# Patient Record
Sex: FEMALE | Race: WHITE | NOT HISPANIC OR LATINO | Employment: OTHER
[De-identification: names, ages, dates, MRNs, and addresses within clinical notes are randomized per-mention and may not be internally consistent; named-entity substitution may affect disease eponyms.]

---

## 2018-06-27 ENCOUNTER — HOSPITAL (OUTPATIENT)
Dept: OTHER | Age: 83
End: 2018-06-27
Attending: INTERNAL MEDICINE

## 2019-04-12 ENCOUNTER — HOSPITAL ENCOUNTER (OUTPATIENT)
Dept: CT IMAGING | Facility: HOSPITAL | Age: 84
Discharge: HOME | End: 2019-04-12
Attending: PHYSICIAN ASSISTANT
Payer: MEDICARE

## 2019-04-12 DIAGNOSIS — E78.5 HYPERLIPIDEMIA, UNSPECIFIED HYPERLIPIDEMIA TYPE: ICD-10-CM

## 2019-04-12 DIAGNOSIS — N18.2 CHRONIC KIDNEY DISEASE, STAGE II (MILD): ICD-10-CM

## 2019-04-12 DIAGNOSIS — R41.3 MEMORY DEFICIT: ICD-10-CM

## 2019-04-12 DIAGNOSIS — R32 URINARY INCONTINENCE: ICD-10-CM

## 2019-04-12 PROCEDURE — 70450 CT HEAD/BRAIN W/O DYE: CPT | Performed by: PHYSICIAN ASSISTANT

## 2019-05-02 ENCOUNTER — OFFICE VISIT (OUTPATIENT)
Dept: NEUROLOGY | Facility: CLINIC | Age: 84
End: 2019-05-02
Payer: MEDICARE

## 2019-05-02 VITALS
HEART RATE: 88 BPM | RESPIRATION RATE: 14 BRPM | BODY MASS INDEX: 32 KG/M2 | SYSTOLIC BLOOD PRESSURE: 116 MMHG | DIASTOLIC BLOOD PRESSURE: 72 MMHG | WEIGHT: 157 LBS

## 2019-05-02 DIAGNOSIS — R45.3 APATHY: ICD-10-CM

## 2019-05-02 DIAGNOSIS — R41.3 POOR SHORT TERM MEMORY: Primary | ICD-10-CM

## 2019-05-02 DIAGNOSIS — F41.9 ANXIETY: ICD-10-CM

## 2019-05-02 DIAGNOSIS — R25.1 TREMOR: ICD-10-CM

## 2019-05-02 DIAGNOSIS — F51.01 PRIMARY INSOMNIA: ICD-10-CM

## 2019-05-02 PROCEDURE — 99205 OFFICE O/P NEW HI 60 MIN: CPT | Performed by: OTHER

## 2019-05-02 RX ORDER — RANITIDINE 150 MG/1
150 TABLET ORAL NIGHTLY
COMMUNITY
Start: 2017-11-14 | End: 2020-01-01

## 2019-05-02 RX ORDER — AMLODIPINE BESYLATE 2.5 MG/1
2.5 TABLET ORAL DAILY
Refills: 1 | Status: ON HOLD | COMMUNITY
Start: 2019-04-05 | End: 2020-01-01

## 2019-05-02 NOTE — PATIENT INSTRUCTIONS
Refill policies:    • Allow 2-3 business days for refills; controlled substances may take longer.   • Contact your pharmacy at least 5 days prior to running out of medication and have them send an electronic request or submit request through the “request re been approved by your insurer. Depending on your insurance carrier, approval may take 3-10 days. It is highly recommended patients contact their insurance carrier directly to determine coverage.   If test is done without insurance authorization, patient ma for the EEG and 2 on your chest for an EKG tracing. Head will be measured using a washable grease pencil. Head will be prepped with a mild abrasive for good conductivity. Electrodes are applied with paste and gauze.   Paste is water soluble so most of it

## 2019-05-02 NOTE — PROGRESS NOTES
[facility]   Neurology; INITIAL CLINIC VISIT  2019, 11:09 AM     Hawa Coombs Patient Status:  No patient class for patient encounter    10/3/1935 MRN [id number]   Location [address] [place] [address] that she does not use drugs.     ALLERGIES:    Pcns [Penicillins]      Runny nose  Sulfa Antibiotics       NAUSEA AND VOMITING  Celecoxib               NAUSEA ONLY    MEDICATIONS:    Current Outpatient Medications:  amLODIPine Besylate 2.5 MG Oral Tab Take palpitations   GI: denies nausea, vomiting, constipation, diarrhea; no rectal bleeding; no heartburn  GENITAL/: no dysuria, urgency or frequency;  no hernias; no nocturia  GYNE/: no dysuria, urgency or frequency; no urinary incontinence  MUSCULOSKELETA Normal hearing       CN IX, XI:  Normal Gag, uvula palate midline       CN XII:  SCM strong, equal shoulder shrug  Motor:  No drift, rapid finger movement symmetric. 5/5 in all limbs with normal tone. No tremor of any kind;   Sensory;   Intact to light touc has rapid declined mental condition and poor concentration, as well as anxiety and hand shakes, I suspected that she has mild AD superimposed with toxic metabolic encephalopathy , tramadol is known to have TME and silent seizure side effect,   Stop tramado

## 2019-05-02 NOTE — PROGRESS NOTES
Family states pt has been independent up until recently. Around January family states pt began to decline cognitively and showing signs of anxiety and disassociation.  Family states pt stopped taking meadications, family member states they have been on top

## 2019-05-08 ENCOUNTER — TELEPHONE (OUTPATIENT)
Dept: NEUROLOGY | Facility: CLINIC | Age: 84
End: 2019-05-08

## 2019-05-08 NOTE — TELEPHONE ENCOUNTER
[facility] calling calling to see if there is a new diagnosis and a summary of the visit

## 2019-05-09 NOTE — TELEPHONE ENCOUNTER
Call transferred from [facility] staff. Xuan Cadena RN case manager from [facility] wanting to discuss summary/plan of care from recent visit. Questions answered.

## 2019-05-20 ENCOUNTER — HOSPITAL ENCOUNTER (OUTPATIENT)
Dept: MRI IMAGING | Facility: HOSPITAL | Age: 84
Discharge: HOME | End: 2019-05-20
Attending: Other
Payer: MEDICARE

## 2019-05-20 ENCOUNTER — PROCEDURE VISIT (OUTPATIENT)
Dept: NEUROLOGY | Facility: CLINIC | Age: 84
End: 2019-05-20

## 2019-05-20 ENCOUNTER — NURSE ONLY (OUTPATIENT)
Dept: ELECTROPHYSIOLOGY | Facility: HOSPITAL | Age: 84
End: 2019-05-20
Attending: Other
Payer: MEDICARE

## 2019-05-20 ENCOUNTER — LAB ENCOUNTER (OUTPATIENT)
Dept: LAB | Age: 84
End: 2019-05-20
Attending: Other
Payer: MEDICARE

## 2019-05-20 DIAGNOSIS — R41.3 POOR SHORT TERM MEMORY: ICD-10-CM

## 2019-05-20 DIAGNOSIS — R41.3 POOR SHORT TERM MEMORY: Primary | ICD-10-CM

## 2019-05-20 PROCEDURE — 84443 ASSAY THYROID STIM HORMONE: CPT

## 2019-05-20 PROCEDURE — 95816 EEG AWAKE AND DROWSY: CPT | Performed by: OTHER

## 2019-05-20 PROCEDURE — 70553 MRI BRAIN STEM W/O & W/DYE: CPT | Performed by: OTHER

## 2019-05-20 PROCEDURE — 86038 ANTINUCLEAR ANTIBODIES: CPT

## 2019-05-20 PROCEDURE — 36415 COLL VENOUS BLD VENIPUNCTURE: CPT

## 2019-05-20 PROCEDURE — 86431 RHEUMATOID FACTOR QUANT: CPT

## 2019-05-20 PROCEDURE — 85025 COMPLETE CBC W/AUTO DIFF WBC: CPT

## 2019-05-20 PROCEDURE — A9575 INJ GADOTERATE MEGLUMI 0.1ML: HCPCS | Performed by: OTHER

## 2019-05-20 PROCEDURE — 86140 C-REACTIVE PROTEIN: CPT

## 2019-05-20 PROCEDURE — 85652 RBC SED RATE AUTOMATED: CPT

## 2019-05-20 PROCEDURE — 82746 ASSAY OF FOLIC ACID SERUM: CPT

## 2019-05-20 PROCEDURE — 80053 COMPREHEN METABOLIC PANEL: CPT

## 2019-05-20 PROCEDURE — 82607 VITAMIN B-12: CPT

## 2019-05-21 NOTE — PROCEDURES
ELECTROENCEPHALOGRAM REPORT      Patient Name: Rodriguez Vance  Chart ID: [id number]  Ordering Physician: Ramila Medrano MD Date of Test: 5/20/2019  Referring Physician:   Patient Diagnosis: Memory loss    HISTORY  PT IS A 84YO FEMALE WHO PRESENTS WITH MA

## 2019-05-23 ENCOUNTER — TELEPHONE (OUTPATIENT)
Dept: NEUROLOGY | Facility: CLINIC | Age: 84
End: 2019-05-23

## 2019-05-23 NOTE — TELEPHONE ENCOUNTER
Spoke with patient's son when calling him for EEG results and he requested MRI results. Routed to covering provider.

## 2019-05-23 NOTE — TELEPHONE ENCOUNTER
MD DOUGLAS Larkin Nurse             Negative EEG but was technically limited. Patient called on approved number and son answered the phone. (ok to speak with son per HIPPA). Son informed of the above information.

## 2019-05-23 NOTE — TELEPHONE ENCOUNTER
MRI brain negative for acute pathology. Shows old chronic strokes. Dr Harinder Valadez can discussed with them on follow up.

## 2019-05-23 NOTE — TELEPHONE ENCOUNTER
Called patient's son and informed him of the following information from Dr. Rodger Purvis;    Germán Turner MD     5/23/19 1:33 PM   Note      MRI brain negative for acute pathology. Shows old chronic strokes.  Dr Dian Quezada can discussed with them on follow

## 2019-05-24 ENCOUNTER — TELEPHONE (OUTPATIENT)
Dept: NEUROLOGY | Facility: CLINIC | Age: 84
End: 2019-05-24

## 2019-05-24 DIAGNOSIS — R41.3 POOR SHORT TERM MEMORY: Primary | ICD-10-CM

## 2019-05-24 NOTE — TELEPHONE ENCOUNTER
Left message for pt to call office for test results and to relay instructions.    ----- Message from Mona Shoemaker MD sent at 5/23/2019  8:25 PM CDT -----  Folic acid is low, take one folic acid 1 mg daily, along with one multiple vitamin

## 2019-05-29 RX ORDER — FOLIC ACID 1 MG/1
1 TABLET ORAL DAILY
Qty: 30 TABLET | Refills: 2 | Status: SHIPPED | OUTPATIENT
Start: 2019-05-29 | End: 2019-06-14

## 2019-05-29 NOTE — TELEPHONE ENCOUNTER
Spoke with pt's son (OK per HIPPA) relayed folic acid results, gave instructions concerning folic acid supplement and daily multivitamin. Understanding was expressed, pt and family member encouraged to call office with any further concerns.  Order for 1mg F

## 2019-06-04 ENCOUNTER — TELEPHONE (OUTPATIENT)
Dept: NEUROLOGY | Facility: CLINIC | Age: 84
End: 2019-06-04

## 2019-06-04 NOTE — TELEPHONE ENCOUNTER
Per Epic review, patient has been informed of the below results.  See TE on 5/23/2019.    ----- Message from Carmita Fowler MD sent at 6/3/2019  2:02 PM CDT -----  MRI showed no acute change, will review film at visit

## 2019-06-10 ENCOUNTER — LAB REQUISITION (OUTPATIENT)
Dept: LAB | Facility: HOSPITAL | Age: 84
End: 2019-06-10
Payer: MEDICARE

## 2019-06-10 DIAGNOSIS — Z87.440 PERSONAL HISTORY OF URINARY INFECTION: ICD-10-CM

## 2019-06-10 DIAGNOSIS — N39.0 URINARY TRACT INFECTION: ICD-10-CM

## 2019-06-10 PROCEDURE — 81001 URINALYSIS AUTO W/SCOPE: CPT | Performed by: FAMILY MEDICINE

## 2019-06-10 PROCEDURE — 87086 URINE CULTURE/COLONY COUNT: CPT | Performed by: FAMILY MEDICINE

## 2019-06-11 ENCOUNTER — TELEPHONE (OUTPATIENT)
Dept: NEUROLOGY | Facility: CLINIC | Age: 84
End: 2019-06-11

## 2019-06-11 NOTE — TELEPHONE ENCOUNTER
Spoke with patient's son Celina Harper (ok per HIPAA consent) who states patient's primary care physician is [facility]. Several attempts made to fax results to [facility] with no confirmation received. Will continue trying.

## 2019-06-14 ENCOUNTER — OFFICE VISIT (OUTPATIENT)
Dept: NEUROLOGY | Facility: CLINIC | Age: 84
End: 2019-06-14
Payer: MEDICARE

## 2019-06-14 VITALS
DIASTOLIC BLOOD PRESSURE: 70 MMHG | WEIGHT: 157 LBS | BODY MASS INDEX: 32 KG/M2 | HEART RATE: 66 BPM | RESPIRATION RATE: 17 BRPM | SYSTOLIC BLOOD PRESSURE: 130 MMHG

## 2019-06-14 DIAGNOSIS — F41.9 ANXIETY: ICD-10-CM

## 2019-06-14 DIAGNOSIS — R25.1 TREMOR: Primary | ICD-10-CM

## 2019-06-14 DIAGNOSIS — G30.1 LATE ONSET ALZHEIMER'S DISEASE WITH BEHAVIORAL DISTURBANCE (HCC): ICD-10-CM

## 2019-06-14 DIAGNOSIS — F51.01 PRIMARY INSOMNIA: ICD-10-CM

## 2019-06-14 DIAGNOSIS — R41.3 POOR SHORT TERM MEMORY: ICD-10-CM

## 2019-06-14 DIAGNOSIS — R45.3 APATHY: ICD-10-CM

## 2019-06-14 DIAGNOSIS — F02.81 LATE ONSET ALZHEIMER'S DISEASE WITH BEHAVIORAL DISTURBANCE (HCC): ICD-10-CM

## 2019-06-14 PROBLEM — I10 BENIGN ESSENTIAL HYPERTENSION: Status: ACTIVE | Noted: 2019-06-14

## 2019-06-14 PROBLEM — M10.9 GOUT: Status: ACTIVE | Noted: 2019-06-14

## 2019-06-14 PROBLEM — N17.9 ACUTE RENAL FAILURE (HCC): Status: ACTIVE | Noted: 2019-06-14

## 2019-06-14 PROBLEM — I12.9 HYPERTENSIVE CHRONIC KIDNEY DISEASE WITH STAGE 1 THROUGH STAGE 4 CHRONIC KIDNEY DISEASE, OR UNSPECIFIED CHRONIC KIDNEY DISEASE: Status: ACTIVE | Noted: 2019-06-14

## 2019-06-14 PROBLEM — H25.13 AGE-RELATED NUCLEAR CATARACT OF BOTH EYES: Status: ACTIVE | Noted: 2017-12-05

## 2019-06-14 PROBLEM — Z96.1 PSEUDOPHAKIA: Status: ACTIVE | Noted: 2017-12-09

## 2019-06-14 PROBLEM — N39.0 URINARY TRACT INFECTION: Status: ACTIVE | Noted: 2019-06-14

## 2019-06-14 PROBLEM — R60.9 EDEMA: Status: ACTIVE | Noted: 2019-06-14

## 2019-06-14 PROBLEM — E55.9 VITAMIN D DEFICIENCY, UNSPECIFIED: Status: ACTIVE | Noted: 2019-06-14

## 2019-06-14 PROCEDURE — 99214 OFFICE O/P EST MOD 30 MIN: CPT | Performed by: OTHER

## 2019-06-14 RX ORDER — OMEGA-3 FATTY ACIDS/FISH OIL 300-1000MG
CAPSULE ORAL
COMMUNITY
End: 2019-08-02

## 2019-06-14 RX ORDER — NYSTATIN 100000 [USP'U]/G
POWDER TOPICAL
Refills: 1 | COMMUNITY
Start: 2019-06-11 | End: 2019-08-02

## 2019-06-14 RX ORDER — MEMANTINE HYDROCHLORIDE 5 MG/1
5 TABLET ORAL 2 TIMES DAILY
Qty: 60 TABLET | Refills: 3 | Status: SHIPPED | OUTPATIENT
Start: 2019-06-14

## 2019-06-14 RX ORDER — FOLIC ACID 1 MG/1
1 TABLET ORAL DAILY
Qty: 30 TABLET | Refills: 11 | Status: SHIPPED | OUTPATIENT
Start: 2019-06-14

## 2019-06-14 NOTE — PROGRESS NOTES
[facility]   Neurology; follow up  CLINIC VISIT  2019    Ismael Rock Patient Status:  No patient class for patient encounter    10/3/1935 MRN [id number]   Location [address] [place] [address], [address] [place] [address], [address] [place] [address] HISTORY:  Past Surgical History:   Procedure Laterality Date   • APPENDECTOMY     • CHOLECYSTECTOMY         FAMILY HISTORY:  family history is not on file. SOCIAL HISTORY:   reports that she has never smoked.  She has never used smokeless tobacco. She re no visual complaints or deficits  HEENT: denies nasal congestion, sinus pain or sore throat; no  hearing loss;  RESPIRATORY: denies shortness of breath, wheezing or cough   CARDIOVASCULAR: denies chest pain, no palpitations   GI: denies nausea, vomiting, c movements normal.  Normal pursuit and saccades.                             No nystagmus, no ptosis       CN V: Masseters strong, Facial sensations normal,        CN  VII:  Symmetric facial movement       CN VIII:  Normal hearing       CN IX, XI:  Normal [place]      Problem List Items Addressed This Visit     Anxiety    Apathy    Late onset Alzheimer's disease with behavioral disturbance    Relevant Medications    Memantine HCl (NAMENDA) 5 MG Oral Tab    Poor short term memory    Relevant Medications

## 2019-06-17 ENCOUNTER — TELEPHONE (OUTPATIENT)
Dept: NEUROLOGY | Facility: CLINIC | Age: 84
End: 2019-06-17

## 2019-06-19 ENCOUNTER — TELEPHONE (OUTPATIENT)
Dept: NEUROLOGY | Facility: CLINIC | Age: 84
End: 2019-06-19

## 2019-06-19 NOTE — TELEPHONE ENCOUNTER
Spoke with Purnima Ham [facility] and confirmed Dx of Alzheimer's and Dx codes at [facility] [place]. Michelle Valdez also said patient's son tried to  her Namenda but pharmacy did not have the Rx. Pharmacy called and has Rx. Pharmacist will notify patient ready for .

## 2019-08-02 ENCOUNTER — APPOINTMENT (OUTPATIENT)
Dept: GENERAL RADIOLOGY | Facility: HOSPITAL | Age: 84
DRG: 071 | End: 2019-08-02
Attending: EMERGENCY MEDICINE
Payer: MEDICARE

## 2019-08-02 ENCOUNTER — HOSPITAL ENCOUNTER (INPATIENT)
Facility: HOSPITAL | Age: 84
LOS: 2 days | Discharge: NURSING, REHAB OR LONG-TERM CARE | DRG: 071 | End: 2019-08-06
Attending: EMERGENCY MEDICINE | Admitting: INTERNAL MEDICINE
Payer: MEDICARE

## 2019-08-02 ENCOUNTER — APPOINTMENT (OUTPATIENT)
Dept: CT IMAGING | Facility: HOSPITAL | Age: 84
DRG: 071 | End: 2019-08-02
Attending: EMERGENCY MEDICINE
Payer: MEDICARE

## 2019-08-02 DIAGNOSIS — E86.0 DEHYDRATION: ICD-10-CM

## 2019-08-02 DIAGNOSIS — R41.82 ALTERED MENTAL STATUS, UNSPECIFIED ALTERED MENTAL STATUS TYPE: Primary | ICD-10-CM

## 2019-08-02 PROBLEM — D64.9 ANEMIA: Status: ACTIVE | Noted: 2019-08-02

## 2019-08-02 PROBLEM — D69.6 THROMBOCYTOPENIA (HCC): Status: ACTIVE | Noted: 2019-08-02

## 2019-08-02 PROBLEM — R79.89 AZOTEMIA: Status: ACTIVE | Noted: 2019-08-02

## 2019-08-02 LAB
ALBUMIN SERPL-MCNC: 3 G/DL (ref 3.4–5)
ALBUMIN/GLOB SERPL: 0.8 {RATIO} (ref 1–2)
ALP LIVER SERPL-CCNC: 104 U/L (ref 55–142)
ALT SERPL-CCNC: 12 U/L (ref 13–56)
ANION GAP SERPL CALC-SCNC: 5 MMOL/L (ref 0–18)
AST SERPL-CCNC: 7 U/L (ref 15–37)
ATRIAL RATE: 57 BPM
BASOPHILS # BLD AUTO: 0.01 X10(3) UL (ref 0–0.2)
BASOPHILS NFR BLD AUTO: 0.2 %
BILIRUB SERPL-MCNC: 0.4 MG/DL (ref 0.1–2)
BILIRUB UR QL STRIP.AUTO: NEGATIVE
BUN BLD-MCNC: 36 MG/DL (ref 7–18)
BUN/CREAT SERPL: 22.8 (ref 10–20)
CALCIUM BLD-MCNC: 9.3 MG/DL (ref 8.5–10.1)
CHLORIDE SERPL-SCNC: 113 MMOL/L (ref 98–112)
CLARITY UR REFRACT.AUTO: CLEAR
CO2 SERPL-SCNC: 25 MMOL/L (ref 21–32)
COLOR UR AUTO: YELLOW
CREAT BLD-MCNC: 1.58 MG/DL (ref 0.55–1.02)
DEPRECATED RDW RBC AUTO: 51.8 FL (ref 35.1–46.3)
EOSINOPHIL # BLD AUTO: 0.14 X10(3) UL (ref 0–0.7)
EOSINOPHIL NFR BLD AUTO: 2.4 %
ERYTHROCYTE [DISTWIDTH] IN BLOOD BY AUTOMATED COUNT: 15.7 % (ref 11–15)
GLOBULIN PLAS-MCNC: 3.7 G/DL (ref 2.8–4.4)
GLUCOSE BLD-MCNC: 94 MG/DL (ref 70–99)
GLUCOSE UR STRIP.AUTO-MCNC: NEGATIVE MG/DL
HCT VFR BLD AUTO: 36.2 % (ref 35–48)
HGB BLD-MCNC: 11.3 G/DL (ref 12–16)
IMM GRANULOCYTES # BLD AUTO: 0.02 X10(3) UL (ref 0–1)
IMM GRANULOCYTES NFR BLD: 0.3 %
KETONES UR STRIP.AUTO-MCNC: NEGATIVE MG/DL
LEUKOCYTE ESTERASE UR QL STRIP.AUTO: NEGATIVE
LYMPHOCYTES # BLD AUTO: 1.69 X10(3) UL (ref 1–4)
LYMPHOCYTES NFR BLD AUTO: 28.9 %
M PROTEIN MFR SERPL ELPH: 6.7 G/DL (ref 6.4–8.2)
MCH RBC QN AUTO: 28 PG (ref 26–34)
MCHC RBC AUTO-ENTMCNC: 31.2 G/DL (ref 31–37)
MCV RBC AUTO: 89.6 FL (ref 80–100)
MONOCYTES # BLD AUTO: 0.49 X10(3) UL (ref 0.1–1)
MONOCYTES NFR BLD AUTO: 8.4 %
NEUTROPHILS # BLD AUTO: 3.5 X10 (3) UL (ref 1.5–7.7)
NEUTROPHILS # BLD AUTO: 3.5 X10(3) UL (ref 1.5–7.7)
NEUTROPHILS NFR BLD AUTO: 59.8 %
NITRITE UR QL STRIP.AUTO: NEGATIVE
OSMOLALITY SERPL CALC.SUM OF ELEC: 304 MOSM/KG (ref 275–295)
P AXIS: 17 DEGREES
P-R INTERVAL: 164 MS
PH UR STRIP.AUTO: 6 [PH] (ref 4.5–8)
PLATELET # BLD AUTO: 137 10(3)UL (ref 150–450)
POTASSIUM SERPL-SCNC: 4.5 MMOL/L (ref 3.5–5.1)
PROT UR STRIP.AUTO-MCNC: NEGATIVE MG/DL
Q-T INTERVAL: 458 MS
QRS DURATION: 148 MS
QTC CALCULATION (BEZET): 445 MS
R AXIS: -50 DEGREES
RBC # BLD AUTO: 4.4 X10(6)UL (ref 3.8–5.3)
RBC UR QL AUTO: NEGATIVE
SODIUM SERPL-SCNC: 143 MMOL/L (ref 136–145)
SP GR UR STRIP.AUTO: 1.01 (ref 1–1.03)
T AXIS: 8 DEGREES
TROPONIN I SERPL-MCNC: <0.045 NG/ML (ref ?–0.04)
UROBILINOGEN UR STRIP.AUTO-MCNC: <2 MG/DL
VENTRICULAR RATE: 57 BPM
WBC # BLD AUTO: 5.9 X10(3) UL (ref 4–11)

## 2019-08-02 PROCEDURE — 72125 CT NECK SPINE W/O DYE: CPT | Performed by: EMERGENCY MEDICINE

## 2019-08-02 PROCEDURE — 70450 CT HEAD/BRAIN W/O DYE: CPT | Performed by: EMERGENCY MEDICINE

## 2019-08-02 PROCEDURE — 71045 X-RAY EXAM CHEST 1 VIEW: CPT | Performed by: EMERGENCY MEDICINE

## 2019-08-02 RX ORDER — ASPIRIN 81 MG/1
81 TABLET, CHEWABLE ORAL DAILY
COMMUNITY
End: 2020-01-01 | Stop reason: CLARIF

## 2019-08-02 RX ORDER — ACETAMINOPHEN 500 MG
500 TABLET ORAL NIGHTLY
COMMUNITY

## 2019-08-02 RX ORDER — SODIUM CHLORIDE 9 MG/ML
125 INJECTION, SOLUTION INTRAVENOUS CONTINUOUS
Status: DISCONTINUED | OUTPATIENT
Start: 2019-08-02 | End: 2019-08-04

## 2019-08-02 RX ORDER — ONDANSETRON 2 MG/ML
4 INJECTION INTRAMUSCULAR; INTRAVENOUS EVERY 4 HOURS PRN
Status: DISCONTINUED | OUTPATIENT
Start: 2019-08-02 | End: 2019-08-06

## 2019-08-02 RX ORDER — SODIUM CHLORIDE 9 MG/ML
INJECTION, SOLUTION INTRAVENOUS CONTINUOUS
Status: DISCONTINUED | OUTPATIENT
Start: 2019-08-02 | End: 2019-08-06

## 2019-08-02 RX ORDER — SODIUM CHLORIDE 9 MG/ML
INJECTION, SOLUTION INTRAVENOUS CONTINUOUS
Status: ACTIVE | OUTPATIENT
Start: 2019-08-02 | End: 2019-08-02

## 2019-08-02 RX ORDER — HEPARIN SODIUM 5000 [USP'U]/ML
5000 INJECTION, SOLUTION INTRAVENOUS; SUBCUTANEOUS EVERY 12 HOURS SCHEDULED
Status: DISCONTINUED | OUTPATIENT
Start: 2019-08-02 | End: 2019-08-06

## 2019-08-02 RX ORDER — FOAM BANDAGE 4" X 4"
BANDAGE TOPICAL
Status: ON HOLD | COMMUNITY
End: 2020-01-01

## 2019-08-02 NOTE — ED NOTES
Spoke with patient's son Zbigniew bauman notify of patient status and plan for admission. Informed of recent dementia diagnosis by Dr. Eduardo Rossi as well as placement in longterm care facility.

## 2019-08-02 NOTE — ED NOTES
Patient ready for admission, observed sleeping intermittently. Responds to her name with light touch, still very drowsy and aox1 when awake. VSS. No distress observed.

## 2019-08-02 NOTE — ED NOTES
Patient becoming more alert, opening her eyes and trying to answer questions. Remains somewhat restless. VS remain stable. Patient has returned from [address] Xray has been at bedside.

## 2019-08-02 NOTE — ED PROVIDER NOTES
Patient Seen in: [facility] Emergency Department    History   Patient presents with:  Altered Mental Status (neurologic)    Stated Complaint: AMS    HPI    This is a 28-year-old female who arrives from the nursing home.   According to the paramedics 57   Resp 08/02/19 0921 14   Temp 08/02/19 0921 98.2 °F (36.8 °C)   Temp src 08/02/19 0921 Temporal   SpO2 08/02/19 0921 99 %   O2 Device 08/02/19 0915 None (Room air)       Current:BP (!) 161/73   Pulse 51   Temp 98.2 °F (36.8 °C) (Temporal)   Resp 14   H HGB 11.3 (*)     .0 (*)     RDW 15.7 (*)     RDW-SD 51.8 (*)     All other components within normal limits   TROPONIN I - Normal   CBC WITH DIFFERENTIAL WITH PLATELET    Narrative:      The following orders were created for panel order CBC WITH DI prominence of the ventricles. There are patchy areas of low attenuation in the periventricular and deep white matter which are nonspecific but most consistent with small vessel ischemic changes.  There is no evidence of hemorrhage, mass, midline shift, or e Johana Richardson the patient had an episode where she was on altered mental status, unresponsive while she was at the dining room table there is no acute pathology at this present time possibly include arrhythmias, syncope, altered mental status.   At this present anibal

## 2019-08-02 NOTE — ED NOTES
Patient continues to become more alert and trying to answer questions. Spoke with Jones DAWKINS at [facility]. Informed that patient is normal aox2-3 with recent diagnosis of dementia, usually walks with walker with assistance.  Typically a quiet person

## 2019-08-02 NOTE — ED INITIAL ASSESSMENT (HPI)
Patient arrives by ems from [facility] following call for \"unconscious person at dining table\". Ems states they found patient lying in bed, unable to get any further report on patient other than a few minutes of being unresponsive this morning.

## 2019-08-02 NOTE — PLAN OF CARE
Patient admitted to [facility]  Admission Navigator completed  Dr Svetlana Hester paged and admission orders obtained  Patient is resting comfortably   Will continue to monitor

## 2019-08-03 LAB
CORTIS SERPL-MCNC: 14.2 UG/DL
T4 FREE SERPL-MCNC: 0.9 NG/DL (ref 0.8–1.7)
TSI SER-ACNC: 1.52 MIU/ML (ref 0.36–3.74)
VIT B12 SERPL-MCNC: 917 PG/ML (ref 193–986)

## 2019-08-03 PROCEDURE — 99223 1ST HOSP IP/OBS HIGH 75: CPT | Performed by: OTHER

## 2019-08-03 RX ORDER — METHENAMINE HIPPURATE 1000 MG/1
1 TABLET ORAL 2 TIMES DAILY
Status: DISCONTINUED | OUTPATIENT
Start: 2019-08-03 | End: 2019-08-06

## 2019-08-03 RX ORDER — ACETAMINOPHEN 500 MG
500 TABLET ORAL EVERY 8 HOURS PRN
Status: DISCONTINUED | OUTPATIENT
Start: 2019-08-03 | End: 2019-08-06

## 2019-08-03 RX ORDER — FOLIC ACID 1 MG/1
1 TABLET ORAL DAILY
Status: DISCONTINUED | OUTPATIENT
Start: 2019-08-03 | End: 2019-08-06

## 2019-08-03 RX ORDER — ATORVASTATIN CALCIUM 10 MG/1
10 TABLET, FILM COATED ORAL NIGHTLY
Status: DISCONTINUED | OUTPATIENT
Start: 2019-08-03 | End: 2019-08-06

## 2019-08-03 RX ORDER — CALCIUM CARBONATE/VITAMIN D3 250-3.125
1 TABLET ORAL DAILY
Status: DISCONTINUED | OUTPATIENT
Start: 2019-08-03 | End: 2019-08-06

## 2019-08-03 RX ORDER — MEMANTINE HYDROCHLORIDE 10 MG/1
5 TABLET ORAL 2 TIMES DAILY
Status: DISCONTINUED | OUTPATIENT
Start: 2019-08-03 | End: 2019-08-06

## 2019-08-03 RX ORDER — ASPIRIN 81 MG/1
81 TABLET, CHEWABLE ORAL DAILY
Status: DISCONTINUED | OUTPATIENT
Start: 2019-08-03 | End: 2019-08-06

## 2019-08-03 RX ORDER — AMLODIPINE BESYLATE 2.5 MG/1
2.5 TABLET ORAL DAILY
Status: DISCONTINUED | OUTPATIENT
Start: 2019-08-03 | End: 2019-08-06

## 2019-08-03 RX ORDER — ACETAMINOPHEN 325 MG/1
650 TABLET ORAL EVERY 6 HOURS PRN
Status: DISCONTINUED | OUTPATIENT
Start: 2019-08-03 | End: 2019-08-06

## 2019-08-03 RX ORDER — FAMOTIDINE 20 MG/1
20 TABLET ORAL DAILY
Status: DISCONTINUED | OUTPATIENT
Start: 2019-08-03 | End: 2019-08-06

## 2019-08-03 RX ORDER — ACETAMINOPHEN 500 MG
500 TABLET ORAL NIGHTLY
Status: DISCONTINUED | OUTPATIENT
Start: 2019-08-03 | End: 2019-08-06

## 2019-08-03 NOTE — PLAN OF CARE
Pt alert to self. Denies pain. VSS. Afebrile. IVF. Mepilex, medihoney to R heel. Bunny boots in place. Neuro consulted. PT/OT eval.  Plan for EEG, MRI, MRA. Will monitor closely.       Problem: NEUROLOGICAL - ADULT  Goal: Achieves stable or improved assistive devices as appropriate  - Consider OT/PT consult to assist with strengthening/mobility  - Encourage toileting schedule  Outcome: Progressing

## 2019-08-03 NOTE — CONSULTS
[facility]   Neurology; INITIAL Consultation VISIT  8/3/2019, 11:01 AM     Rodriguez Vance Patient Status:  Observation    10/3/1935 MRN [id number]   [facility] 3NE-A Attending MD ARACELIS Ardon Runny nose  Sulfa Antibiotics       NAUSEA AND VOMITING  Trimethoprim            UNKNOWN  Celecoxib               NAUSEA ONLY    MEDICATIONS:    No current facility-administered medications on file prior to encounter.    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 Need to rule out acute event. Differential diagnosis would include stroke or seizures. Continue searching for any toxic metabolic factors.   I will go ahead an

## 2019-08-03 NOTE — PLAN OF CARE
Assumed patient care at 1900  Patient A&O to self  No complaints of pain or discomfort  CPOT scale used  VSS  Tele-SR/SB  Patient mainly non-verbal overnight  Heparin Sub Q  No new orders from MD  (L) Heel ulcer dressed. Dressing C/D/I.  Bunny boots in plac integrity remains intact  Description  INTERVENTIONS  - Assess and document risk factors for pressure ulcer development  - Assess and document skin integrity  - Monitor for areas of redness and/or skin breakdown  - Initiate interventions, skin care algorit

## 2019-08-03 NOTE — H&P
[facility]    PATIENT'S NAME: Ines Esquivel   ATTENDING PHYSICIAN: Sara Vasquez M.D.    PATIENT ACCOUNT#:   [de-identified]    LOCATION:  [address] [place] [address]  MEDICAL RECORD #:   [id number]       YOB: 1935  ADMISSION DATE:       08/02/2019 EXAMINATION:    GENERAL:  Fair, awake, alert to name. HEENT:  Head:  Atraumatic. Eyes:  EOMI. NECK:  Supple. No JVD. LUNGS:  Clear to auscultate. No rhonchi, wheezes. HEART:  Regular rate and rhythm. No S3.  ABDOMEN:  Bowel sounds present.   Soft

## 2019-08-04 ENCOUNTER — APPOINTMENT (OUTPATIENT)
Dept: MRI IMAGING | Facility: HOSPITAL | Age: 84
DRG: 071 | End: 2019-08-04
Attending: Other
Payer: MEDICARE

## 2019-08-04 LAB
ALBUMIN SERPL-MCNC: 2.6 G/DL (ref 3.4–5)
ALBUMIN/GLOB SERPL: 0.8 {RATIO} (ref 1–2)
ALP LIVER SERPL-CCNC: 96 U/L (ref 55–142)
ALT SERPL-CCNC: 12 U/L (ref 13–56)
ANION GAP SERPL CALC-SCNC: 4 MMOL/L (ref 0–18)
AST SERPL-CCNC: 10 U/L (ref 15–37)
BASOPHILS # BLD AUTO: 0.01 X10(3) UL (ref 0–0.2)
BASOPHILS NFR BLD AUTO: 0.2 %
BILIRUB SERPL-MCNC: 0.4 MG/DL (ref 0.1–2)
BUN BLD-MCNC: 22 MG/DL (ref 7–18)
BUN/CREAT SERPL: 16.8 (ref 10–20)
CALCIUM BLD-MCNC: 8.4 MG/DL (ref 8.5–10.1)
CHLORIDE SERPL-SCNC: 116 MMOL/L (ref 98–112)
CO2 SERPL-SCNC: 23 MMOL/L (ref 21–32)
CREAT BLD-MCNC: 1.31 MG/DL (ref 0.55–1.02)
DEPRECATED RDW RBC AUTO: 50.4 FL (ref 35.1–46.3)
EOSINOPHIL # BLD AUTO: 0.09 X10(3) UL (ref 0–0.7)
EOSINOPHIL NFR BLD AUTO: 2 %
ERYTHROCYTE [DISTWIDTH] IN BLOOD BY AUTOMATED COUNT: 15.3 % (ref 11–15)
GLOBULIN PLAS-MCNC: 3.3 G/DL (ref 2.8–4.4)
GLUCOSE BLD-MCNC: 83 MG/DL (ref 70–99)
HCT VFR BLD AUTO: 34.1 % (ref 35–48)
HGB BLD-MCNC: 10.5 G/DL (ref 12–16)
IMM GRANULOCYTES # BLD AUTO: 0.01 X10(3) UL (ref 0–1)
IMM GRANULOCYTES NFR BLD: 0.2 %
LYMPHOCYTES # BLD AUTO: 1.25 X10(3) UL (ref 1–4)
LYMPHOCYTES NFR BLD AUTO: 28.2 %
M PROTEIN MFR SERPL ELPH: 5.9 G/DL (ref 6.4–8.2)
MCH RBC QN AUTO: 28 PG (ref 26–34)
MCHC RBC AUTO-ENTMCNC: 30.8 G/DL (ref 31–37)
MCV RBC AUTO: 90.9 FL (ref 80–100)
MONOCYTES # BLD AUTO: 0.47 X10(3) UL (ref 0.1–1)
MONOCYTES NFR BLD AUTO: 10.6 %
NEUTROPHILS # BLD AUTO: 2.6 X10 (3) UL (ref 1.5–7.7)
NEUTROPHILS # BLD AUTO: 2.6 X10(3) UL (ref 1.5–7.7)
NEUTROPHILS NFR BLD AUTO: 58.8 %
OSMOLALITY SERPL CALC.SUM OF ELEC: 298 MOSM/KG (ref 275–295)
PLATELET # BLD AUTO: 103 10(3)UL (ref 150–450)
POTASSIUM SERPL-SCNC: 4.2 MMOL/L (ref 3.5–5.1)
RBC # BLD AUTO: 3.75 X10(6)UL (ref 3.8–5.3)
SODIUM SERPL-SCNC: 143 MMOL/L (ref 136–145)
WBC # BLD AUTO: 4.4 X10(3) UL (ref 4–11)

## 2019-08-04 PROCEDURE — 70551 MRI BRAIN STEM W/O DYE: CPT | Performed by: OTHER

## 2019-08-04 PROCEDURE — 99233 SBSQ HOSP IP/OBS HIGH 50: CPT | Performed by: OTHER

## 2019-08-04 PROCEDURE — 95816 EEG AWAKE AND DROWSY: CPT | Performed by: OTHER

## 2019-08-04 RX ORDER — DEXTROSE, SODIUM CHLORIDE, AND POTASSIUM CHLORIDE 5; .45; .075 G/100ML; G/100ML; G/100ML
INJECTION INTRAVENOUS CONTINUOUS
Status: DISCONTINUED | OUTPATIENT
Start: 2019-08-04 | End: 2019-08-06

## 2019-08-04 RX ORDER — LORAZEPAM 2 MG/ML
1 INJECTION INTRAMUSCULAR ONCE
Status: COMPLETED | OUTPATIENT
Start: 2019-08-05 | End: 2019-08-05

## 2019-08-04 RX ORDER — HYDRALAZINE HYDROCHLORIDE 20 MG/ML
10 INJECTION INTRAMUSCULAR; INTRAVENOUS EVERY 6 HOURS PRN
Status: DISCONTINUED | OUTPATIENT
Start: 2019-08-04 | End: 2019-08-06

## 2019-08-04 NOTE — PHYSICAL THERAPY NOTE
PT eval orders received, chart reviewed. Pt with MRI pending and bedrest order with bathroom privileges only. . Will await MRI and upgrade in activity orders. Will check back as schedule allows. RN in agreement.

## 2019-08-04 NOTE — PROGRESS NOTES
[facility]  Neurology  Notes  Affiliated with [facility]  2019, 11:37 AM     Radha Parrish Patient Status:  Observation    10/3/1935 MRN [id number]   [facility] 3NE-A Attending Mike Sheets MD   H aspirin  81 mg Oral Daily   • Calcium Carbonate-Vitamin D  1 tablet Oral Daily   • Heparin Sodium (Porcine)  5,000 Units Subcutaneous 2 times per day       PRN Meds:  acetaminophen, acetaminophen, ondansetron HCl    Continuous Infusion Meds  • sodium chlor Bilirubin, Total 0.4 0.1 - 2.0 mg/dL    Total Protein 5.9 (L) 6.4 - 8.2 g/dL    Albumin 2.6 (L) 3.4 - 5.0 g/dL    Globulin  3.3 2.8 - 4.4 g/dL    A/G Ratio 0.8 (L) 1.0 - 2.0   CBC W/ DIFFERENTIAL    Collection Time: 08/04/19  6:36 AM   Result Value Ref Ran complex diagnosis    (   ) ICU >35 minutes total time   Available within moments call in hospital  PROCEDURE DONE    (   ) see notes

## 2019-08-04 NOTE — PLAN OF CARE
Assumed care at [facility]. Pt A&O to self. Delayed responses. On RA.   NSR SB on tele. Briefed and incontinent. Denies pain. NS infusing at 83 ml/hr. Pureed diet and aspiration precautions. Pills crushed with apple sauce. Neuro on consult.    B

## 2019-08-04 NOTE — SLP NOTE
ADULT SWALLOWING EVALUATION    ASSESSMENT    ASSESSMENT/OVERALL IMPRESSION:  Bedside swallow evaluation completed d/t pt admitting from nursing home on modified diet of puree solids and thin liquids.  Per chart, pt admitted with AMS, and history is signific Anxiety    • Dementia ([facility] [address]    • Depression    • Esophageal reflux    • Gait abnormality    • GERD    • High blood pressure    • High cholesterol    • Hyperlipidemia    • HYPERTENSION    • Kidney disease, chronic, stage III (GFR 30-59 ml/min) ([facility])    • UT If you have any questions, please contact Darrell Linn

## 2019-08-04 NOTE — PROGRESS NOTES
Patient having difficulty swallowing pureed solids this AM.  Speech consulted. PO medications held for further recommendations.

## 2019-08-04 NOTE — PROGRESS NOTES
[facility]  Progress Note    Dugan Beatriz Patient Status:  Observation    10/3/1935 MRN [id number]   [facility] 3NE-A Attending Alize Kidd MD   Hosp Day # 0 PCP Derrick Ortiz MD         SUBJECTIVE:  Subjective:  Ritchie Henderson is Methenamine Hippurate  1 g Oral BID   • atorvastatin  10 mg Oral Nightly   • amLODIPine Besylate  2.5 mg Oral Daily   • famoTIDine  20 mg Oral Daily   • acetaminophen  500 mg Oral Nightly   • Memantine HCl  5 mg Oral BID   • Sertraline HCl  50 mg Oral Sung ordered,  Available and radiology reviewed  All consultant notes reviewed  Discussed with nursing on floor  dvt prophylaxis reviewed  PT and/or OT  Elicia Weber MD

## 2019-08-04 NOTE — PLAN OF CARE
Patient is Alert to person only (dementia at baseline). Calm, cooperative, follows commands. NSR on telemetry (SB with HR in 50's while asleep), VSS, afebrile. On RA, saturating 100%. Denies pain.    NPO per speech recommendation for difficulty swallow

## 2019-08-04 NOTE — OCCUPATIONAL THERAPY NOTE
Received OT orders for eval and txt. Chart reviewed and therapy spoke with RN. Pt with MRI pending and bedrest order with bathroom privileges only. . Will await MRI and upgrade in activity orders. Will check back as schedule allows. RN in agreement.

## 2019-08-05 ENCOUNTER — APPOINTMENT (OUTPATIENT)
Dept: MRI IMAGING | Facility: HOSPITAL | Age: 84
DRG: 071 | End: 2019-08-05
Attending: Other
Payer: MEDICARE

## 2019-08-05 ENCOUNTER — APPOINTMENT (OUTPATIENT)
Dept: ULTRASOUND IMAGING | Facility: HOSPITAL | Age: 84
DRG: 071 | End: 2019-08-05
Attending: INTERNAL MEDICINE
Payer: MEDICARE

## 2019-08-05 ENCOUNTER — APPOINTMENT (OUTPATIENT)
Dept: MRI IMAGING | Facility: HOSPITAL | Age: 84
DRG: 071 | End: 2019-08-05
Attending: NURSE PRACTITIONER
Payer: MEDICARE

## 2019-08-05 PROBLEM — Z71.89 COUNSELING REGARDING ADVANCE CARE PLANNING AND GOALS OF CARE: Status: ACTIVE | Noted: 2019-08-05

## 2019-08-05 LAB
ALBUMIN SERPL-MCNC: 2.8 G/DL (ref 3.4–5)
ALBUMIN/GLOB SERPL: 0.8 {RATIO} (ref 1–2)
ALP LIVER SERPL-CCNC: 107 U/L (ref 55–142)
ALT SERPL-CCNC: 11 U/L (ref 13–56)
ANION GAP SERPL CALC-SCNC: 7 MMOL/L (ref 0–18)
AST SERPL-CCNC: 11 U/L (ref 15–37)
BASOPHILS # BLD AUTO: 0.01 X10(3) UL (ref 0–0.2)
BASOPHILS NFR BLD AUTO: 0.2 %
BILIRUB SERPL-MCNC: 0.5 MG/DL (ref 0.1–2)
BUN BLD-MCNC: 15 MG/DL (ref 7–18)
BUN/CREAT SERPL: 11.9 (ref 10–20)
CALCIUM BLD-MCNC: 8.7 MG/DL (ref 8.5–10.1)
CHLORIDE SERPL-SCNC: 113 MMOL/L (ref 98–112)
CO2 SERPL-SCNC: 21 MMOL/L (ref 21–32)
CREAT BLD-MCNC: 1.26 MG/DL (ref 0.55–1.02)
DEPRECATED RDW RBC AUTO: 48.6 FL (ref 35.1–46.3)
EOSINOPHIL # BLD AUTO: 0.1 X10(3) UL (ref 0–0.7)
EOSINOPHIL NFR BLD AUTO: 2.1 %
ERYTHROCYTE [DISTWIDTH] IN BLOOD BY AUTOMATED COUNT: 15.1 % (ref 11–15)
GLOBULIN PLAS-MCNC: 3.6 G/DL (ref 2.8–4.4)
GLUCOSE BLD-MCNC: 98 MG/DL (ref 70–99)
HCT VFR BLD AUTO: 36.4 % (ref 35–48)
HGB BLD-MCNC: 11.4 G/DL (ref 12–16)
IMM GRANULOCYTES # BLD AUTO: 0.02 X10(3) UL (ref 0–1)
IMM GRANULOCYTES NFR BLD: 0.4 %
LYMPHOCYTES # BLD AUTO: 1.21 X10(3) UL (ref 1–4)
LYMPHOCYTES NFR BLD AUTO: 25.5 %
M PROTEIN MFR SERPL ELPH: 6.4 G/DL (ref 6.4–8.2)
MCH RBC QN AUTO: 27.7 PG (ref 26–34)
MCHC RBC AUTO-ENTMCNC: 31.3 G/DL (ref 31–37)
MCV RBC AUTO: 88.6 FL (ref 80–100)
MONOCYTES # BLD AUTO: 0.41 X10(3) UL (ref 0.1–1)
MONOCYTES NFR BLD AUTO: 8.6 %
NEUTROPHILS # BLD AUTO: 3 X10 (3) UL (ref 1.5–7.7)
NEUTROPHILS # BLD AUTO: 3 X10(3) UL (ref 1.5–7.7)
NEUTROPHILS NFR BLD AUTO: 63.2 %
OSMOLALITY SERPL CALC.SUM OF ELEC: 293 MOSM/KG (ref 275–295)
PLATELET # BLD AUTO: 104 10(3)UL (ref 150–450)
POTASSIUM SERPL-SCNC: 4.2 MMOL/L (ref 3.5–5.1)
RBC # BLD AUTO: 4.11 X10(6)UL (ref 3.8–5.3)
SODIUM SERPL-SCNC: 141 MMOL/L (ref 136–145)
WBC # BLD AUTO: 4.8 X10(3) UL (ref 4–11)

## 2019-08-05 PROCEDURE — 70549 MR ANGIOGRAPH NECK W/O&W/DYE: CPT | Performed by: OTHER

## 2019-08-05 PROCEDURE — 70551 MRI BRAIN STEM W/O DYE: CPT | Performed by: NURSE PRACTITIONER

## 2019-08-05 PROCEDURE — 99222 1ST HOSP IP/OBS MODERATE 55: CPT | Performed by: INTERNAL MEDICINE

## 2019-08-05 PROCEDURE — 93970 EXTREMITY STUDY: CPT | Performed by: INTERNAL MEDICINE

## 2019-08-05 NOTE — PROGRESS NOTES
[address] [facility] Neurology Progress Note    Dudley Castilloluis Patient Status:  Observation    10/3/1935 MRN [id number]   [facility] 3NE-A Attending Courtney Hidalgo MD   Hosp Day # 0 PCP Elicia Weber MD     CC:  JULIANNA    Subjective:  Donna Dumont Diagnostics:  8/5/2019 MRA Carotids- pending    8/4/2019 MRI Brain  Patient was unable to tolerate the exam except for the DW sequence; which was unremarkable. 8/4/2019 EEG  Normal awake and drowsy EEG.       Assessment/Plan:  · Encephalopathy- que mentation, please page neurology. Will sign off, please call with any questions.        Tamanna Moses, DO  Neuromuscular and General Neurology  [facility] [employer]  pager [phone]

## 2019-08-05 NOTE — OCCUPATIONAL THERAPY NOTE
Received order for OT evaluation. MRA Carotid testing pending. Bathroom privilege only until the test is complete. Will wait for activity level upgrade. Will continue to follow. Spoke with RN.

## 2019-08-05 NOTE — PHYSICAL THERAPY NOTE
Received order for PT evaluation. MRA Carotid testing pending. Bathroom privilege only until the test is complete. Will wait for activity level upgrade. Will continue to follow. Spoke with RN.

## 2019-08-05 NOTE — SLP NOTE
SPEECH DAILY NOTE - INPATIENT    ASSESSMENT & PLAN   ASSESSMENT  Pt seen this AM for reassessment of swallow at bedside. Pt's son present during evaluation- RN approved session. Pt cooperative, pleasant, and alert.  Pt demonstrated improved alertness per RN

## 2019-08-05 NOTE — PROCEDURES
[facility] [place]  in affiliation with [facility]  [address]  [place], [address] [place] [address]  [phone]  Fax [phone]      Name: Marilyn Mcghee  10/3/1935  Date of Study used for a routine EEG that was performed in the awake and drowsy state without sedation. Digital recording was utilized    Background Activity and Sleep:   The background activity demonstrated frequency of 9-10hz well organized small to medium voltage al

## 2019-08-05 NOTE — PLAN OF CARE
Assumed care at [address]. Pt is A&O x to self. Hx of dementia. Delayed responses. On RA.   NSR on tele. Briefed and incontinent. Denies pain. D5 45 10 meq kCL infusing at 75 ml/hr. NPO due to speech eval tomorrow.    Palliative care consulted an

## 2019-08-05 NOTE — PLAN OF CARE
Problem: Impaired Swallowing  Goal: Minimize aspiration risk  Description  Interventions:  Pureed and thin liquids   Outcome: Progressing

## 2019-08-05 NOTE — PROGRESS NOTES
[facility]  Progress Note    Rodriguez Vance Patient Status:  Observation    10/3/1935 MRN [id number]   [facility] 3NE-A Attending Negro Sims MD   Hosp Day # 0 PCP Blanche Marques MD         SUBJECTIVE:  Subjective:  Rodriguez Vance is * 116* 113*   CO2 25.0 23.0 21.0   BUN 36* 22* 15   CREATSERUM 1.58* 1.31* 1.26*   CA 9.3 8.4* 8.7   GLU 94 83 98       Recent Labs   Lab 08/02/19  0914 08/04/19  0636 08/05/19  0648   ALT 12* 12* 11*   AST 7* 10* 11*   ALB 3.0* 2.6* 2.8*       No heparin. 8.       Gastrointestinal prophylaxis. On famotidine. 9.       Dyslipidemia. On statin. 10.     Weakness. PT/OT evaluate and treat. 11.     Osteoarthritis. On Tylenol. 12.     Disposition:  Per .     13.     Acute ki

## 2019-08-05 NOTE — PLAN OF CARE
Pt alert to self. Denies pain. VSS. Afebrile. RA.  IVF. SLP eval - pureed consistency, thin liquids. Meds crushed in applesauce. Palliative consult for goals of care. DNR. Bunny boots in place. Aspiration precautions.   PT/OT eval.  Plan for MRI, M Encourage toileting schedule  Outcome: Progressing     Problem: Impaired Swallowing  Goal: Minimize aspiration risk  Description  Interventions:  NPO. SLP to re-assess.    Outcome: Progressing

## 2019-08-05 NOTE — CONSULTS
[address]  [id number]  Hospital Day #0  Date of Consult: 08/05/19  Patient seen at: [facility] Room 3606    Reason for Consultation:      Consult requested by Dr Bassem Olmstead for evaluation of p several months ago, which makes it difficult for Hung Coley and his wife to see her decline    Substance History     Smoking status never  History of Substance abuse never    Allergies:   Pcns [Penicillins]      Runny nose  Sulfa Antibiotics       NAUSEA AND (H) 08/05/2019    BUN 15 08/05/2019     08/05/2019    K 4.2 08/05/2019     (H) 08/05/2019    CO2 21.0 08/05/2019    GLU 98 08/05/2019    CA 8.7 08/05/2019    ALB 2.8 (L) 08/05/2019    ALKPHO 107 08/05/2019    BILT 0.5 08/05/2019    TP 6.4 08/05 Full   60 Reduced Significant disease  Can’t perform hobby Occasional  Assist Normal or   Reduced Full or confused   50 Mainly sit/lie Extensive Disease  Can’t do any work   Partial Assist Normal or Reduced Full or confused   40 Mainly in bed Extensive Dis making preferences: tess Avila    POLST/CODE STATUS: We discussed the risks vs benefits of life sustaining treatments in the setting of advanced age and co-morbidities.    POLST reflects Wanda Jackson wishes for   DNR CODE STATUS  No intubation  No mechanical face to face contact, history taking, physical examination and >50% was spent counseling and coordinating care. We will continue to follow. Meche Hendricks MD   8/5/2019  11:12 AM

## 2019-08-06 VITALS
WEIGHT: 153.19 LBS | RESPIRATION RATE: 20 BRPM | BODY MASS INDEX: 28.19 KG/M2 | HEART RATE: 74 BPM | HEIGHT: 62 IN | DIASTOLIC BLOOD PRESSURE: 61 MMHG | TEMPERATURE: 98 F | OXYGEN SATURATION: 99 % | SYSTOLIC BLOOD PRESSURE: 143 MMHG

## 2019-08-06 LAB
BASOPHILS # BLD AUTO: 0.01 X10(3) UL (ref 0–0.2)
BASOPHILS NFR BLD AUTO: 0.2 %
DEPRECATED RDW RBC AUTO: 50.8 FL (ref 35.1–46.3)
EOSINOPHIL # BLD AUTO: 0.14 X10(3) UL (ref 0–0.7)
EOSINOPHIL NFR BLD AUTO: 3.1 %
ERYTHROCYTE [DISTWIDTH] IN BLOOD BY AUTOMATED COUNT: 15.4 % (ref 11–15)
HCT VFR BLD AUTO: 37.5 % (ref 35–48)
HGB BLD-MCNC: 11.7 G/DL (ref 12–16)
IMM GRANULOCYTES # BLD AUTO: 0.03 X10(3) UL (ref 0–1)
IMM GRANULOCYTES NFR BLD: 0.7 %
LYMPHOCYTES # BLD AUTO: 1.29 X10(3) UL (ref 1–4)
LYMPHOCYTES NFR BLD AUTO: 28.2 %
MCH RBC QN AUTO: 28.4 PG (ref 26–34)
MCHC RBC AUTO-ENTMCNC: 31.2 G/DL (ref 31–37)
MCV RBC AUTO: 91 FL (ref 80–100)
MONOCYTES # BLD AUTO: 0.46 X10(3) UL (ref 0.1–1)
MONOCYTES NFR BLD AUTO: 10.1 %
NEUTROPHILS # BLD AUTO: 2.64 X10 (3) UL (ref 1.5–7.7)
NEUTROPHILS # BLD AUTO: 2.64 X10(3) UL (ref 1.5–7.7)
NEUTROPHILS NFR BLD AUTO: 57.7 %
PLATELET # BLD AUTO: 106 10(3)UL (ref 150–450)
RBC # BLD AUTO: 4.12 X10(6)UL (ref 3.8–5.3)
WBC # BLD AUTO: 4.6 X10(3) UL (ref 4–11)

## 2019-08-06 PROCEDURE — 99232 SBSQ HOSP IP/OBS MODERATE 35: CPT | Performed by: OTHER

## 2019-08-06 NOTE — PLAN OF CARE
Pt alert and oriented. Denies pain. VSS. Afebrile. IVF. Pureed diet, thin liquids - tolerating well. Appetite improving. Meds crushed in applesauce. Bunny boots in place. Mepilex to R heel. TAWANDA hose. PT/OT to see. Plan for dc to [facility].   Will mon

## 2019-08-06 NOTE — PLAN OF CARE
Problem: Impaired Swallowing  Goal: Minimize aspiration risk  Description  Interventions:  Pureed and thin liquids   Outcome: Adequate for Discharge

## 2019-08-06 NOTE — PROGRESS NOTES
Medical chart reviewed  Visited [facility]  Awake, pleasantly chronically confused  Son not in room however RN told me that he is planning to bring Bar Raines to SNF via car. [place] POLST has been scanned in to EMR and is available for review.     Will sign off    Thank

## 2019-08-06 NOTE — PLAN OF CARE
Received patient awake in bed. Alert and oriented x3, appropriate. On room air. Clear lungs. SR on tele. Incontinent, sacrum is clear. IVfluids. Pt went down to MRI with Ativan IV x 1. Denies any pain. Has foam boots to bilateral foot.      Problem: Patient redness and/or skin breakdown  - Initiate interventions, skin care algorithm/standards of care as needed  Outcome: Progressing  Goal: Incision(s), wounds(s) or drain site(s) healing without S/S of infection  Description  INTERVENTIONS:  - Assess and docume

## 2019-08-06 NOTE — PROGRESS NOTES
[facility]  Progress Note    Marcelo Garcia Patient Status:  Observation    10/3/1935 MRN [id number]   [facility] 3NE-A Attending Porfirio Prado MD   Hosp Day # 0 PCP Raymond Moise MD         SUBJECTIVE:  Subjective:  Marcelo Garcia is Output —   Net 1230 ml       Exam          Neck:   Supple, symmetrical, trachea midline,        thyroid:      no JVD   Lungs:     Clear to auscultation bilaterally, respirations unlabored       Heart:    Regular rate and rhythm, S1 and S2 normal,     Abd Anemia    Thrombocytopenia (HCC)    Azotemia    Altered mental status    Dehydration    Counseling regarding advance care planning and goals of care          Plan:  Continue present management,  1.        Altered mental status, possible metabolic encephalop

## 2019-08-06 NOTE — DISCHARGE SUMMARY
[facility]  Discharge Summary    Kendal Hernandez Patient Status:  Observation    10/3/1935 MRN [id number]   [facility] 3NE-A Attending Doron Peter MD   Hosp Day # 0 PCP Hannah Lomeli MD     Date of Admission: 2019    Date of Dis significant stenosis or dissection.     RIGHT  INTERNAL CAROTID:    No hemodynamically significant stenosis or dissection. EXTERNAL CAROTID:    No hemodynamically significant stenosis or dissection.   COMMON CAROTID:    No hemodynamically significant steno 08/05/19  0648    143 141   K 4.5 4.2 4.2   * 116* 113*   CO2 25.0 23.0 21.0   BUN 36* 22* 15   CREATSERUM 1.58* 1.31* 1.26*   CA 9.3 8.4* 8.7   GLU 94 83 98               Recent Labs   Lab 08/02/19  0914 08/04/19  0636 08/05/19  0648   ALT 12*       Dementia.  Continue Namenda.     7.       Deep venous thrombosis prophylaxis.  Subcutaneous heparin.     8.       Gastrointestinal prophylaxis.  On famotidine.     9.       Dyslipidemia.  On statin.     10.     Weakness.  PT/OT evaluate and treat.    Refills: 11  Associated Diagnoses:Poor short term memory    amLODIPine Besylate 2.5 MG Oral Tab  Take 2.5 mg by mouth daily. Refills: 1    raNITIdine HCl 150 MG Oral Tab  Take 150 mg by mouth daily.     Methenamine Hippurate 1 G Oral Tab  Take 1 g by mouth

## 2019-08-06 NOTE — SLP NOTE
SPEECH DAILY NOTE - INPATIENT    ASSESSMENT & PLAN   ASSESSMENT  Pt seen at bedside this AM for speech therapy services. Pt cooperative, pleasant, and alert. Per RN, pt tolerating diet well with no concerns of aspiration.  Trial thin liquids via cup with st

## 2019-08-06 NOTE — CM/SW NOTE
Discharge Plan Status:    Projected Destination: Subacute Rehab  Current Barriers to d/c: Medical Clearance.   Pt/Family aware of plan: MSW called \"stated\" HCPOA-no from in EMR ([address] [place] [address] [phone]) and left VM.  1:05pm MSW called Bill-son back- he want

## 2019-08-06 NOTE — PLAN OF CARE
NURSING DISCHARGE NOTE    Discharged to [facility] via wheelchair. Accompanied by support staff. Belongings sent with pt. Discharge instructions, f/u appointment discussed with pt's son.  Paperwork for [facility] placed in envelope, provided to pt's son,

## 2020-01-01 ENCOUNTER — ANESTHESIA (OUTPATIENT)
Dept: SURGERY | Facility: HOSPITAL | Age: 85
DRG: 853 | End: 2020-01-01
Payer: MEDICARE

## 2020-01-01 ENCOUNTER — APPOINTMENT (OUTPATIENT)
Dept: CV DIAGNOSTICS | Facility: HOSPITAL | Age: 85
DRG: 853 | End: 2020-01-01
Attending: INTERNAL MEDICINE
Payer: MEDICARE

## 2020-01-01 ENCOUNTER — HOSPITAL ENCOUNTER (EMERGENCY)
Facility: HOSPITAL | Age: 85
Discharge: STILL ADMITTED OR ADMINISTRATIVE | End: 2020-01-01
Payer: MEDICARE

## 2020-01-01 ENCOUNTER — APPOINTMENT (OUTPATIENT)
Dept: ULTRASOUND IMAGING | Facility: HOSPITAL | Age: 85
DRG: 871 | End: 2020-01-01
Attending: HOSPITALIST
Payer: MEDICARE

## 2020-01-01 ENCOUNTER — APPOINTMENT (OUTPATIENT)
Dept: CT IMAGING | Facility: HOSPITAL | Age: 85
DRG: 871 | End: 2020-01-01
Attending: INTERNAL MEDICINE
Payer: MEDICARE

## 2020-01-01 ENCOUNTER — APPOINTMENT (OUTPATIENT)
Dept: GENERAL RADIOLOGY | Facility: HOSPITAL | Age: 85
DRG: 853 | End: 2020-01-01
Attending: NURSE PRACTITIONER
Payer: MEDICARE

## 2020-01-01 ENCOUNTER — APPOINTMENT (OUTPATIENT)
Dept: GENERAL RADIOLOGY | Facility: HOSPITAL | Age: 85
DRG: 871 | End: 2020-01-01
Attending: NURSE PRACTITIONER
Payer: MEDICARE

## 2020-01-01 ENCOUNTER — APPOINTMENT (OUTPATIENT)
Dept: ULTRASOUND IMAGING | Facility: HOSPITAL | Age: 85
DRG: 853 | End: 2020-01-01
Attending: INTERNAL MEDICINE
Payer: MEDICARE

## 2020-01-01 ENCOUNTER — APPOINTMENT (OUTPATIENT)
Dept: CT IMAGING | Facility: HOSPITAL | Age: 85
DRG: 853 | End: 2020-01-01
Attending: EMERGENCY MEDICINE
Payer: MEDICARE

## 2020-01-01 ENCOUNTER — APPOINTMENT (OUTPATIENT)
Dept: GENERAL RADIOLOGY | Facility: HOSPITAL | Age: 85
DRG: 871 | End: 2020-01-01
Attending: HOSPITALIST
Payer: MEDICARE

## 2020-01-01 ENCOUNTER — APPOINTMENT (OUTPATIENT)
Dept: GENERAL RADIOLOGY | Facility: HOSPITAL | Age: 85
DRG: 853 | End: 2020-01-01
Attending: EMERGENCY MEDICINE
Payer: MEDICARE

## 2020-01-01 ENCOUNTER — APPOINTMENT (OUTPATIENT)
Dept: CT IMAGING | Facility: HOSPITAL | Age: 85
DRG: 853 | End: 2020-01-01
Attending: PHYSICIAN ASSISTANT
Payer: MEDICARE

## 2020-01-01 ENCOUNTER — ANESTHESIA EVENT (OUTPATIENT)
Dept: SURGERY | Facility: HOSPITAL | Age: 85
DRG: 853 | End: 2020-01-01
Payer: MEDICARE

## 2020-01-01 ENCOUNTER — MED REC SCAN ONLY (OUTPATIENT)
Dept: SURGERY | Facility: CLINIC | Age: 85
End: 2020-01-01

## 2020-01-01 ENCOUNTER — APPOINTMENT (OUTPATIENT)
Dept: GENERAL RADIOLOGY | Facility: HOSPITAL | Age: 85
End: 2020-01-01
Attending: EMERGENCY MEDICINE
Payer: MEDICARE

## 2020-01-01 ENCOUNTER — HOSPITAL ENCOUNTER (EMERGENCY)
Facility: HOSPITAL | Age: 85
Discharge: HOME | End: 2020-01-01
Attending: EMERGENCY MEDICINE
Payer: MEDICARE

## 2020-01-01 ENCOUNTER — APPOINTMENT (OUTPATIENT)
Dept: CT IMAGING | Facility: HOSPITAL | Age: 85
End: 2020-01-01
Attending: EMERGENCY MEDICINE
Payer: MEDICARE

## 2020-01-01 ENCOUNTER — HOSPITAL ENCOUNTER (INPATIENT)
Facility: HOSPITAL | Age: 85
LOS: 24 days | Discharge: NURSING, REHAB OR LONG-TERM CARE | DRG: 853 | End: 2020-01-01
Attending: EMERGENCY MEDICINE | Admitting: INTERNAL MEDICINE
Payer: MEDICARE

## 2020-01-01 ENCOUNTER — APPOINTMENT (OUTPATIENT)
Dept: CT IMAGING | Facility: HOSPITAL | Age: 85
DRG: 853 | End: 2020-01-01
Attending: INTERNAL MEDICINE
Payer: MEDICARE

## 2020-01-01 ENCOUNTER — HOSPITAL ENCOUNTER (INPATIENT)
Facility: HOSPITAL | Age: 85
LOS: 11 days | Discharge: NURSING, REHAB OR LONG-TERM CARE | DRG: 871 | End: 2020-01-01
Attending: EMERGENCY MEDICINE | Admitting: HOSPITALIST
Payer: MEDICARE

## 2020-01-01 ENCOUNTER — APPOINTMENT (OUTPATIENT)
Dept: CV DIAGNOSTICS | Facility: HOSPITAL | Age: 85
DRG: 871 | End: 2020-01-01
Attending: HOSPITALIST
Payer: MEDICARE

## 2020-01-01 ENCOUNTER — HOSPITAL ENCOUNTER (INPATIENT)
Facility: HOSPITAL | Age: 85
LOS: 2 days | DRG: 871 | End: 2020-01-01
Attending: EMERGENCY MEDICINE | Admitting: INTERNAL MEDICINE
Payer: MEDICARE

## 2020-01-01 ENCOUNTER — APPOINTMENT (OUTPATIENT)
Dept: GENERAL RADIOLOGY | Facility: HOSPITAL | Age: 85
DRG: 871 | End: 2020-01-01
Attending: EMERGENCY MEDICINE
Payer: MEDICARE

## 2020-01-01 ENCOUNTER — APPOINTMENT (OUTPATIENT)
Dept: INTERVENTIONAL RADIOLOGY/VASCULAR | Facility: HOSPITAL | Age: 85
DRG: 853 | End: 2020-01-01
Attending: INTERNAL MEDICINE
Payer: MEDICARE

## 2020-01-01 ENCOUNTER — APPOINTMENT (OUTPATIENT)
Dept: GENERAL RADIOLOGY | Facility: HOSPITAL | Age: 85
DRG: 871 | End: 2020-01-01
Attending: INTERNAL MEDICINE
Payer: MEDICARE

## 2020-01-01 VITALS
BODY MASS INDEX: 29.72 KG/M2 | DIASTOLIC BLOOD PRESSURE: 18 MMHG | TEMPERATURE: 86 F | RESPIRATION RATE: 7 BRPM | DIASTOLIC BLOOD PRESSURE: 65 MMHG | TEMPERATURE: 98 F | SYSTOLIC BLOOD PRESSURE: 35 MMHG | WEIGHT: 188.69 LBS | HEIGHT: 61 IN | WEIGHT: 157.44 LBS | BODY MASS INDEX: 33 KG/M2 | SYSTOLIC BLOOD PRESSURE: 122 MMHG | HEART RATE: 57 BPM | OXYGEN SATURATION: 65 % | RESPIRATION RATE: 14 BRPM | OXYGEN SATURATION: 100 % | HEART RATE: 30 BPM

## 2020-01-01 VITALS
OXYGEN SATURATION: 100 % | TEMPERATURE: 98 F | BODY MASS INDEX: 26.88 KG/M2 | DIASTOLIC BLOOD PRESSURE: 66 MMHG | SYSTOLIC BLOOD PRESSURE: 154 MMHG | WEIGHT: 157.44 LBS | RESPIRATION RATE: 16 BRPM | HEIGHT: 64 IN | HEART RATE: 59 BPM

## 2020-01-01 VITALS
SYSTOLIC BLOOD PRESSURE: 156 MMHG | BODY MASS INDEX: 33.43 KG/M2 | WEIGHT: 188.69 LBS | TEMPERATURE: 96 F | HEART RATE: 77 BPM | DIASTOLIC BLOOD PRESSURE: 83 MMHG | HEIGHT: 63 IN | OXYGEN SATURATION: 100 % | RESPIRATION RATE: 18 BRPM

## 2020-01-01 VITALS
TEMPERATURE: 98 F | HEART RATE: 68 BPM | DIASTOLIC BLOOD PRESSURE: 77 MMHG | RESPIRATION RATE: 19 BRPM | OXYGEN SATURATION: 97 % | SYSTOLIC BLOOD PRESSURE: 119 MMHG

## 2020-01-01 DIAGNOSIS — N19 RENAL FAILURE, UNSPECIFIED CHRONICITY: ICD-10-CM
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
## 2020-01-01 DIAGNOSIS — D69.6 THROMBOCYTOPENIA (HCC): ICD-10-CM

## 2020-01-01 DIAGNOSIS — S00.81XA ABRASION OF FACE, INITIAL ENCOUNTER: ICD-10-CM

## 2020-01-01 DIAGNOSIS — A41.9 SEPSIS DUE TO UNDETERMINED ORGANISM (HCC): ICD-10-CM

## 2020-01-01 DIAGNOSIS — D64.9 ANEMIA, UNSPECIFIED TYPE: ICD-10-CM

## 2020-01-01 DIAGNOSIS — J18.9 NOSOCOMIAL PNEUMONIA: Primary | ICD-10-CM

## 2020-01-01 DIAGNOSIS — R77.8 ELEVATED TROPONIN: ICD-10-CM

## 2020-01-01 DIAGNOSIS — R77.8 TROPONIN LEVEL ELEVATED: ICD-10-CM

## 2020-01-01 DIAGNOSIS — R40.0 SOMNOLENCE: Primary | ICD-10-CM

## 2020-01-01 DIAGNOSIS — N30.01 ACUTE CYSTITIS WITH HEMATURIA: ICD-10-CM

## 2020-01-01 DIAGNOSIS — B99.9 INFECTION: ICD-10-CM

## 2020-01-01 DIAGNOSIS — N17.9 AKI (ACUTE KIDNEY INJURY) (HCC): ICD-10-CM

## 2020-01-01 DIAGNOSIS — N39.0 URINARY TRACT INFECTION WITHOUT HEMATURIA, SITE UNSPECIFIED: ICD-10-CM

## 2020-01-01 DIAGNOSIS — S00.83XA CONTUSION OF FOREHEAD, INITIAL ENCOUNTER: Primary | ICD-10-CM

## 2020-01-01 DIAGNOSIS — R41.82 ALTERED MENTAL STATUS, UNSPECIFIED ALTERED MENTAL STATUS TYPE: Primary | ICD-10-CM

## 2020-01-01 DIAGNOSIS — R53.1 WEAKNESS: ICD-10-CM

## 2020-01-01 DIAGNOSIS — E86.0 DEHYDRATION: Primary | ICD-10-CM

## 2020-01-01 LAB
ALBUMIN SERPL-MCNC: 1.1 G/DL (ref 3.4–5)
ALBUMIN SERPL-MCNC: 1.2 G/DL (ref 3.4–5)
ALBUMIN SERPL-MCNC: 1.3 G/DL (ref 3.4–5)
ALBUMIN SERPL-MCNC: 1.4 G/DL (ref 3.4–5)
ALBUMIN SERPL-MCNC: 1.5 G/DL (ref 3.4–5)
ALBUMIN SERPL-MCNC: 1.5 G/DL (ref 3.4–5)
ALBUMIN SERPL-MCNC: 1.6 G/DL (ref 3.4–5)
ALBUMIN SERPL-MCNC: 1.6 G/DL (ref 3.4–5)
ALBUMIN SERPL-MCNC: 2.8 G/DL (ref 3.4–5)
ALBUMIN/GLOB SERPL: 0.3 {RATIO} (ref 1–2)
ALBUMIN/GLOB SERPL: 0.4 {RATIO} (ref 1–2)
ALBUMIN/GLOB SERPL: 0.7 {RATIO} (ref 1–2)
ALP LIVER SERPL-CCNC: 100 U/L (ref 55–142)
ALP LIVER SERPL-CCNC: 103 U/L (ref 55–142)
ALP LIVER SERPL-CCNC: 103 U/L (ref 55–142)
ALP LIVER SERPL-CCNC: 106 U/L (ref 55–142)
ALP LIVER SERPL-CCNC: 137 U/L (ref 55–142)
ALP LIVER SERPL-CCNC: 146 U/L (ref 55–142)
ALP LIVER SERPL-CCNC: 156 U/L (ref 55–142)
ALP LIVER SERPL-CCNC: 175 U/L (ref 55–142)
ALP LIVER SERPL-CCNC: 72 U/L (ref 55–142)
ALP LIVER SERPL-CCNC: 76 U/L (ref 55–142)
ALP LIVER SERPL-CCNC: 77 U/L (ref 55–142)
ALP LIVER SERPL-CCNC: 79 U/L (ref 55–142)
ALP LIVER SERPL-CCNC: 83 U/L (ref 55–142)
ALP LIVER SERPL-CCNC: 87 U/L (ref 55–142)
ALP LIVER SERPL-CCNC: 89 U/L (ref 55–142)
ALP LIVER SERPL-CCNC: 92 U/L (ref 55–142)
ALP LIVER SERPL-CCNC: 95 U/L (ref 55–142)
ALP LIVER SERPL-CCNC: 96 U/L (ref 55–142)
ALP LIVER SERPL-CCNC: 97 U/L (ref 55–142)
ALT SERPL-CCNC: 10 U/L (ref 13–56)
ALT SERPL-CCNC: 10 U/L (ref 13–56)
ALT SERPL-CCNC: 11 U/L (ref 13–56)
ALT SERPL-CCNC: 11 U/L (ref 13–56)
ALT SERPL-CCNC: 13 U/L (ref 13–56)
ALT SERPL-CCNC: 15 U/L (ref 13–56)
ALT SERPL-CCNC: 16 U/L (ref 13–56)
ALT SERPL-CCNC: 17 U/L (ref 13–56)
ALT SERPL-CCNC: 17 U/L (ref 13–56)
ALT SERPL-CCNC: 18 U/L (ref 13–56)
ALT SERPL-CCNC: 23 U/L (ref 13–56)
ALT SERPL-CCNC: 26 U/L (ref 13–56)
ALT SERPL-CCNC: 35 U/L (ref 13–56)
ALT SERPL-CCNC: 54 U/L (ref 13–56)
ALT SERPL-CCNC: 9 U/L (ref 13–56)
ANION GAP SERPL CALC-SCNC: 0 MMOL/L (ref 0–18)
ANION GAP SERPL CALC-SCNC: 2 MMOL/L (ref 0–18)
ANION GAP SERPL CALC-SCNC: 2 MMOL/L (ref 0–18)
ANION GAP SERPL CALC-SCNC: 3 MMOL/L (ref 0–18)
ANION GAP SERPL CALC-SCNC: 4 MMOL/L (ref 0–18)
ANION GAP SERPL CALC-SCNC: 5 MMOL/L (ref 0–18)
ANION GAP SERPL CALC-SCNC: 6 MMOL/L (ref 0–18)
ANION GAP SERPL CALC-SCNC: 7 MMOL/L (ref 0–18)
ANION GAP SERPL CALC-SCNC: 8 MMOL/L (ref 0–18)
ANION GAP SERPL CALC-SCNC: 9 MMOL/L (ref 0–18)
ANTIBODY SCREEN: NEGATIVE
ANTIBODY SCREEN: NEGATIVE
APTT PPP: 38 SECONDS (ref 25.4–36.1)
AST SERPL-CCNC: 11 U/L (ref 15–37)
AST SERPL-CCNC: 12 U/L (ref 15–37)
AST SERPL-CCNC: 13 U/L (ref 15–37)
AST SERPL-CCNC: 13 U/L (ref 15–37)
AST SERPL-CCNC: 15 U/L (ref 15–37)
AST SERPL-CCNC: 18 U/L (ref 15–37)
AST SERPL-CCNC: 19 U/L (ref 15–37)
AST SERPL-CCNC: 19 U/L (ref 15–37)
AST SERPL-CCNC: 20 U/L (ref 15–37)
AST SERPL-CCNC: 20 U/L (ref 15–37)
AST SERPL-CCNC: 22 U/L (ref 15–37)
AST SERPL-CCNC: 23 U/L (ref 15–37)
AST SERPL-CCNC: 23 U/L (ref 15–37)
AST SERPL-CCNC: 24 U/L (ref 15–37)
AST SERPL-CCNC: 26 U/L (ref 15–37)
AST SERPL-CCNC: 40 U/L (ref 15–37)
AST SERPL-CCNC: 8 U/L (ref 15–37)
ATRIAL RATE: 141 BPM
ATRIAL RATE: 69 BPM
BASOPHILS # BLD AUTO: 0 X10(3) UL (ref 0–0.2)
BASOPHILS # BLD AUTO: 0.01 X10(3) UL (ref 0–0.2)
BASOPHILS # BLD AUTO: 0.02 X10(3) UL (ref 0–0.2)
BASOPHILS # BLD AUTO: 0.03 X10(3) UL (ref 0–0.2)
BASOPHILS # BLD: 0 X10(3) UL (ref 0–0.2)
BASOPHILS # BLD: 0 X10(3) UL (ref 0–0.2)
BASOPHILS NFR BLD AUTO: 0 %
BASOPHILS NFR BLD AUTO: 0.1 %
BASOPHILS NFR BLD AUTO: 0.2 %
BASOPHILS NFR BLD AUTO: 0.3 %
BASOPHILS NFR BLD AUTO: 0.4 %
BASOPHILS NFR BLD AUTO: 0.4 %
BASOPHILS NFR BLD: 0 %
BASOPHILS NFR BLD: 0 %
BILIRUB SERPL-MCNC: 0.2 MG/DL (ref 0.1–2)
BILIRUB SERPL-MCNC: 0.2 MG/DL (ref 0.1–2)
BILIRUB SERPL-MCNC: 0.3 MG/DL (ref 0.1–2)
BILIRUB SERPL-MCNC: 0.4 MG/DL (ref 0.1–2)
BILIRUB SERPL-MCNC: 0.5 MG/DL (ref 0.1–2)
BILIRUB SERPL-MCNC: 0.7 MG/DL (ref 0.1–2)
BILIRUB SERPL-MCNC: 0.8 MG/DL (ref 0.1–2)
BILIRUB SERPL-MCNC: 0.9 MG/DL (ref 0.1–2)
BILIRUB SERPL-MCNC: 0.9 MG/DL (ref 0.1–2)
BILIRUB UR QL STRIP.AUTO: NEGATIVE
BILIRUB UR QL STRIP.AUTO: NEGATIVE
BLOOD TYPE BARCODE: 6200
BLOOD TYPE BARCODE: 6200
BUN BLD-MCNC: 128 MG/DL (ref 7–18)
BUN BLD-MCNC: 144 MG/DL (ref 7–18)
BUN BLD-MCNC: 18 MG/DL (ref 7–18)
BUN BLD-MCNC: 20 MG/DL (ref 7–18)
BUN BLD-MCNC: 22 MG/DL (ref 7–18)
BUN BLD-MCNC: 23 MG/DL (ref 7–18)
BUN BLD-MCNC: 23 MG/DL (ref 7–18)
BUN BLD-MCNC: 26 MG/DL (ref 7–18)
BUN BLD-MCNC: 27 MG/DL (ref 7–18)
BUN BLD-MCNC: 28 MG/DL (ref 7–18)
BUN BLD-MCNC: 30 MG/DL (ref 7–18)
BUN BLD-MCNC: 34 MG/DL (ref 7–18)
BUN BLD-MCNC: 43 MG/DL (ref 7–18)
BUN BLD-MCNC: 51 MG/DL (ref 7–18)
BUN BLD-MCNC: 65 MG/DL (ref 7–18)
BUN BLD-MCNC: 89 MG/DL (ref 7–18)
BUN BLD-MCNC: 98 MG/DL (ref 7–18)
BUN/CREAT SERPL: 20 (ref 10–20)
BUN/CREAT SERPL: 21.3 (ref 10–20)
BUN/CREAT SERPL: 23 (ref 10–20)
BUN/CREAT SERPL: 23.2 (ref 10–20)
BUN/CREAT SERPL: 23.7 (ref 10–20)
BUN/CREAT SERPL: 24.1 (ref 10–20)
BUN/CREAT SERPL: 24.5 (ref 10–20)
BUN/CREAT SERPL: 24.8 (ref 10–20)
BUN/CREAT SERPL: 25.7 (ref 10–20)
BUN/CREAT SERPL: 26 (ref 10–20)
BUN/CREAT SERPL: 27.1 (ref 10–20)
BUN/CREAT SERPL: 28.1 (ref 10–20)
BUN/CREAT SERPL: 28.4 (ref 10–20)
BUN/CREAT SERPL: 29.3 (ref 10–20)
BUN/CREAT SERPL: 30.7 (ref 10–20)
BUN/CREAT SERPL: 38.3 (ref 10–20)
BUN/CREAT SERPL: 40.4 (ref 10–20)
BUN/CREAT SERPL: 51 (ref 10–20)
BUN/CREAT SERPL: 51.1 (ref 10–20)
BUN/CREAT SERPL: 51.7 (ref 10–20)
BUN/CREAT SERPL: 53.1 (ref 10–20)
CALCIUM BLD-MCNC: 7.6 MG/DL (ref 8.5–10.1)
CALCIUM BLD-MCNC: 7.7 MG/DL (ref 8.5–10.1)
CALCIUM BLD-MCNC: 7.8 MG/DL (ref 8.5–10.1)
CALCIUM BLD-MCNC: 7.9 MG/DL (ref 8.5–10.1)
CALCIUM BLD-MCNC: 8 MG/DL (ref 8.5–10.1)
CALCIUM BLD-MCNC: 8 MG/DL (ref 8.5–10.1)
CALCIUM BLD-MCNC: 8.1 MG/DL (ref 8.5–10.1)
CALCIUM BLD-MCNC: 8.2 MG/DL (ref 8.5–10.1)
CALCIUM BLD-MCNC: 8.2 MG/DL (ref 8.5–10.1)
CALCIUM BLD-MCNC: 8.3 MG/DL (ref 8.5–10.1)
CALCIUM BLD-MCNC: 8.4 MG/DL (ref 8.5–10.1)
CALCIUM BLD-MCNC: 8.5 MG/DL (ref 8.5–10.1)
CALCIUM BLD-MCNC: 8.7 MG/DL (ref 8.5–10.1)
CALCIUM BLD-MCNC: 8.8 MG/DL (ref 8.5–10.1)
CALCIUM BLD-MCNC: 8.9 MG/DL (ref 8.5–10.1)
CALCIUM BLD-MCNC: 8.9 MG/DL (ref 8.5–10.1)
CHLORIDE SERPL-SCNC: 105 MMOL/L (ref 98–112)
CHLORIDE SERPL-SCNC: 107 MMOL/L (ref 98–112)
CHLORIDE SERPL-SCNC: 107 MMOL/L (ref 98–112)
CHLORIDE SERPL-SCNC: 108 MMOL/L (ref 98–112)
CHLORIDE SERPL-SCNC: 110 MMOL/L (ref 98–112)
CHLORIDE SERPL-SCNC: 112 MMOL/L (ref 98–112)
CHLORIDE SERPL-SCNC: 118 MMOL/L (ref 98–112)
CHLORIDE SERPL-SCNC: 119 MMOL/L (ref 98–112)
CHLORIDE SERPL-SCNC: 120 MMOL/L (ref 98–112)
CHLORIDE SERPL-SCNC: 120 MMOL/L (ref 98–112)
CHLORIDE SERPL-SCNC: 121 MMOL/L (ref 98–112)
CHLORIDE SERPL-SCNC: 121 MMOL/L (ref 98–112)
CHLORIDE SERPL-SCNC: 122 MMOL/L (ref 98–112)
CHLORIDE SERPL-SCNC: 122 MMOL/L (ref 98–112)
CO2 SERPL-SCNC: 19 MMOL/L (ref 21–32)
CO2 SERPL-SCNC: 19 MMOL/L (ref 21–32)
CO2 SERPL-SCNC: 21 MMOL/L (ref 21–32)
CO2 SERPL-SCNC: 22 MMOL/L (ref 21–32)
CO2 SERPL-SCNC: 23 MMOL/L (ref 21–32)
CO2 SERPL-SCNC: 24 MMOL/L (ref 21–32)
CO2 SERPL-SCNC: 25 MMOL/L (ref 21–32)
CO2 SERPL-SCNC: 25 MMOL/L (ref 21–32)
CO2 SERPL-SCNC: 26 MMOL/L (ref 21–32)
CO2 SERPL-SCNC: 28 MMOL/L (ref 21–32)
CO2 SERPL-SCNC: 28 MMOL/L (ref 21–32)
CO2 SERPL-SCNC: 29 MMOL/L (ref 21–32)
COLOR UR AUTO: YELLOW
COLOR UR AUTO: YELLOW
CREAT BLD-MCNC: 0.81 MG/DL (ref 0.55–1.02)
CREAT BLD-MCNC: 0.81 MG/DL (ref 0.55–1.02)
CREAT BLD-MCNC: 0.87 MG/DL (ref 0.55–1.02)
CREAT BLD-MCNC: 0.87 MG/DL (ref 0.55–1.02)
CREAT BLD-MCNC: 0.88 MG/DL (ref 0.55–1.02)
CREAT BLD-MCNC: 0.9 MG/DL (ref 0.55–1.02)
CREAT BLD-MCNC: 0.9 MG/DL (ref 0.55–1.02)
CREAT BLD-MCNC: 0.92 MG/DL (ref 0.55–1.02)
CREAT BLD-MCNC: 0.92 MG/DL (ref 0.55–1.02)
CREAT BLD-MCNC: 0.94 MG/DL (ref 0.55–1.02)
CREAT BLD-MCNC: 0.94 MG/DL (ref 0.55–1.02)
CREAT BLD-MCNC: 0.95 MG/DL (ref 0.55–1.02)
CREAT BLD-MCNC: 0.95 MG/DL (ref 0.55–1.02)
CREAT BLD-MCNC: 0.96 MG/DL (ref 0.55–1.02)
CREAT BLD-MCNC: 0.96 MG/DL (ref 0.55–1.02)
CREAT BLD-MCNC: 0.97 MG/DL (ref 0.55–1.02)
CREAT BLD-MCNC: 1.01 MG/DL (ref 0.55–1.02)
CREAT BLD-MCNC: 1.04 MG/DL (ref 0.55–1.02)
CREAT BLD-MCNC: 1.04 MG/DL (ref 0.55–1.02)
CREAT BLD-MCNC: 1.05 MG/DL (ref 0.55–1.02)
CREAT BLD-MCNC: 1.05 MG/DL (ref 0.55–1.02)
CREAT BLD-MCNC: 1.12 MG/DL (ref 0.55–1.02)
CREAT BLD-MCNC: 1.13 MG/DL (ref 0.55–1.02)
CREAT BLD-MCNC: 1.13 MG/DL (ref 0.55–1.02)
CREAT BLD-MCNC: 1.14 MG/DL (ref 0.55–1.02)
CREAT BLD-MCNC: 1.16 MG/DL (ref 0.55–1.02)
CREAT BLD-MCNC: 1.2 MG/DL (ref 0.55–1.02)
CREAT BLD-MCNC: 1.21 MG/DL (ref 0.55–1.02)
CREAT BLD-MCNC: 1.23 MG/DL (ref 0.55–1.02)
CREAT BLD-MCNC: 1.33 MG/DL (ref 0.55–1.02)
CREAT BLD-MCNC: 1.33 MG/DL (ref 0.55–1.02)
CREAT BLD-MCNC: 1.4 MG/DL (ref 0.55–1.02)
CREAT BLD-MCNC: 1.4 MG/DL (ref 0.55–1.02)
CREAT BLD-MCNC: 1.41 MG/DL (ref 0.55–1.02)
CREAT BLD-MCNC: 1.61 MG/DL (ref 0.55–1.02)
CREAT BLD-MCNC: 1.61 MG/DL (ref 0.55–1.02)
CREAT BLD-MCNC: 1.72 MG/DL (ref 0.55–1.02)
CREAT BLD-MCNC: 1.72 MG/DL (ref 0.55–1.02)
CREAT BLD-MCNC: 1.92 MG/DL (ref 0.55–1.02)
CREAT BLD-MCNC: 1.92 MG/DL (ref 0.55–1.02)
CREAT BLD-MCNC: 2.41 MG/DL (ref 0.55–1.02)
CREAT BLD-MCNC: 2.41 MG/DL (ref 0.55–1.02)
CREAT BLD-MCNC: 2.82 MG/DL (ref 0.55–1.02)
DEPRECATED HBV CORE AB SER IA-ACNC: 545.4 NG/ML (ref 18–340)
DEPRECATED RDW RBC AUTO: 49.7 FL (ref 35.1–46.3)
DEPRECATED RDW RBC AUTO: 49.8 FL (ref 35.1–46.3)
DEPRECATED RDW RBC AUTO: 50 FL (ref 35.1–46.3)
DEPRECATED RDW RBC AUTO: 50.1 FL (ref 35.1–46.3)
DEPRECATED RDW RBC AUTO: 50.8 FL (ref 35.1–46.3)
DEPRECATED RDW RBC AUTO: 51.2 FL (ref 35.1–46.3)
DEPRECATED RDW RBC AUTO: 51.4 FL (ref 35.1–46.3)
DEPRECATED RDW RBC AUTO: 51.9 FL (ref 35.1–46.3)
DEPRECATED RDW RBC AUTO: 52.2 FL (ref 35.1–46.3)
DEPRECATED RDW RBC AUTO: 52.4 FL (ref 35.1–46.3)
DEPRECATED RDW RBC AUTO: 52.6 FL (ref 35.1–46.3)
DEPRECATED RDW RBC AUTO: 54.2 FL (ref 35.1–46.3)
DEPRECATED RDW RBC AUTO: 54.2 FL (ref 35.1–46.3)
DEPRECATED RDW RBC AUTO: 55.8 FL (ref 35.1–46.3)
DEPRECATED RDW RBC AUTO: 56.1 FL (ref 35.1–46.3)
DEPRECATED RDW RBC AUTO: 56.2 FL (ref 35.1–46.3)
DEPRECATED RDW RBC AUTO: 57 FL (ref 35.1–46.3)
DEPRECATED RDW RBC AUTO: 57.1 FL (ref 35.1–46.3)
DEPRECATED RDW RBC AUTO: 58.2 FL (ref 35.1–46.3)
DEPRECATED RDW RBC AUTO: 58.2 FL (ref 35.1–46.3)
DEPRECATED RDW RBC AUTO: 58.4 FL (ref 35.1–46.3)
EOSINOPHIL # BLD AUTO: 0.06 X10(3) UL (ref 0–0.7)
EOSINOPHIL # BLD AUTO: 0.07 X10(3) UL (ref 0–0.7)
EOSINOPHIL # BLD AUTO: 0.07 X10(3) UL (ref 0–0.7)
EOSINOPHIL # BLD AUTO: 0.08 X10(3) UL (ref 0–0.7)
EOSINOPHIL # BLD AUTO: 0.09 X10(3) UL (ref 0–0.7)
EOSINOPHIL # BLD AUTO: 0.1 X10(3) UL (ref 0–0.7)
EOSINOPHIL # BLD AUTO: 0.11 X10(3) UL (ref 0–0.7)
EOSINOPHIL # BLD AUTO: 0.11 X10(3) UL (ref 0–0.7)
EOSINOPHIL # BLD AUTO: 0.13 X10(3) UL (ref 0–0.7)
EOSINOPHIL # BLD AUTO: 0.14 X10(3) UL (ref 0–0.7)
EOSINOPHIL # BLD AUTO: 0.17 X10(3) UL (ref 0–0.7)
EOSINOPHIL # BLD AUTO: 0.17 X10(3) UL (ref 0–0.7)
EOSINOPHIL # BLD AUTO: 0.18 X10(3) UL (ref 0–0.7)
EOSINOPHIL # BLD AUTO: 0.19 X10(3) UL (ref 0–0.7)
EOSINOPHIL # BLD AUTO: 0.2 X10(3) UL (ref 0–0.7)
EOSINOPHIL # BLD AUTO: 0.2 X10(3) UL (ref 0–0.7)
EOSINOPHIL # BLD AUTO: 0.25 X10(3) UL (ref 0–0.7)
EOSINOPHIL # BLD AUTO: 0.27 X10(3) UL (ref 0–0.7)
EOSINOPHIL # BLD AUTO: 0.36 X10(3) UL (ref 0–0.7)
EOSINOPHIL # BLD: 0 X10(3) UL (ref 0–0.7)
EOSINOPHIL # BLD: 0 X10(3) UL (ref 0–0.7)
EOSINOPHIL NFR BLD AUTO: 0.4 %
EOSINOPHIL NFR BLD AUTO: 0.6 %
EOSINOPHIL NFR BLD AUTO: 0.9 %
EOSINOPHIL NFR BLD AUTO: 1 %
EOSINOPHIL NFR BLD AUTO: 1.1 %
EOSINOPHIL NFR BLD AUTO: 1.4 %
EOSINOPHIL NFR BLD AUTO: 1.8 %
EOSINOPHIL NFR BLD AUTO: 2 %
EOSINOPHIL NFR BLD AUTO: 2.1 %
EOSINOPHIL NFR BLD AUTO: 2.2 %
EOSINOPHIL NFR BLD AUTO: 2.4 %
EOSINOPHIL NFR BLD AUTO: 2.4 %
EOSINOPHIL NFR BLD AUTO: 2.8 %
EOSINOPHIL NFR BLD AUTO: 3.1 %
EOSINOPHIL NFR BLD AUTO: 3.4 %
EOSINOPHIL NFR BLD AUTO: 4.1 %
EOSINOPHIL NFR BLD AUTO: 4.9 %
EOSINOPHIL NFR BLD: 0 %
EOSINOPHIL NFR BLD: 0 %
ERYTHROCYTE [DISTWIDTH] IN BLOOD BY AUTOMATED COUNT: 14.8 % (ref 11–15)
ERYTHROCYTE [DISTWIDTH] IN BLOOD BY AUTOMATED COUNT: 14.8 % (ref 11–15)
ERYTHROCYTE [DISTWIDTH] IN BLOOD BY AUTOMATED COUNT: 14.9 % (ref 11–15)
ERYTHROCYTE [DISTWIDTH] IN BLOOD BY AUTOMATED COUNT: 14.9 % (ref 11–15)
ERYTHROCYTE [DISTWIDTH] IN BLOOD BY AUTOMATED COUNT: 15 % (ref 11–15)
ERYTHROCYTE [DISTWIDTH] IN BLOOD BY AUTOMATED COUNT: 15 % (ref 11–15)
ERYTHROCYTE [DISTWIDTH] IN BLOOD BY AUTOMATED COUNT: 15.1 % (ref 11–15)
ERYTHROCYTE [DISTWIDTH] IN BLOOD BY AUTOMATED COUNT: 15.2 % (ref 11–15)
ERYTHROCYTE [DISTWIDTH] IN BLOOD BY AUTOMATED COUNT: 15.2 % (ref 11–15)
ERYTHROCYTE [DISTWIDTH] IN BLOOD BY AUTOMATED COUNT: 15.3 % (ref 11–15)
ERYTHROCYTE [DISTWIDTH] IN BLOOD BY AUTOMATED COUNT: 15.6 % (ref 11–15)
ERYTHROCYTE [DISTWIDTH] IN BLOOD BY AUTOMATED COUNT: 15.6 % (ref 11–15)
ERYTHROCYTE [DISTWIDTH] IN BLOOD BY AUTOMATED COUNT: 15.7 % (ref 11–15)
ERYTHROCYTE [DISTWIDTH] IN BLOOD BY AUTOMATED COUNT: 15.8 % (ref 11–15)
ERYTHROCYTE [DISTWIDTH] IN BLOOD BY AUTOMATED COUNT: 15.8 % (ref 11–15)
ERYTHROCYTE [DISTWIDTH] IN BLOOD BY AUTOMATED COUNT: 16.3 % (ref 11–15)
ERYTHROCYTE [DISTWIDTH] IN BLOOD BY AUTOMATED COUNT: 16.4 % (ref 11–15)
ERYTHROCYTE [DISTWIDTH] IN BLOOD BY AUTOMATED COUNT: 16.6 % (ref 11–15)
ERYTHROCYTE [DISTWIDTH] IN BLOOD BY AUTOMATED COUNT: 16.6 % (ref 11–15)
ERYTHROCYTE [DISTWIDTH] IN BLOOD BY AUTOMATED COUNT: 16.9 % (ref 11–15)
ERYTHROCYTE [DISTWIDTH] IN BLOOD BY AUTOMATED COUNT: 17 % (ref 11–15)
ERYTHROCYTE [DISTWIDTH] IN BLOOD BY AUTOMATED COUNT: 17.2 % (ref 11–15)
ERYTHROCYTE [DISTWIDTH] IN BLOOD BY AUTOMATED COUNT: 17.2 % (ref 11–15)
EST. AVERAGE GLUCOSE BLD GHB EST-MCNC: 114 MG/DL (ref 68–126)
GLOBULIN PLAS-MCNC: 3.5 G/DL (ref 2.8–4.4)
GLOBULIN PLAS-MCNC: 3.6 G/DL (ref 2.8–4.4)
GLOBULIN PLAS-MCNC: 3.6 G/DL (ref 2.8–4.4)
GLOBULIN PLAS-MCNC: 3.7 G/DL (ref 2.8–4.4)
GLOBULIN PLAS-MCNC: 3.9 G/DL (ref 2.8–4.4)
GLOBULIN PLAS-MCNC: 4 G/DL (ref 2.8–4.4)
GLOBULIN PLAS-MCNC: 4.1 G/DL (ref 2.8–4.4)
GLOBULIN PLAS-MCNC: 4.2 G/DL (ref 2.8–4.4)
GLOBULIN PLAS-MCNC: 4.2 G/DL (ref 2.8–4.4)
GLOBULIN PLAS-MCNC: 4.3 G/DL (ref 2.8–4.4)
GLOBULIN PLAS-MCNC: 4.3 G/DL (ref 2.8–4.4)
GLOBULIN PLAS-MCNC: 4.4 G/DL (ref 2.8–4.4)
GLOBULIN PLAS-MCNC: 4.5 G/DL (ref 2.8–4.4)
GLOBULIN PLAS-MCNC: 4.6 G/DL (ref 2.8–4.4)
GLOBULIN PLAS-MCNC: 5.3 G/DL (ref 2.8–4.4)
GLUCOSE BLD-MCNC: 100 MG/DL (ref 70–99)
GLUCOSE BLD-MCNC: 101 MG/DL (ref 70–99)
GLUCOSE BLD-MCNC: 101 MG/DL (ref 70–99)
GLUCOSE BLD-MCNC: 103 MG/DL (ref 70–99)
GLUCOSE BLD-MCNC: 103 MG/DL (ref 70–99)
GLUCOSE BLD-MCNC: 105 MG/DL (ref 70–99)
GLUCOSE BLD-MCNC: 105 MG/DL (ref 70–99)
GLUCOSE BLD-MCNC: 106 MG/DL (ref 70–99)
GLUCOSE BLD-MCNC: 108 MG/DL (ref 70–99)
GLUCOSE BLD-MCNC: 109 MG/DL (ref 70–99)
GLUCOSE BLD-MCNC: 110 MG/DL (ref 70–99)
GLUCOSE BLD-MCNC: 111 MG/DL (ref 70–99)
GLUCOSE BLD-MCNC: 111 MG/DL (ref 70–99)
GLUCOSE BLD-MCNC: 113 MG/DL (ref 70–99)
GLUCOSE BLD-MCNC: 115 MG/DL (ref 70–99)
GLUCOSE BLD-MCNC: 115 MG/DL (ref 70–99)
GLUCOSE BLD-MCNC: 116 MG/DL (ref 70–99)
GLUCOSE BLD-MCNC: 124 MG/DL (ref 70–99)
GLUCOSE BLD-MCNC: 125 MG/DL (ref 70–99)
GLUCOSE BLD-MCNC: 126 MG/DL (ref 70–99)
GLUCOSE BLD-MCNC: 128 MG/DL (ref 70–99)
GLUCOSE BLD-MCNC: 129 MG/DL (ref 70–99)
GLUCOSE BLD-MCNC: 129 MG/DL (ref 70–99)
GLUCOSE BLD-MCNC: 132 MG/DL (ref 70–99)
GLUCOSE BLD-MCNC: 132 MG/DL (ref 70–99)
GLUCOSE BLD-MCNC: 133 MG/DL (ref 70–99)
GLUCOSE BLD-MCNC: 135 MG/DL (ref 70–99)
GLUCOSE BLD-MCNC: 137 MG/DL (ref 70–99)
GLUCOSE BLD-MCNC: 138 MG/DL (ref 70–99)
GLUCOSE BLD-MCNC: 140 MG/DL (ref 70–99)
GLUCOSE BLD-MCNC: 141 MG/DL (ref 70–99)
GLUCOSE BLD-MCNC: 147 MG/DL (ref 70–99)
GLUCOSE BLD-MCNC: 148 MG/DL (ref 70–99)
GLUCOSE BLD-MCNC: 149 MG/DL (ref 70–99)
GLUCOSE BLD-MCNC: 150 MG/DL (ref 70–99)
GLUCOSE BLD-MCNC: 153 MG/DL (ref 70–99)
GLUCOSE BLD-MCNC: 155 MG/DL (ref 70–99)
GLUCOSE BLD-MCNC: 157 MG/DL (ref 70–99)
GLUCOSE BLD-MCNC: 157 MG/DL (ref 70–99)
GLUCOSE BLD-MCNC: 159 MG/DL (ref 70–99)
GLUCOSE BLD-MCNC: 166 MG/DL (ref 70–99)
GLUCOSE BLD-MCNC: 166 MG/DL (ref 70–99)
GLUCOSE BLD-MCNC: 169 MG/DL (ref 70–99)
GLUCOSE BLD-MCNC: 171 MG/DL (ref 70–99)
GLUCOSE BLD-MCNC: 171 MG/DL (ref 70–99)
GLUCOSE BLD-MCNC: 172 MG/DL (ref 70–99)
GLUCOSE BLD-MCNC: 174 MG/DL (ref 70–99)
GLUCOSE BLD-MCNC: 176 MG/DL (ref 70–99)
GLUCOSE BLD-MCNC: 177 MG/DL (ref 70–99)
GLUCOSE BLD-MCNC: 177 MG/DL (ref 70–99)
GLUCOSE BLD-MCNC: 179 MG/DL (ref 70–99)
GLUCOSE BLD-MCNC: 182 MG/DL (ref 70–99)
GLUCOSE BLD-MCNC: 182 MG/DL (ref 70–99)
GLUCOSE BLD-MCNC: 184 MG/DL (ref 70–99)
GLUCOSE BLD-MCNC: 190 MG/DL (ref 70–99)
GLUCOSE BLD-MCNC: 194 MG/DL (ref 70–99)
GLUCOSE BLD-MCNC: 198 MG/DL (ref 70–99)
GLUCOSE BLD-MCNC: 198 MG/DL (ref 70–99)
GLUCOSE BLD-MCNC: 207 MG/DL (ref 70–99)
GLUCOSE BLD-MCNC: 240 MG/DL (ref 70–99)
GLUCOSE BLD-MCNC: 243 MG/DL (ref 70–99)
GLUCOSE BLD-MCNC: 244 MG/DL (ref 70–99)
GLUCOSE BLD-MCNC: 246 MG/DL (ref 70–99)
GLUCOSE BLD-MCNC: 74 MG/DL (ref 70–99)
GLUCOSE BLD-MCNC: 80 MG/DL (ref 70–99)
GLUCOSE BLD-MCNC: 80 MG/DL (ref 70–99)
GLUCOSE BLD-MCNC: 81 MG/DL (ref 70–99)
GLUCOSE BLD-MCNC: 81 MG/DL (ref 70–99)
GLUCOSE BLD-MCNC: 82 MG/DL (ref 70–99)
GLUCOSE BLD-MCNC: 83 MG/DL (ref 70–99)
GLUCOSE BLD-MCNC: 83 MG/DL (ref 70–99)
GLUCOSE BLD-MCNC: 84 MG/DL (ref 70–99)
GLUCOSE BLD-MCNC: 85 MG/DL (ref 70–99)
GLUCOSE BLD-MCNC: 86 MG/DL (ref 70–99)
GLUCOSE BLD-MCNC: 87 MG/DL (ref 70–99)
GLUCOSE BLD-MCNC: 87 MG/DL (ref 70–99)
GLUCOSE BLD-MCNC: 89 MG/DL (ref 70–99)
GLUCOSE BLD-MCNC: 90 MG/DL (ref 70–99)
GLUCOSE BLD-MCNC: 91 MG/DL (ref 70–99)
GLUCOSE BLD-MCNC: 92 MG/DL (ref 70–99)
GLUCOSE BLD-MCNC: 93 MG/DL (ref 70–99)
GLUCOSE BLD-MCNC: 93 MG/DL (ref 70–99)
GLUCOSE BLD-MCNC: 94 MG/DL (ref 70–99)
GLUCOSE BLD-MCNC: 94 MG/DL (ref 70–99)
GLUCOSE BLD-MCNC: 96 MG/DL (ref 70–99)
GLUCOSE BLD-MCNC: 98 MG/DL (ref 70–99)
GLUCOSE BLD-MCNC: 98 MG/DL (ref 70–99)
GLUCOSE BLD-MCNC: 99 MG/DL (ref 70–99)
GLUCOSE BLD-MCNC: 99 MG/DL (ref 70–99)
GLUCOSE UR STRIP.AUTO-MCNC: NEGATIVE MG/DL
GLUCOSE UR STRIP.AUTO-MCNC: NEGATIVE MG/DL
HAV IGM SER QL: 2 MG/DL (ref 1.6–2.6)
HAV IGM SER QL: 2 MG/DL (ref 1.6–2.6)
HAV IGM SER QL: 2.1 MG/DL (ref 1.6–2.6)
HAV IGM SER QL: 2.1 MG/DL (ref 1.6–2.6)
HAV IGM SER QL: 2.2 MG/DL (ref 1.6–2.6)
HAV IGM SER QL: 2.3 MG/DL (ref 1.6–2.6)
HBA1C MFR BLD HPLC: 5.6 % (ref ?–5.7)
HCT VFR BLD AUTO: 21.4 % (ref 35–48)
HCT VFR BLD AUTO: 22 % (ref 35–48)
HCT VFR BLD AUTO: 23.1 % (ref 35–48)
HCT VFR BLD AUTO: 24.1 % (ref 35–48)
HCT VFR BLD AUTO: 24.2 % (ref 35–48)
HCT VFR BLD AUTO: 24.6 % (ref 35–48)
HCT VFR BLD AUTO: 25.8 % (ref 35–48)
HCT VFR BLD AUTO: 25.9 % (ref 35–48)
HCT VFR BLD AUTO: 26.3 % (ref 35–48)
HCT VFR BLD AUTO: 26.3 % (ref 35–48)
HCT VFR BLD AUTO: 26.7 % (ref 35–48)
HCT VFR BLD AUTO: 26.8 % (ref 35–48)
HCT VFR BLD AUTO: 26.9 % (ref 35–48)
HCT VFR BLD AUTO: 27 % (ref 35–48)
HCT VFR BLD AUTO: 27.4 % (ref 35–48)
HCT VFR BLD AUTO: 27.4 % (ref 35–48)
HCT VFR BLD AUTO: 27.5 % (ref 35–48)
HCT VFR BLD AUTO: 28 % (ref 35–48)
HCT VFR BLD AUTO: 29.5 % (ref 35–48)
HCT VFR BLD AUTO: 30 % (ref 35–48)
HCT VFR BLD AUTO: 30.4 % (ref 35–48)
HCT VFR BLD AUTO: 30.6 % (ref 35–48)
HCT VFR BLD AUTO: 36.5 % (ref 35–48)
HGB BLD-MCNC: 11.7 G/DL (ref 12–16)
HGB BLD-MCNC: 6.6 G/DL (ref 12–16)
HGB BLD-MCNC: 6.7 G/DL (ref 12–16)
HGB BLD-MCNC: 7.2 G/DL (ref 12–16)
HGB BLD-MCNC: 7.3 G/DL (ref 12–16)
HGB BLD-MCNC: 7.4 G/DL (ref 12–16)
HGB BLD-MCNC: 7.5 G/DL (ref 12–16)
HGB BLD-MCNC: 7.8 G/DL (ref 12–16)
HGB BLD-MCNC: 7.9 G/DL (ref 12–16)
HGB BLD-MCNC: 7.9 G/DL (ref 12–16)
HGB BLD-MCNC: 8 G/DL (ref 12–16)
HGB BLD-MCNC: 8.1 G/DL (ref 12–16)
HGB BLD-MCNC: 8.2 G/DL (ref 12–16)
HGB BLD-MCNC: 8.2 G/DL (ref 12–16)
HGB BLD-MCNC: 8.4 G/DL (ref 12–16)
HGB BLD-MCNC: 8.4 G/DL (ref 12–16)
HGB BLD-MCNC: 8.5 G/DL (ref 12–16)
HGB BLD-MCNC: 8.8 G/DL (ref 12–16)
HGB BLD-MCNC: 8.8 G/DL (ref 12–16)
HGB BLD-MCNC: 8.9 G/DL (ref 12–16)
HGB BLD-MCNC: 8.9 G/DL (ref 12–16)
HGB BLD-MCNC: 9.2 G/DL (ref 12–16)
HGB BLD-MCNC: 9.2 G/DL (ref 12–16)
HGB BLD-MCNC: 9.3 G/DL (ref 12–16)
HGB BLD-MCNC: 9.6 G/DL (ref 12–16)
IMM GRANULOCYTES # BLD AUTO: 0.03 X10(3) UL (ref 0–1)
IMM GRANULOCYTES # BLD AUTO: 0.04 X10(3) UL (ref 0–1)
IMM GRANULOCYTES # BLD AUTO: 0.06 X10(3) UL (ref 0–1)
IMM GRANULOCYTES # BLD AUTO: 0.07 X10(3) UL (ref 0–1)
IMM GRANULOCYTES # BLD AUTO: 0.11 X10(3) UL (ref 0–1)
IMM GRANULOCYTES # BLD AUTO: 0.14 X10(3) UL (ref 0–1)
IMM GRANULOCYTES # BLD AUTO: 0.14 X10(3) UL (ref 0–1)
IMM GRANULOCYTES # BLD AUTO: 0.15 X10(3) UL (ref 0–1)
IMM GRANULOCYTES # BLD AUTO: 0.16 X10(3) UL (ref 0–1)
IMM GRANULOCYTES # BLD AUTO: 0.18 X10(3) UL (ref 0–1)
IMM GRANULOCYTES # BLD AUTO: 0.24 X10(3) UL (ref 0–1)
IMM GRANULOCYTES # BLD AUTO: 0.31 X10(3) UL (ref 0–1)
IMM GRANULOCYTES # BLD AUTO: 0.43 X10(3) UL (ref 0–1)
IMM GRANULOCYTES NFR BLD: 0.3 %
IMM GRANULOCYTES NFR BLD: 0.4 %
IMM GRANULOCYTES NFR BLD: 0.5 %
IMM GRANULOCYTES NFR BLD: 0.7 %
IMM GRANULOCYTES NFR BLD: 0.8 %
IMM GRANULOCYTES NFR BLD: 0.8 %
IMM GRANULOCYTES NFR BLD: 1 %
IMM GRANULOCYTES NFR BLD: 1.4 %
IMM GRANULOCYTES NFR BLD: 1.6 %
IMM GRANULOCYTES NFR BLD: 1.7 %
IMM GRANULOCYTES NFR BLD: 2 %
IMM GRANULOCYTES NFR BLD: 2.1 %
IMM GRANULOCYTES NFR BLD: 2.1 %
IMM GRANULOCYTES NFR BLD: 2.2 %
IMM GRANULOCYTES NFR BLD: 2.4 %
IMM GRANULOCYTES NFR BLD: 2.9 %
INR BLD: 2.38 (ref 0.89–1.11)
INR BLD: 2.56 (ref 0.89–1.11)
IRON SATURATION: 21 % (ref 15–50)
IRON SERPL-MCNC: 39 UG/DL (ref 50–170)
KETONES UR STRIP.AUTO-MCNC: NEGATIVE MG/DL
KETONES UR STRIP.AUTO-MCNC: NEGATIVE MG/DL
L PNEUMO AG UR QL: NEGATIVE
LACTATE SERPL-SCNC: 1.1 MMOL/L (ref 0.4–2)
LACTATE SERPL-SCNC: 1.9 MMOL/L (ref 0.4–2)
LACTATE SERPL-SCNC: 2.4 MMOL/L (ref 0.4–2)
LACTATE SERPL-SCNC: 2.6 MMOL/L (ref 0.4–2)
LACTATE SERPL-SCNC: 3.1 MMOL/L (ref 0.4–2)
LYMPHOCYTES # BLD AUTO: 0.87 X10(3) UL (ref 1–4)
LYMPHOCYTES # BLD AUTO: 1.02 X10(3) UL (ref 1–4)
LYMPHOCYTES # BLD AUTO: 1.03 X10(3) UL (ref 1–4)
LYMPHOCYTES # BLD AUTO: 1.08 X10(3) UL (ref 1–4)
LYMPHOCYTES # BLD AUTO: 1.16 X10(3) UL (ref 1–4)
LYMPHOCYTES # BLD AUTO: 1.32 X10(3) UL (ref 1–4)
LYMPHOCYTES # BLD AUTO: 1.42 X10(3) UL (ref 1–4)
LYMPHOCYTES # BLD AUTO: 1.46 X10(3) UL (ref 1–4)
LYMPHOCYTES # BLD AUTO: 1.71 X10(3) UL (ref 1–4)
LYMPHOCYTES # BLD AUTO: 1.76 X10(3) UL (ref 1–4)
LYMPHOCYTES # BLD AUTO: 1.84 X10(3) UL (ref 1–4)
LYMPHOCYTES # BLD AUTO: 1.9 X10(3) UL (ref 1–4)
LYMPHOCYTES # BLD AUTO: 1.95 X10(3) UL (ref 1–4)
LYMPHOCYTES # BLD AUTO: 2.13 X10(3) UL (ref 1–4)
LYMPHOCYTES # BLD AUTO: 2.21 X10(3) UL (ref 1–4)
LYMPHOCYTES # BLD AUTO: 2.65 X10(3) UL (ref 1–4)
LYMPHOCYTES # BLD AUTO: 2.66 X10(3) UL (ref 1–4)
LYMPHOCYTES # BLD AUTO: 2.88 X10(3) UL (ref 1–4)
LYMPHOCYTES # BLD AUTO: 3.34 X10(3) UL (ref 1–4)
LYMPHOCYTES # BLD AUTO: 3.5 X10(3) UL (ref 1–4)
LYMPHOCYTES # BLD AUTO: 3.51 X10(3) UL (ref 1–4)
LYMPHOCYTES NFR BLD AUTO: 10.1 %
LYMPHOCYTES NFR BLD AUTO: 10.3 %
LYMPHOCYTES NFR BLD AUTO: 13.1 %
LYMPHOCYTES NFR BLD AUTO: 15.3 %
LYMPHOCYTES NFR BLD AUTO: 17.6 %
LYMPHOCYTES NFR BLD AUTO: 18.2 %
LYMPHOCYTES NFR BLD AUTO: 19.2 %
LYMPHOCYTES NFR BLD AUTO: 26.7 %
LYMPHOCYTES NFR BLD AUTO: 30.2 %
LYMPHOCYTES NFR BLD AUTO: 30.4 %
LYMPHOCYTES NFR BLD AUTO: 31.1 %
LYMPHOCYTES NFR BLD AUTO: 31.5 %
LYMPHOCYTES NFR BLD AUTO: 31.8 %
LYMPHOCYTES NFR BLD AUTO: 34.1 %
LYMPHOCYTES NFR BLD AUTO: 35.4 %
LYMPHOCYTES NFR BLD AUTO: 37.9 %
LYMPHOCYTES NFR BLD AUTO: 4.9 %
LYMPHOCYTES NFR BLD AUTO: 41.4 %
LYMPHOCYTES NFR BLD AUTO: 41.7 %
LYMPHOCYTES NFR BLD AUTO: 43.4 %
LYMPHOCYTES NFR BLD AUTO: 7.6 %
LYMPHOCYTES NFR BLD: 0.26 X10(3) UL (ref 1–4)
LYMPHOCYTES NFR BLD: 0.35 X10(3) UL (ref 1–4)
LYMPHOCYTES NFR BLD: 1 %
LYMPHOCYTES NFR BLD: 1 %
M PROTEIN MFR SERPL ELPH: 4.7 G/DL (ref 6.4–8.2)
M PROTEIN MFR SERPL ELPH: 4.7 G/DL (ref 6.4–8.2)
M PROTEIN MFR SERPL ELPH: 4.9 G/DL (ref 6.4–8.2)
M PROTEIN MFR SERPL ELPH: 4.9 G/DL (ref 6.4–8.2)
M PROTEIN MFR SERPL ELPH: 5.2 G/DL (ref 6.4–8.2)
M PROTEIN MFR SERPL ELPH: 5.2 G/DL (ref 6.4–8.2)
M PROTEIN MFR SERPL ELPH: 5.3 G/DL (ref 6.4–8.2)
M PROTEIN MFR SERPL ELPH: 5.3 G/DL (ref 6.4–8.2)
M PROTEIN MFR SERPL ELPH: 5.4 G/DL (ref 6.4–8.2)
M PROTEIN MFR SERPL ELPH: 5.5 G/DL (ref 6.4–8.2)
M PROTEIN MFR SERPL ELPH: 5.5 G/DL (ref 6.4–8.2)
M PROTEIN MFR SERPL ELPH: 5.6 G/DL (ref 6.4–8.2)
M PROTEIN MFR SERPL ELPH: 5.6 G/DL (ref 6.4–8.2)
M PROTEIN MFR SERPL ELPH: 5.7 G/DL (ref 6.4–8.2)
M PROTEIN MFR SERPL ELPH: 5.8 G/DL (ref 6.4–8.2)
M PROTEIN MFR SERPL ELPH: 6.1 G/DL (ref 6.4–8.2)
M PROTEIN MFR SERPL ELPH: 6.2 G/DL (ref 6.4–8.2)
M PROTEIN MFR SERPL ELPH: 6.7 G/DL (ref 6.4–8.2)
M PROTEIN MFR SERPL ELPH: 6.9 G/DL (ref 6.4–8.2)
MCH RBC QN AUTO: 27.8 PG (ref 26–34)
MCH RBC QN AUTO: 27.8 PG (ref 26–34)
MCH RBC QN AUTO: 27.9 PG (ref 26–34)
MCH RBC QN AUTO: 28.1 PG (ref 26–34)
MCH RBC QN AUTO: 28.2 PG (ref 26–34)
MCH RBC QN AUTO: 28.3 PG (ref 26–34)
MCH RBC QN AUTO: 28.3 PG (ref 26–34)
MCH RBC QN AUTO: 28.5 PG (ref 26–34)
MCH RBC QN AUTO: 28.6 PG (ref 26–34)
MCH RBC QN AUTO: 28.8 PG (ref 26–34)
MCH RBC QN AUTO: 29 PG (ref 26–34)
MCH RBC QN AUTO: 29.1 PG (ref 26–34)
MCH RBC QN AUTO: 29.4 PG (ref 26–34)
MCH RBC QN AUTO: 29.6 PG (ref 26–34)
MCHC RBC AUTO-ENTMCNC: 29.6 G/DL (ref 31–37)
MCHC RBC AUTO-ENTMCNC: 29.7 G/DL (ref 31–37)
MCHC RBC AUTO-ENTMCNC: 30.1 G/DL (ref 31–37)
MCHC RBC AUTO-ENTMCNC: 30.2 G/DL (ref 31–37)
MCHC RBC AUTO-ENTMCNC: 30.3 G/DL (ref 31–37)
MCHC RBC AUTO-ENTMCNC: 30.3 G/DL (ref 31–37)
MCHC RBC AUTO-ENTMCNC: 30.4 G/DL (ref 31–37)
MCHC RBC AUTO-ENTMCNC: 30.5 G/DL (ref 31–37)
MCHC RBC AUTO-ENTMCNC: 30.6 G/DL (ref 31–37)
MCHC RBC AUTO-ENTMCNC: 30.7 G/DL (ref 31–37)
MCHC RBC AUTO-ENTMCNC: 30.8 G/DL (ref 31–37)
MCHC RBC AUTO-ENTMCNC: 30.9 G/DL (ref 31–37)
MCHC RBC AUTO-ENTMCNC: 31 G/DL (ref 31–37)
MCHC RBC AUTO-ENTMCNC: 31 G/DL (ref 31–37)
MCHC RBC AUTO-ENTMCNC: 31.1 G/DL (ref 31–37)
MCHC RBC AUTO-ENTMCNC: 31.2 G/DL (ref 31–37)
MCHC RBC AUTO-ENTMCNC: 31.3 G/DL (ref 31–37)
MCHC RBC AUTO-ENTMCNC: 31.4 G/DL (ref 31–37)
MCHC RBC AUTO-ENTMCNC: 31.4 G/DL (ref 31–37)
MCHC RBC AUTO-ENTMCNC: 32.1 G/DL (ref 31–37)
MCHC RBC AUTO-ENTMCNC: 32.1 G/DL (ref 31–37)
MCV RBC AUTO: 90.7 FL (ref 80–100)
MCV RBC AUTO: 91.7 FL (ref 80–100)
MCV RBC AUTO: 92.3 FL (ref 80–100)
MCV RBC AUTO: 92.5 FL (ref 80–100)
MCV RBC AUTO: 92.6 FL (ref 80–100)
MCV RBC AUTO: 92.7 FL (ref 80–100)
MCV RBC AUTO: 93.1 FL (ref 80–100)
MCV RBC AUTO: 93.4 FL (ref 80–100)
MCV RBC AUTO: 93.4 FL (ref 80–100)
MCV RBC AUTO: 93.6 FL (ref 80–100)
MCV RBC AUTO: 93.6 FL (ref 80–100)
MCV RBC AUTO: 93.7 FL (ref 80–100)
MCV RBC AUTO: 93.7 FL (ref 80–100)
MCV RBC AUTO: 93.8 FL (ref 80–100)
MCV RBC AUTO: 93.9 FL (ref 80–100)
MCV RBC AUTO: 93.9 FL (ref 80–100)
MCV RBC AUTO: 94.1 FL (ref 80–100)
MCV RBC AUTO: 94.5 FL (ref 80–100)
MCV RBC AUTO: 95.1 FL (ref 80–100)
MCV RBC AUTO: 95.7 FL (ref 80–100)
MONOCYTES # BLD AUTO: 0.39 X10(3) UL (ref 0.1–1)
MONOCYTES # BLD AUTO: 0.41 X10(3) UL (ref 0.1–1)
MONOCYTES # BLD AUTO: 0.43 X10(3) UL (ref 0.1–1)
MONOCYTES # BLD AUTO: 0.43 X10(3) UL (ref 0.1–1)
MONOCYTES # BLD AUTO: 0.46 X10(3) UL (ref 0.1–1)
MONOCYTES # BLD AUTO: 0.46 X10(3) UL (ref 0.1–1)
MONOCYTES # BLD AUTO: 0.49 X10(3) UL (ref 0.1–1)
MONOCYTES # BLD AUTO: 0.49 X10(3) UL (ref 0.1–1)
MONOCYTES # BLD AUTO: 0.5 X10(3) UL (ref 0.1–1)
MONOCYTES # BLD AUTO: 0.51 X10(3) UL (ref 0.1–1)
MONOCYTES # BLD AUTO: 0.51 X10(3) UL (ref 0.1–1)
MONOCYTES # BLD AUTO: 0.56 X10(3) UL (ref 0.1–1)
MONOCYTES # BLD AUTO: 0.56 X10(3) UL (ref 0.1–1)
MONOCYTES # BLD AUTO: 0.57 X10(3) UL (ref 0.1–1)
MONOCYTES # BLD AUTO: 0.58 X10(3) UL (ref 0.1–1)
MONOCYTES # BLD AUTO: 0.66 X10(3) UL (ref 0.1–1)
MONOCYTES # BLD AUTO: 0.73 X10(3) UL (ref 0.1–1)
MONOCYTES # BLD: 0.26 X10(3) UL (ref 0.1–1)
MONOCYTES # BLD: 0.7 X10(3) UL (ref 0.1–1)
MONOCYTES NFR BLD AUTO: 2.4 %
MONOCYTES NFR BLD AUTO: 2.9 %
MONOCYTES NFR BLD AUTO: 3.5 %
MONOCYTES NFR BLD AUTO: 4.6 %
MONOCYTES NFR BLD AUTO: 5.6 %
MONOCYTES NFR BLD AUTO: 5.8 %
MONOCYTES NFR BLD AUTO: 5.8 %
MONOCYTES NFR BLD AUTO: 5.9 %
MONOCYTES NFR BLD AUTO: 6 %
MONOCYTES NFR BLD AUTO: 6.6 %
MONOCYTES NFR BLD AUTO: 6.6 %
MONOCYTES NFR BLD AUTO: 6.8 %
MONOCYTES NFR BLD AUTO: 7.8 %
MONOCYTES NFR BLD AUTO: 7.9 %
MONOCYTES NFR BLD AUTO: 7.9 %
MONOCYTES NFR BLD AUTO: 8 %
MONOCYTES NFR BLD AUTO: 8.6 %
MONOCYTES NFR BLD AUTO: 8.6 %
MONOCYTES NFR BLD AUTO: 9.3 %
MONOCYTES NFR BLD AUTO: 9.4 %
MONOCYTES NFR BLD AUTO: 9.5 %
MONOCYTES NFR BLD: 1 %
MONOCYTES NFR BLD: 2 %
MORPHOLOGY: NORMAL
MORPHOLOGY: NORMAL
NEUTROPHILS # BLD AUTO: 11.63 X10 (3) UL (ref 1.5–7.7)
NEUTROPHILS # BLD AUTO: 11.63 X10(3) UL (ref 1.5–7.7)
NEUTROPHILS # BLD AUTO: 15.24 X10 (3) UL (ref 1.5–7.7)
NEUTROPHILS # BLD AUTO: 15.24 X10(3) UL (ref 1.5–7.7)
NEUTROPHILS # BLD AUTO: 19.11 X10 (3) UL (ref 1.5–7.7)
NEUTROPHILS # BLD AUTO: 19.11 X10(3) UL (ref 1.5–7.7)
NEUTROPHILS # BLD AUTO: 2.64 X10 (3) UL (ref 1.5–7.7)
NEUTROPHILS # BLD AUTO: 2.64 X10(3) UL (ref 1.5–7.7)
NEUTROPHILS # BLD AUTO: 2.8 X10 (3) UL (ref 1.5–7.7)
NEUTROPHILS # BLD AUTO: 2.8 X10(3) UL (ref 1.5–7.7)
NEUTROPHILS # BLD AUTO: 23.55 X10 (3) UL (ref 1.5–7.7)
NEUTROPHILS # BLD AUTO: 3.03 X10 (3) UL (ref 1.5–7.7)
NEUTROPHILS # BLD AUTO: 3.03 X10(3) UL (ref 1.5–7.7)
NEUTROPHILS # BLD AUTO: 3.24 X10 (3) UL (ref 1.5–7.7)
NEUTROPHILS # BLD AUTO: 3.24 X10(3) UL (ref 1.5–7.7)
NEUTROPHILS # BLD AUTO: 3.38 X10 (3) UL (ref 1.5–7.7)
NEUTROPHILS # BLD AUTO: 3.38 X10(3) UL (ref 1.5–7.7)
NEUTROPHILS # BLD AUTO: 3.83 X10 (3) UL (ref 1.5–7.7)
NEUTROPHILS # BLD AUTO: 3.83 X10(3) UL (ref 1.5–7.7)
NEUTROPHILS # BLD AUTO: 3.9 X10 (3) UL (ref 1.5–7.7)
NEUTROPHILS # BLD AUTO: 3.9 X10(3) UL (ref 1.5–7.7)
NEUTROPHILS # BLD AUTO: 32.45 X10 (3) UL (ref 1.5–7.7)
NEUTROPHILS # BLD AUTO: 4.04 X10 (3) UL (ref 1.5–7.7)
NEUTROPHILS # BLD AUTO: 4.04 X10(3) UL (ref 1.5–7.7)
NEUTROPHILS # BLD AUTO: 4.36 X10 (3) UL (ref 1.5–7.7)
NEUTROPHILS # BLD AUTO: 4.36 X10(3) UL (ref 1.5–7.7)
NEUTROPHILS # BLD AUTO: 4.66 X10 (3) UL (ref 1.5–7.7)
NEUTROPHILS # BLD AUTO: 4.66 X10(3) UL (ref 1.5–7.7)
NEUTROPHILS # BLD AUTO: 4.68 X10 (3) UL (ref 1.5–7.7)
NEUTROPHILS # BLD AUTO: 4.68 X10(3) UL (ref 1.5–7.7)
NEUTROPHILS # BLD AUTO: 5.01 X10 (3) UL (ref 1.5–7.7)
NEUTROPHILS # BLD AUTO: 5.01 X10(3) UL (ref 1.5–7.7)
NEUTROPHILS # BLD AUTO: 5.37 X10 (3) UL (ref 1.5–7.7)
NEUTROPHILS # BLD AUTO: 5.37 X10(3) UL (ref 1.5–7.7)
NEUTROPHILS # BLD AUTO: 5.49 X10 (3) UL (ref 1.5–7.7)
NEUTROPHILS # BLD AUTO: 5.49 X10(3) UL (ref 1.5–7.7)
NEUTROPHILS # BLD AUTO: 5.55 X10 (3) UL (ref 1.5–7.7)
NEUTROPHILS # BLD AUTO: 5.55 X10(3) UL (ref 1.5–7.7)
NEUTROPHILS # BLD AUTO: 6.19 X10 (3) UL (ref 1.5–7.7)
NEUTROPHILS # BLD AUTO: 6.19 X10(3) UL (ref 1.5–7.7)
NEUTROPHILS # BLD AUTO: 6.86 X10 (3) UL (ref 1.5–7.7)
NEUTROPHILS # BLD AUTO: 6.86 X10(3) UL (ref 1.5–7.7)
NEUTROPHILS # BLD AUTO: 7.27 X10 (3) UL (ref 1.5–7.7)
NEUTROPHILS # BLD AUTO: 7.27 X10(3) UL (ref 1.5–7.7)
NEUTROPHILS NFR BLD AUTO: 42 %
NEUTROPHILS NFR BLD AUTO: 45.3 %
NEUTROPHILS NFR BLD AUTO: 46.5 %
NEUTROPHILS NFR BLD AUTO: 49.2 %
NEUTROPHILS NFR BLD AUTO: 51.8 %
NEUTROPHILS NFR BLD AUTO: 52.9 %
NEUTROPHILS NFR BLD AUTO: 54.7 %
NEUTROPHILS NFR BLD AUTO: 55.9 %
NEUTROPHILS NFR BLD AUTO: 57 %
NEUTROPHILS NFR BLD AUTO: 59.3 %
NEUTROPHILS NFR BLD AUTO: 59.9 %
NEUTROPHILS NFR BLD AUTO: 62.6 %
NEUTROPHILS NFR BLD AUTO: 72.9 %
NEUTROPHILS NFR BLD AUTO: 73.4 %
NEUTROPHILS NFR BLD AUTO: 75 %
NEUTROPHILS NFR BLD AUTO: 76.2 %
NEUTROPHILS NFR BLD AUTO: 79.3 %
NEUTROPHILS NFR BLD AUTO: 81.3 %
NEUTROPHILS NFR BLD AUTO: 82.3 %
NEUTROPHILS NFR BLD AUTO: 87.4 %
NEUTROPHILS NFR BLD AUTO: 90.2 %
NEUTROPHILS NFR BLD: 89 %
NEUTROPHILS NFR BLD: 94 %
NEUTS BAND NFR BLD: 3 %
NEUTS BAND NFR BLD: 9 %
NEUTS HYPERSEG # BLD: 25.38 X10(3) UL (ref 1.5–7.7)
NEUTS HYPERSEG # BLD: 34.05 X10(3) UL (ref 1.5–7.7)
NITRITE UR QL STRIP.AUTO: NEGATIVE
NITRITE UR QL STRIP.AUTO: NEGATIVE
OSMOLALITY SERPL CALC.SUM OF ELEC: 291 MOSM/KG (ref 275–295)
OSMOLALITY SERPL CALC.SUM OF ELEC: 292 MOSM/KG (ref 275–295)
OSMOLALITY SERPL CALC.SUM OF ELEC: 292 MOSM/KG (ref 275–295)
OSMOLALITY SERPL CALC.SUM OF ELEC: 293 MOSM/KG (ref 275–295)
OSMOLALITY SERPL CALC.SUM OF ELEC: 293 MOSM/KG (ref 275–295)
OSMOLALITY SERPL CALC.SUM OF ELEC: 294 MOSM/KG (ref 275–295)
OSMOLALITY SERPL CALC.SUM OF ELEC: 295 MOSM/KG (ref 275–295)
OSMOLALITY SERPL CALC.SUM OF ELEC: 301 MOSM/KG (ref 275–295)
OSMOLALITY SERPL CALC.SUM OF ELEC: 305 MOSM/KG (ref 275–295)
OSMOLALITY SERPL CALC.SUM OF ELEC: 306 MOSM/KG (ref 275–295)
OSMOLALITY SERPL CALC.SUM OF ELEC: 307 MOSM/KG (ref 275–295)
OSMOLALITY SERPL CALC.SUM OF ELEC: 312 MOSM/KG (ref 275–295)
OSMOLALITY SERPL CALC.SUM OF ELEC: 322 MOSM/KG (ref 275–295)
OSMOLALITY SERPL CALC.SUM OF ELEC: 327 MOSM/KG (ref 275–295)
OSMOLALITY SERPL CALC.SUM OF ELEC: 333 MOSM/KG (ref 275–295)
OSMOLALITY SERPL CALC.SUM OF ELEC: 338 MOSM/KG (ref 275–295)
OSMOLALITY SERPL CALC.SUM OF ELEC: 355 MOSM/KG (ref 275–295)
OSMOLALITY SERPL CALC.SUM OF ELEC: 360 MOSM/KG (ref 275–295)
P AXIS: 15 DEGREES
P-R INTERVAL: 172 MS
PH UR STRIP.AUTO: 5 [PH] (ref 4.5–8)
PH UR STRIP.AUTO: 6 [PH] (ref 4.5–8)
PHOSPHATE SERPL-MCNC: 3.7 MG/DL (ref 2.5–4.9)
PHOSPHATE SERPL-MCNC: 4.1 MG/DL (ref 2.5–4.9)
PHOSPHATE SERPL-MCNC: 4.2 MG/DL (ref 2.5–4.9)
PLATELET # BLD AUTO: 102 10(3)UL (ref 150–450)
PLATELET # BLD AUTO: 103 10(3)UL (ref 150–450)
PLATELET # BLD AUTO: 106 10(3)UL (ref 150–450)
PLATELET # BLD AUTO: 107 10(3)UL (ref 150–450)
PLATELET # BLD AUTO: 111 10(3)UL (ref 150–450)
PLATELET # BLD AUTO: 112 10(3)UL (ref 150–450)
PLATELET # BLD AUTO: 114 10(3)UL (ref 150–450)
PLATELET # BLD AUTO: 117 10(3)UL (ref 150–450)
PLATELET # BLD AUTO: 118 10(3)UL (ref 150–450)
PLATELET # BLD AUTO: 122 10(3)UL (ref 150–450)
PLATELET # BLD AUTO: 125 10(3)UL (ref 150–450)
PLATELET # BLD AUTO: 129 10(3)UL (ref 150–450)
PLATELET # BLD AUTO: 130 10(3)UL (ref 150–450)
PLATELET # BLD AUTO: 134 10(3)UL (ref 150–450)
PLATELET # BLD AUTO: 137 10(3)UL (ref 150–450)
PLATELET # BLD AUTO: 142 10(3)UL (ref 150–450)
PLATELET # BLD AUTO: 149 10(3)UL (ref 150–450)
PLATELET # BLD AUTO: 159 10(3)UL (ref 150–450)
PLATELET # BLD AUTO: 166 10(3)UL (ref 150–450)
PLATELET # BLD AUTO: 185 10(3)UL (ref 150–450)
PLATELET # BLD AUTO: 261 10(3)UL (ref 150–450)
PLATELET # BLD AUTO: 289 10(3)UL (ref 150–450)
PLATELET # BLD AUTO: 93 10(3)UL (ref 150–450)
PLATELET MORPHOLOGY: NORMAL
PLATELET MORPHOLOGY: NORMAL
POTASSIUM SERPL-SCNC: 3 MMOL/L (ref 3.5–5.1)
POTASSIUM SERPL-SCNC: 3.3 MMOL/L (ref 3.5–5.1)
POTASSIUM SERPL-SCNC: 3.6 MMOL/L (ref 3.5–5.1)
POTASSIUM SERPL-SCNC: 3.8 MMOL/L (ref 3.5–5.1)
POTASSIUM SERPL-SCNC: 3.8 MMOL/L (ref 3.5–5.1)
POTASSIUM SERPL-SCNC: 3.9 MMOL/L (ref 3.5–5.1)
POTASSIUM SERPL-SCNC: 3.9 MMOL/L (ref 3.5–5.1)
POTASSIUM SERPL-SCNC: 4 MMOL/L (ref 3.5–5.1)
POTASSIUM SERPL-SCNC: 4.1 MMOL/L (ref 3.5–5.1)
POTASSIUM SERPL-SCNC: 4.2 MMOL/L (ref 3.5–5.1)
POTASSIUM SERPL-SCNC: 4.4 MMOL/L (ref 3.5–5.1)
POTASSIUM SERPL-SCNC: 4.6 MMOL/L (ref 3.5–5.1)
PROCALCITONIN SERPL-MCNC: 0.1 NG/ML (ref ?–0.16)
PROCALCITONIN SERPL-MCNC: 2.92 NG/ML (ref ?–0.16)
PROT UR STRIP.AUTO-MCNC: 30 MG/DL
PROT UR STRIP.AUTO-MCNC: NEGATIVE MG/DL
PSA SERPL DL<=0.01 NG/ML-MCNC: 26.6 SECONDS (ref 12.4–14.6)
PSA SERPL DL<=0.01 NG/ML-MCNC: 28.1 SECONDS (ref 12.4–14.6)
Q-T INTERVAL: 362 MS
Q-T INTERVAL: 438 MS
QRS DURATION: 140 MS
QRS DURATION: 154 MS
QTC CALCULATION (BEZET): 469 MS
QTC CALCULATION (BEZET): 562 MS
R AXIS: -36 DEGREES
R AXIS: -44 DEGREES
RBC # BLD AUTO: 2.3 X10(6)UL (ref 3.8–5.3)
RBC # BLD AUTO: 2.31 X10(6)UL (ref 3.8–5.3)
RBC # BLD AUTO: 2.43 X10(6)UL (ref 3.8–5.3)
RBC # BLD AUTO: 2.58 X10(6)UL (ref 3.8–5.3)
RBC # BLD AUTO: 2.59 X10(6)UL (ref 3.8–5.3)
RBC # BLD AUTO: 2.66 X10(6)UL (ref 3.8–5.3)
RBC # BLD AUTO: 2.79 X10(6)UL (ref 3.8–5.3)
RBC # BLD AUTO: 2.8 X10(6)UL (ref 3.8–5.3)
RBC # BLD AUTO: 2.8 X10(6)UL (ref 3.8–5.3)
RBC # BLD AUTO: 2.81 X10(6)UL (ref 3.8–5.3)
RBC # BLD AUTO: 2.86 X10(6)UL (ref 3.8–5.3)
RBC # BLD AUTO: 2.87 X10(6)UL (ref 3.8–5.3)
RBC # BLD AUTO: 2.88 X10(6)UL (ref 3.8–5.3)
RBC # BLD AUTO: 2.9 X10(6)UL (ref 3.8–5.3)
RBC # BLD AUTO: 2.92 X10(6)UL (ref 3.8–5.3)
RBC # BLD AUTO: 2.98 X10(6)UL (ref 3.8–5.3)
RBC # BLD AUTO: 2.99 X10(6)UL (ref 3.8–5.3)
RBC # BLD AUTO: 3.02 X10(6)UL (ref 3.8–5.3)
RBC # BLD AUTO: 3.17 X10(6)UL (ref 3.8–5.3)
RBC # BLD AUTO: 3.2 X10(6)UL (ref 3.8–5.3)
RBC # BLD AUTO: 3.23 X10(6)UL (ref 3.8–5.3)
RBC # BLD AUTO: 3.26 X10(6)UL (ref 3.8–5.3)
RBC # BLD AUTO: 3.98 X10(6)UL (ref 3.8–5.3)
RBC #/AREA URNS AUTO: >10 /HPF
RH BLOOD TYPE: POSITIVE
RH BLOOD TYPE: POSITIVE
SARS-COV-2 RNA RESP QL NAA+PROBE: NOT DETECTED
SODIUM SERPL-SCNC: 138 MMOL/L (ref 136–145)
SODIUM SERPL-SCNC: 139 MMOL/L (ref 136–145)
SODIUM SERPL-SCNC: 140 MMOL/L (ref 136–145)
SODIUM SERPL-SCNC: 141 MMOL/L (ref 136–145)
SODIUM SERPL-SCNC: 141 MMOL/L (ref 136–145)
SODIUM SERPL-SCNC: 144 MMOL/L (ref 136–145)
SODIUM SERPL-SCNC: 146 MMOL/L (ref 136–145)
SODIUM SERPL-SCNC: 146 MMOL/L (ref 136–145)
SODIUM SERPL-SCNC: 147 MMOL/L (ref 136–145)
SODIUM SERPL-SCNC: 147 MMOL/L (ref 136–145)
SODIUM SERPL-SCNC: 148 MMOL/L (ref 136–145)
SODIUM SERPL-SCNC: 148 MMOL/L (ref 136–145)
SODIUM SERPL-SCNC: 149 MMOL/L (ref 136–145)
SODIUM SERPL-SCNC: 149 MMOL/L (ref 136–145)
SP GR UR STRIP.AUTO: 1.01 (ref 1–1.03)
SP GR UR STRIP.AUTO: 1.01 (ref 1–1.03)
STAPHYLOCOCCUS AUREUS, MRSA BY PCR: DETECTED
STREP PNEUMO ANTIGEN, URINE: NEGATIVE
T AXIS: -13 DEGREES
T AXIS: -24 DEGREES
TOTAL CELLS COUNTED: 100
TOTAL CELLS COUNTED: 100
TOTAL IRON BINDING CAPACITY: 183 UG/DL (ref 240–450)
TRANSFERRIN SERPL-MCNC: 123 MG/DL (ref 200–360)
UROBILINOGEN UR STRIP.AUTO-MCNC: <2 MG/DL
UROBILINOGEN UR STRIP.AUTO-MCNC: <2 MG/DL
VANCOMYCIN SERPL-MCNC: 16.2 UG/ML
VANCOMYCIN SERPL-MCNC: 20.9 UG/ML
VANCOMYCIN TROUGH SERPL-MCNC: 18.5 UG/ML (ref 10–20)
VANCOMYCIN TROUGH SERPL-MCNC: 18.7 UG/ML (ref 10–20)
VANCOMYCIN TROUGH SERPL-MCNC: 20.4 UG/ML (ref 10–20)
VANCOMYCIN TROUGH SERPL-MCNC: 21 UG/ML (ref 10–20)
VENTRICULAR RATE: 145 BPM
VENTRICULAR RATE: 69 BPM
VIT B12 SERPL-MCNC: 870 PG/ML (ref 193–986)
WBC # BLD AUTO: 14.1 X10(3) UL (ref 4–11)
WBC # BLD AUTO: 17.4 X10(3) UL (ref 4–11)
WBC # BLD AUTO: 21.2 X10(3) UL (ref 4–11)
WBC # BLD AUTO: 25.9 X10(3) UL (ref 4–11)
WBC # BLD AUTO: 35.1 X10(3) UL (ref 4–11)
WBC # BLD AUTO: 5.4 X10(3) UL (ref 4–11)
WBC # BLD AUTO: 5.5 X10(3) UL (ref 4–11)
WBC # BLD AUTO: 6.1 X10(3) UL (ref 4–11)
WBC # BLD AUTO: 6.3 X10(3) UL (ref 4–11)
WBC # BLD AUTO: 6.4 X10(3) UL (ref 4–11)
WBC # BLD AUTO: 6.6 X10(3) UL (ref 4–11)
WBC # BLD AUTO: 7.1 X10(3) UL (ref 4–11)
WBC # BLD AUTO: 7.1 X10(3) UL (ref 4–11)
WBC # BLD AUTO: 7.3 X10(3) UL (ref 4–11)
WBC # BLD AUTO: 7.6 X10(3) UL (ref 4–11)
WBC # BLD AUTO: 7.8 X10(3) UL (ref 4–11)
WBC # BLD AUTO: 8.4 X10(3) UL (ref 4–11)
WBC # BLD AUTO: 8.4 X10(3) UL (ref 4–11)
WBC # BLD AUTO: 8.5 X10(3) UL (ref 4–11)
WBC # BLD AUTO: 8.5 X10(3) UL (ref 4–11)
WBC # BLD AUTO: 8.8 X10(3) UL (ref 4–11)
WBC # BLD AUTO: 8.8 X10(3) UL (ref 4–11)
WBC # BLD AUTO: 9.7 X10(3) UL (ref 4–11)
WBC #/AREA URNS AUTO: >50 /HPF
WBC #/AREA URNS AUTO: >50 /HPF
WBC CLUMPS UR QL AUTO: PRESENT
YEAST URINE: PRESENT

## 2020-01-01 PROCEDURE — 70490 CT SOFT TISSUE NECK W/O DYE: CPT | Performed by: PHYSICIAN ASSISTANT

## 2020-01-01 PROCEDURE — 85025 COMPLETE CBC W/AUTO DIFF WBC: CPT | Performed by: EMERGENCY MEDICINE

## 2020-01-01 PROCEDURE — 05H533Z INSERTION OF INFUSION DEVICE INTO RIGHT SUBCLAVIAN VEIN, PERCUTANEOUS APPROACH: ICD-10-PCS | Performed by: HOSPITALIST

## 2020-01-01 PROCEDURE — 99223 1ST HOSP IP/OBS HIGH 75: CPT | Performed by: INTERNAL MEDICINE

## 2020-01-01 PROCEDURE — 99291 CRITICAL CARE FIRST HOUR: CPT | Performed by: NURSE PRACTITIONER

## 2020-01-01 PROCEDURE — 99232 SBSQ HOSP IP/OBS MODERATE 35: CPT | Performed by: HOSPITALIST

## 2020-01-01 PROCEDURE — 93306 TTE W/DOPPLER COMPLETE: CPT | Performed by: INTERNAL MEDICINE

## 2020-01-01 PROCEDURE — 99284 EMERGENCY DEPT VISIT MOD MDM: CPT

## 2020-01-01 PROCEDURE — 81001 URINALYSIS AUTO W/SCOPE: CPT | Performed by: EMERGENCY MEDICINE

## 2020-01-01 PROCEDURE — 99232 SBSQ HOSP IP/OBS MODERATE 35: CPT | Performed by: INTERNAL MEDICINE

## 2020-01-01 PROCEDURE — 74176 CT ABD & PELVIS W/O CONTRAST: CPT | Performed by: EMERGENCY MEDICINE

## 2020-01-01 PROCEDURE — 99231 SBSQ HOSP IP/OBS SF/LOW 25: CPT | Performed by: NURSE PRACTITIONER

## 2020-01-01 PROCEDURE — 93306 TTE W/DOPPLER COMPLETE: CPT | Performed by: HOSPITALIST

## 2020-01-01 PROCEDURE — 0JB70ZZ EXCISION OF BACK SUBCUTANEOUS TISSUE AND FASCIA, OPEN APPROACH: ICD-10-PCS | Performed by: SURGERY

## 2020-01-01 PROCEDURE — 80053 COMPREHEN METABOLIC PANEL: CPT | Performed by: EMERGENCY MEDICINE

## 2020-01-01 PROCEDURE — 93970 EXTREMITY STUDY: CPT | Performed by: INTERNAL MEDICINE

## 2020-01-01 PROCEDURE — B546ZZA ULTRASONOGRAPHY OF RIGHT SUBCLAVIAN VEIN, GUIDANCE: ICD-10-PCS | Performed by: HOSPITALIST

## 2020-01-01 PROCEDURE — 96361 HYDRATE IV INFUSION ADD-ON: CPT

## 2020-01-01 PROCEDURE — 99222 1ST HOSP IP/OBS MODERATE 55: CPT | Performed by: NURSE PRACTITIONER

## 2020-01-01 PROCEDURE — 06HY33Z INSERTION OF INFUSION DEVICE INTO LOWER VEIN, PERCUTANEOUS APPROACH: ICD-10-PCS | Performed by: HOSPITALIST

## 2020-01-01 PROCEDURE — 70450 CT HEAD/BRAIN W/O DYE: CPT | Performed by: EMERGENCY MEDICINE

## 2020-01-01 PROCEDURE — 72125 CT NECK SPINE W/O DYE: CPT | Performed by: EMERGENCY MEDICINE

## 2020-01-01 PROCEDURE — 71045 X-RAY EXAM CHEST 1 VIEW: CPT | Performed by: EMERGENCY MEDICINE

## 2020-01-01 PROCEDURE — 71250 CT THORAX DX C-: CPT | Performed by: PHYSICIAN ASSISTANT

## 2020-01-01 PROCEDURE — 76770 US EXAM ABDO BACK WALL COMP: CPT | Performed by: HOSPITALIST

## 2020-01-01 PROCEDURE — 99223 1ST HOSP IP/OBS HIGH 75: CPT | Performed by: SURGERY

## 2020-01-01 PROCEDURE — 93005 ELECTROCARDIOGRAM TRACING: CPT

## 2020-01-01 PROCEDURE — B5181ZA FLUOROSCOPY OF SUPERIOR VENA CAVA USING LOW OSMOLAR CONTRAST, GUIDANCE: ICD-10-PCS | Performed by: RADIOLOGY

## 2020-01-01 PROCEDURE — 93971 EXTREMITY STUDY: CPT | Performed by: HOSPITALIST

## 2020-01-01 PROCEDURE — 71045 X-RAY EXAM CHEST 1 VIEW: CPT | Performed by: NURSE PRACTITIONER

## 2020-01-01 PROCEDURE — 36415 COLL VENOUS BLD VENIPUNCTURE: CPT

## 2020-01-01 PROCEDURE — 5A09357 ASSISTANCE WITH RESPIRATORY VENTILATION, LESS THAN 24 CONSECUTIVE HOURS, CONTINUOUS POSITIVE AIRWAY PRESSURE: ICD-10-PCS | Performed by: INTERNAL MEDICINE

## 2020-01-01 PROCEDURE — 99291 CRITICAL CARE FIRST HOUR: CPT | Performed by: HOSPITALIST

## 2020-01-01 PROCEDURE — 87086 URINE CULTURE/COLONY COUNT: CPT | Performed by: EMERGENCY MEDICINE

## 2020-01-01 PROCEDURE — 96365 THER/PROPH/DIAG IV INF INIT: CPT

## 2020-01-01 PROCEDURE — 06HY33Z INSERTION OF INFUSION DEVICE INTO LOWER VEIN, PERCUTANEOUS APPROACH: ICD-10-PCS | Performed by: EMERGENCY MEDICINE

## 2020-01-01 PROCEDURE — 99239 HOSP IP/OBS DSCHRG MGMT >30: CPT | Performed by: HOSPITALIST

## 2020-01-01 PROCEDURE — 99233 SBSQ HOSP IP/OBS HIGH 50: CPT | Performed by: HOSPITALIST

## 2020-01-01 PROCEDURE — 99233 SBSQ HOSP IP/OBS HIGH 50: CPT | Performed by: INTERNAL MEDICINE

## 2020-01-01 PROCEDURE — 70490 CT SOFT TISSUE NECK W/O DYE: CPT | Performed by: INTERNAL MEDICINE

## 2020-01-01 PROCEDURE — 74176 CT ABD & PELVIS W/O CONTRAST: CPT | Performed by: PHYSICIAN ASSISTANT

## 2020-01-01 PROCEDURE — 71045 X-RAY EXAM CHEST 1 VIEW: CPT | Performed by: INTERNAL MEDICINE

## 2020-01-01 PROCEDURE — 87040 BLOOD CULTURE FOR BACTERIA: CPT | Performed by: EMERGENCY MEDICINE

## 2020-01-01 PROCEDURE — 99285 EMERGENCY DEPT VISIT HI MDM: CPT

## 2020-01-01 PROCEDURE — 99231 SBSQ HOSP IP/OBS SF/LOW 25: CPT | Performed by: SURGERY

## 2020-01-01 PROCEDURE — 74230 X-RAY XM SWLNG FUNCJ C+: CPT | Performed by: HOSPITALIST

## 2020-01-01 PROCEDURE — 84145 PROCALCITONIN (PCT): CPT | Performed by: EMERGENCY MEDICINE

## 2020-01-01 PROCEDURE — 83605 ASSAY OF LACTIC ACID: CPT | Performed by: EMERGENCY MEDICINE

## 2020-01-01 PROCEDURE — 30233N1 TRANSFUSION OF NONAUTOLOGOUS RED BLOOD CELLS INTO PERIPHERAL VEIN, PERCUTANEOUS APPROACH: ICD-10-PCS | Performed by: INTERNAL MEDICINE

## 2020-01-01 PROCEDURE — 73030 X-RAY EXAM OF SHOULDER: CPT | Performed by: EMERGENCY MEDICINE

## 2020-01-01 PROCEDURE — 74176 CT ABD & PELVIS W/O CONTRAST: CPT | Performed by: INTERNAL MEDICINE

## 2020-01-01 PROCEDURE — 11044 DBRDMT BONE 1ST 20 SQ CM/<: CPT | Performed by: SURGERY

## 2020-01-01 PROCEDURE — 02HV33Z INSERTION OF INFUSION DEVICE INTO SUPERIOR VENA CAVA, PERCUTANEOUS APPROACH: ICD-10-PCS | Performed by: RADIOLOGY

## 2020-01-01 RX ORDER — SODIUM CHLORIDE, SODIUM LACTATE, POTASSIUM CHLORIDE, CALCIUM CHLORIDE 600; 310; 30; 20 MG/100ML; MG/100ML; MG/100ML; MG/100ML
INJECTION, SOLUTION INTRAVENOUS CONTINUOUS
Status: DISCONTINUED | OUTPATIENT
Start: 2020-01-01 | End: 2020-01-01 | Stop reason: HOSPADM

## 2020-01-01 RX ORDER — POTASSIUM CHLORIDE 29.8 MG/ML
40 INJECTION INTRAVENOUS ONCE
Status: COMPLETED | OUTPATIENT
Start: 2020-01-01 | End: 2020-01-01

## 2020-01-01 RX ORDER — ACETAMINOPHEN 500 MG
500 TABLET ORAL NIGHTLY
Status: DISCONTINUED | OUTPATIENT
Start: 2020-01-01 | End: 2020-01-01

## 2020-01-01 RX ORDER — CALCIUM CARBONATE/VITAMIN D3 250-3.125
1 TABLET ORAL DAILY
Status: DISCONTINUED | OUTPATIENT
Start: 2020-01-01 | End: 2020-01-01

## 2020-01-01 RX ORDER — ALBUMIN, HUMAN INJ 5% 5 %
250 SOLUTION INTRAVENOUS ONCE
Status: COMPLETED | OUTPATIENT
Start: 2020-01-01 | End: 2020-01-01

## 2020-01-01 RX ORDER — ACETAMINOPHEN 325 MG/1
650 TABLET ORAL EVERY 6 HOURS PRN
Status: DISCONTINUED | OUTPATIENT
Start: 2020-01-01 | End: 2020-01-01

## 2020-01-01 RX ORDER — IPRATROPIUM BROMIDE AND ALBUTEROL SULFATE 2.5; .5 MG/3ML; MG/3ML
3 SOLUTION RESPIRATORY (INHALATION) EVERY 4 HOURS PRN
COMMUNITY
End: 2020-01-01

## 2020-01-01 RX ORDER — ASPIRIN 300 MG
300 SUPPOSITORY, RECTAL RECTAL ONCE
Status: COMPLETED | OUTPATIENT
Start: 2020-01-01 | End: 2020-01-01

## 2020-01-01 RX ORDER — NALOXONE HYDROCHLORIDE 0.4 MG/ML
80 INJECTION, SOLUTION INTRAMUSCULAR; INTRAVENOUS; SUBCUTANEOUS AS NEEDED
Status: DISCONTINUED | OUTPATIENT
Start: 2020-01-01 | End: 2020-01-01 | Stop reason: HOSPADM

## 2020-01-01 RX ORDER — DOCUSATE SODIUM 100 MG/1
100 CAPSULE, LIQUID FILLED ORAL 2 TIMES DAILY
Status: DISCONTINUED | OUTPATIENT
Start: 2020-01-01 | End: 2020-01-01

## 2020-01-01 RX ORDER — DEXTROSE AND SODIUM CHLORIDE 5; .45 G/100ML; G/100ML
INJECTION, SOLUTION INTRAVENOUS CONTINUOUS
Status: DISCONTINUED | OUTPATIENT
Start: 2020-01-01 | End: 2020-01-01

## 2020-01-01 RX ORDER — ENOXAPARIN SODIUM 100 MG/ML
30 INJECTION SUBCUTANEOUS DAILY
Status: DISCONTINUED | OUTPATIENT
Start: 2020-01-01 | End: 2020-01-01

## 2020-01-01 RX ORDER — FOLIC ACID 1 MG/1
1 TABLET ORAL DAILY
Status: DISCONTINUED | OUTPATIENT
Start: 2020-01-01 | End: 2020-01-01

## 2020-01-01 RX ORDER — LEVOFLOXACIN 5 MG/ML
750 INJECTION, SOLUTION INTRAVENOUS ONCE
Status: COMPLETED | OUTPATIENT
Start: 2020-01-01 | End: 2020-01-01

## 2020-01-01 RX ORDER — VANCOMYCIN HYDROCHLORIDE
25 ONCE
Status: COMPLETED | OUTPATIENT
Start: 2020-01-01 | End: 2020-01-01

## 2020-01-01 RX ORDER — MORPHINE SULFATE 2 MG/ML
2 INJECTION, SOLUTION INTRAMUSCULAR; INTRAVENOUS ONCE
Status: DISCONTINUED | OUTPATIENT
Start: 2020-01-01 | End: 2020-01-01

## 2020-01-01 RX ORDER — DONEPEZIL HYDROCHLORIDE 10 MG/1
10 TABLET, FILM COATED ORAL NIGHTLY
Status: DISCONTINUED | OUTPATIENT
Start: 2020-01-01 | End: 2020-01-01

## 2020-01-01 RX ORDER — POTASSIUM CHLORIDE 14.9 MG/ML
20 INJECTION INTRAVENOUS ONCE
Status: COMPLETED | OUTPATIENT
Start: 2020-01-01 | End: 2020-01-01

## 2020-01-01 RX ORDER — FAMOTIDINE 20 MG/1
20 TABLET ORAL DAILY
Status: DISCONTINUED | OUTPATIENT
Start: 2020-01-01 | End: 2020-01-01

## 2020-01-01 RX ORDER — SODIUM CHLORIDE 9 MG/ML
INJECTION, SOLUTION INTRAVENOUS CONTINUOUS
Status: DISCONTINUED | OUTPATIENT
Start: 2020-01-01 | End: 2020-01-01

## 2020-01-01 RX ORDER — DEXTROSE MONOHYDRATE AND SODIUM CHLORIDE 5; .45 G/100ML; G/100ML
83 INJECTION, SOLUTION INTRAVENOUS DAILY
Status: ON HOLD | COMMUNITY
End: 2020-01-01

## 2020-01-01 RX ORDER — MIDODRINE HYDROCHLORIDE 5 MG/1
5 TABLET ORAL 3 TIMES DAILY
Status: DISCONTINUED | OUTPATIENT
Start: 2020-01-01 | End: 2020-01-01

## 2020-01-01 RX ORDER — DILTIAZEM HYDROCHLORIDE 5 MG/ML
10 INJECTION INTRAVENOUS ONCE
Status: COMPLETED | OUTPATIENT
Start: 2020-01-01 | End: 2020-01-01

## 2020-01-01 RX ORDER — GUAIFENESIN 100 MG/5ML
200 SYRUP ORAL EVERY 4 HOURS PRN
COMMUNITY

## 2020-01-01 RX ORDER — DEXTROSE MONOHYDRATE 25 G/50ML
50 INJECTION, SOLUTION INTRAVENOUS ONCE
Status: COMPLETED | OUTPATIENT
Start: 2020-01-01 | End: 2020-01-01

## 2020-01-01 RX ORDER — DOXEPIN HYDROCHLORIDE 50 MG/1
1 CAPSULE ORAL DAILY
Status: DISCONTINUED | OUTPATIENT
Start: 2020-01-01 | End: 2020-01-01

## 2020-01-01 RX ORDER — FAMOTIDINE 20 MG/1
20 TABLET ORAL NIGHTLY
Status: DISCONTINUED | OUTPATIENT
Start: 2020-01-01 | End: 2020-01-01

## 2020-01-01 RX ORDER — HEPARIN SODIUM 5000 [USP'U]/ML
5000 INJECTION, SOLUTION INTRAVENOUS; SUBCUTANEOUS EVERY 12 HOURS SCHEDULED
Status: DISCONTINUED | OUTPATIENT
Start: 2020-01-01 | End: 2020-01-01

## 2020-01-01 RX ORDER — SODIUM CHLORIDE, SODIUM LACTATE, POTASSIUM CHLORIDE, CALCIUM CHLORIDE 600; 310; 30; 20 MG/100ML; MG/100ML; MG/100ML; MG/100ML
INJECTION, SOLUTION INTRAVENOUS CONTINUOUS
Status: DISCONTINUED | OUTPATIENT
Start: 2020-01-01 | End: 2020-01-01

## 2020-01-01 RX ORDER — MIDAZOLAM HYDROCHLORIDE 1 MG/ML
INJECTION INTRAMUSCULAR; INTRAVENOUS
Status: DISPENSED
Start: 2020-01-01 | End: 2020-01-01

## 2020-01-01 RX ORDER — ONDANSETRON 2 MG/ML
4 INJECTION INTRAMUSCULAR; INTRAVENOUS EVERY 6 HOURS PRN
Status: DISCONTINUED | OUTPATIENT
Start: 2020-01-01 | End: 2020-01-01

## 2020-01-01 RX ORDER — FAMOTIDINE 10 MG/ML
20 INJECTION, SOLUTION INTRAVENOUS NIGHTLY
Status: DISCONTINUED | OUTPATIENT
Start: 2020-01-01 | End: 2020-01-01

## 2020-01-01 RX ORDER — ACETAMINOPHEN 650 MG/1
650 SUPPOSITORY RECTAL EVERY 6 HOURS PRN
Status: DISCONTINUED | OUTPATIENT
Start: 2020-01-01 | End: 2020-01-01

## 2020-01-01 RX ORDER — METOPROLOL TARTRATE 5 MG/5ML
2.5 INJECTION INTRAVENOUS EVERY 6 HOURS SCHEDULED
Status: DISCONTINUED | OUTPATIENT
Start: 2020-01-01 | End: 2020-01-01

## 2020-01-01 RX ORDER — BISACODYL 10 MG
10 SUPPOSITORY, RECTAL RECTAL
Status: DISCONTINUED | OUTPATIENT
Start: 2020-01-01 | End: 2020-01-01

## 2020-01-01 RX ORDER — SODIUM CHLORIDE 450 MG/100ML
INJECTION, SOLUTION INTRAVENOUS CONTINUOUS
Status: DISCONTINUED | OUTPATIENT
Start: 2020-01-01 | End: 2020-01-01

## 2020-01-01 RX ORDER — MIDODRINE HYDROCHLORIDE 5 MG/1
5 TABLET ORAL 3 TIMES DAILY
Qty: 90 TABLET | Refills: 0 | Status: SHIPPED | OUTPATIENT
Start: 2020-01-01

## 2020-01-01 RX ORDER — LEVOFLOXACIN 5 MG/ML
500 INJECTION, SOLUTION INTRAVENOUS EVERY 24 HOURS
COMMUNITY
Start: 2020-01-01 | End: 2020-12-14

## 2020-01-01 RX ORDER — DILTIAZEM HYDROCHLORIDE 5 MG/ML
INJECTION INTRAVENOUS
Status: COMPLETED
Start: 2020-01-01 | End: 2020-01-01

## 2020-01-01 RX ORDER — ACETAMINOPHEN 500 MG
500 TABLET ORAL EVERY 8 HOURS PRN
Status: DISCONTINUED | OUTPATIENT
Start: 2020-01-01 | End: 2020-01-01

## 2020-01-01 RX ORDER — SERTRALINE HYDROCHLORIDE 25 MG/1
25 TABLET, FILM COATED ORAL DAILY
COMMUNITY
End: 2020-01-01

## 2020-01-01 RX ORDER — AMINO ACIDS/PROTEIN HYDROLYS 15G-100/30
30 LIQUID (ML) ORAL DAILY
COMMUNITY

## 2020-01-01 RX ORDER — MIRTAZAPINE 15 MG/1
15 TABLET, FILM COATED ORAL NIGHTLY
Status: DISCONTINUED | OUTPATIENT
Start: 2020-01-01 | End: 2020-01-01

## 2020-01-01 RX ORDER — HYDROMORPHONE HYDROCHLORIDE 1 MG/ML
0.4 INJECTION, SOLUTION INTRAMUSCULAR; INTRAVENOUS; SUBCUTANEOUS EVERY 5 MIN PRN
Status: DISCONTINUED | OUTPATIENT
Start: 2020-01-01 | End: 2020-01-01 | Stop reason: HOSPADM

## 2020-01-01 RX ORDER — SODIUM CHLORIDE 9 MG/ML
INJECTION, SOLUTION INTRAVENOUS CONTINUOUS PRN
Status: DISCONTINUED | OUTPATIENT
Start: 2020-01-01 | End: 2020-01-01 | Stop reason: SURG

## 2020-01-01 RX ORDER — SCOLOPAMINE TRANSDERMAL SYSTEM 1 MG/1
1 PATCH, EXTENDED RELEASE TRANSDERMAL
Status: DISCONTINUED | OUTPATIENT
Start: 2020-01-01 | End: 2020-01-01

## 2020-01-01 RX ORDER — BUPIVACAINE HYDROCHLORIDE AND EPINEPHRINE 2.5; 5 MG/ML; UG/ML
INJECTION, SOLUTION EPIDURAL; INFILTRATION; INTRACAUDAL; PERINEURAL AS NEEDED
Status: DISCONTINUED | OUTPATIENT
Start: 2020-01-01 | End: 2020-01-01 | Stop reason: HOSPADM

## 2020-01-01 RX ORDER — AMLODIPINE BESYLATE 5 MG/1
5 TABLET ORAL DAILY
Status: DISCONTINUED | OUTPATIENT
Start: 2020-01-01 | End: 2020-01-01

## 2020-01-01 RX ORDER — ASPIRIN 81 MG/1
81 TABLET, CHEWABLE ORAL DAILY
Status: DISCONTINUED | OUTPATIENT
Start: 2020-01-01 | End: 2020-01-01

## 2020-01-01 RX ORDER — SODIUM CHLORIDE 0.9 % (FLUSH) 0.9 %
10 SYRINGE (ML) INJECTION EVERY 12 HOURS
Status: DISCONTINUED | OUTPATIENT
Start: 2020-01-01 | End: 2020-01-01

## 2020-01-01 RX ORDER — POTASSIUM CHLORIDE 20 MEQ/1
40 TABLET, EXTENDED RELEASE ORAL EVERY 4 HOURS
Status: COMPLETED | OUTPATIENT
Start: 2020-01-01 | End: 2020-01-01

## 2020-01-01 RX ORDER — FAMOTIDINE 40 MG/1
40 TABLET, FILM COATED ORAL NIGHTLY
COMMUNITY

## 2020-01-01 RX ORDER — GLYCOPYRROLATE 0.2 MG/ML
INJECTION, SOLUTION INTRAMUSCULAR; INTRAVENOUS AS NEEDED
Status: DISCONTINUED | OUTPATIENT
Start: 2020-01-01 | End: 2020-01-01 | Stop reason: SURG

## 2020-01-01 RX ORDER — AMLODIPINE BESYLATE 2.5 MG/1
2.5 TABLET ORAL DAILY
Status: DISCONTINUED | OUTPATIENT
Start: 2020-01-01 | End: 2020-01-01

## 2020-01-01 RX ORDER — DEXTROSE MONOHYDRATE 25 G/50ML
50 INJECTION, SOLUTION INTRAVENOUS
Status: DISCONTINUED | OUTPATIENT
Start: 2020-01-01 | End: 2020-01-01

## 2020-01-01 RX ORDER — ASPIRIN 81 MG
1 TABLET, DELAYED RELEASE (ENTERIC COATED) ORAL DAILY
COMMUNITY

## 2020-01-01 RX ORDER — LORAZEPAM 2 MG/ML
1 INJECTION INTRAMUSCULAR ONCE
Status: DISCONTINUED | OUTPATIENT
Start: 2020-01-01 | End: 2020-01-01

## 2020-01-01 RX ORDER — MEMANTINE HYDROCHLORIDE 5 MG/1
5 TABLET ORAL 2 TIMES DAILY
Status: DISCONTINUED | OUTPATIENT
Start: 2020-01-01 | End: 2020-01-01

## 2020-01-01 RX ORDER — MELATONIN
325
COMMUNITY

## 2020-01-01 RX ORDER — DIPHENHYDRAMINE HYDROCHLORIDE 50 MG/ML
25 INJECTION INTRAMUSCULAR; INTRAVENOUS EVERY 6 HOURS PRN
Status: DISCONTINUED | OUTPATIENT
Start: 2020-01-01 | End: 2020-01-01

## 2020-01-01 RX ORDER — SODIUM CHLORIDE 9 MG/ML
INJECTION, SOLUTION INTRAVENOUS ONCE
Status: COMPLETED | OUTPATIENT
Start: 2020-01-01 | End: 2020-01-01

## 2020-01-01 RX ORDER — SERTRALINE HYDROCHLORIDE 25 MG/1
25 TABLET, FILM COATED ORAL DAILY
Status: DISCONTINUED | OUTPATIENT
Start: 2020-01-01 | End: 2020-01-01

## 2020-01-01 RX ORDER — L.ACID,PARA/B.BIFIDUM/S.THERM 8B CELL
1 CAPSULE ORAL DAILY
Status: ON HOLD | COMMUNITY
Start: 2020-01-01 | End: 2020-01-01

## 2020-01-01 RX ORDER — CLOTRIMAZOLE 1 %
CREAM (GRAM) TOPICAL 2 TIMES DAILY
Status: DISCONTINUED | OUTPATIENT
Start: 2020-01-01 | End: 2020-01-01

## 2020-01-01 RX ORDER — SODIUM POLYSTYRENE SULFONATE 4.1 MEQ/G
30 POWDER, FOR SUSPENSION ORAL; RECTAL ONCE
Status: COMPLETED | OUTPATIENT
Start: 2020-01-01 | End: 2020-01-01

## 2020-01-01 RX ORDER — GUAIFENESIN 100 MG/5ML
200 SYRUP ORAL 4 TIMES DAILY
Status: ON HOLD | COMMUNITY
End: 2020-01-01 | Stop reason: ALTCHOICE

## 2020-01-01 RX ORDER — LIDOCAINE HYDROCHLORIDE 10 MG/ML
INJECTION, SOLUTION INFILTRATION; PERINEURAL
Status: COMPLETED
Start: 2020-01-01 | End: 2020-01-01

## 2020-01-01 RX ORDER — NYSTATIN 100000 [USP'U]/G
1 POWDER TOPICAL 4 TIMES DAILY
COMMUNITY

## 2020-01-01 RX ORDER — SODIUM CHLORIDE 9 MG/ML
500 INJECTION, SOLUTION INTRAVENOUS CONTINUOUS
Status: DISCONTINUED | OUTPATIENT
Start: 2020-01-01 | End: 2020-01-01

## 2020-01-01 RX ORDER — POLYETHYLENE GLYCOL 3350 17 G/17G
17 POWDER, FOR SOLUTION ORAL DAILY PRN
Status: DISCONTINUED | OUTPATIENT
Start: 2020-01-01 | End: 2020-01-01

## 2020-01-01 RX ORDER — DEXTROSE MONOHYDRATE 25 G/50ML
50 INJECTION, SOLUTION INTRAVENOUS ONCE
Status: DISCONTINUED | OUTPATIENT
Start: 2020-01-01 | End: 2020-01-01

## 2020-01-01 RX ORDER — DONEPEZIL HYDROCHLORIDE 10 MG/1
10 TABLET, FILM COATED ORAL NIGHTLY
COMMUNITY
Start: 2020-01-01

## 2020-01-01 RX ORDER — AMINO ACIDS/PROTEIN HYDROLYS 15G-100/30
30 LIQUID (ML) ORAL DAILY
Status: DISCONTINUED | OUTPATIENT
Start: 2020-01-01 | End: 2020-01-01

## 2020-01-01 RX ORDER — METOCLOPRAMIDE HYDROCHLORIDE 5 MG/ML
5 INJECTION INTRAMUSCULAR; INTRAVENOUS EVERY 8 HOURS PRN
Status: DISCONTINUED | OUTPATIENT
Start: 2020-01-01 | End: 2020-01-01

## 2020-01-01 RX ORDER — ACETAMINOPHEN 500 MG
500 TABLET ORAL EVERY 8 HOURS PRN
COMMUNITY

## 2020-01-01 RX ORDER — MAGNESIUM OXIDE 400 MG (241.3 MG MAGNESIUM) TABLET
400 TABLET ONCE
Status: COMPLETED | OUTPATIENT
Start: 2020-01-01 | End: 2020-01-01

## 2020-01-01 RX ORDER — DEXTROSE AND SODIUM CHLORIDE 5; .9 G/100ML; G/100ML
INJECTION, SOLUTION INTRAVENOUS CONTINUOUS
Status: DISCONTINUED | OUTPATIENT
Start: 2020-01-01 | End: 2020-01-01

## 2020-01-01 RX ORDER — NYSTATIN AND TRIAMCINOLONE ACETONIDE 100000; 1 [USP'U]/G; MG/G
1 OINTMENT TOPICAL 2 TIMES DAILY
COMMUNITY

## 2020-01-01 RX ORDER — DEXTROSE MONOHYDRATE 25 G/50ML
50 INJECTION, SOLUTION INTRAVENOUS
Status: DISCONTINUED | OUTPATIENT
Start: 2020-01-01 | End: 2020-01-01 | Stop reason: HOSPADM

## 2020-01-01 RX ORDER — AMLODIPINE BESYLATE 5 MG/1
5 TABLET ORAL DAILY
Qty: 30 TABLET | Refills: 3 | Status: ON HOLD | OUTPATIENT
Start: 2020-01-01 | End: 2020-01-01

## 2020-01-01 RX ORDER — METOPROLOL TARTRATE 5 MG/5ML
5 INJECTION INTRAVENOUS EVERY 6 HOURS PRN
Status: DISCONTINUED | OUTPATIENT
Start: 2020-01-01 | End: 2020-01-01

## 2020-01-01 RX ORDER — METOPROLOL TARTRATE 5 MG/5ML
2.5 INJECTION INTRAVENOUS EVERY 8 HOURS
Status: DISCONTINUED | OUTPATIENT
Start: 2020-01-01 | End: 2020-01-01

## 2020-01-01 RX ORDER — LORAZEPAM 2 MG/ML
1 INJECTION INTRAMUSCULAR EVERY 4 HOURS PRN
Status: DISCONTINUED | OUTPATIENT
Start: 2020-01-01 | End: 2020-01-01

## 2020-01-01 RX ORDER — FOAM BANDAGE 4" X 4"
BANDAGE TOPICAL NIGHTLY
COMMUNITY
End: 2020-01-01 | Stop reason: CLARIF

## 2020-01-01 RX ORDER — LIDOCAINE HYDROCHLORIDE 10 MG/ML
INJECTION, SOLUTION EPIDURAL; INFILTRATION; INTRACAUDAL; PERINEURAL AS NEEDED
Status: DISCONTINUED | OUTPATIENT
Start: 2020-01-01 | End: 2020-01-01 | Stop reason: SURG

## 2020-01-01 RX ORDER — MIRTAZAPINE 15 MG/1
15 TABLET, FILM COATED ORAL NIGHTLY
COMMUNITY

## 2020-01-01 RX ADMIN — SODIUM CHLORIDE: 9 INJECTION, SOLUTION INTRAVENOUS at 19:38:00

## 2020-01-01 RX ADMIN — GLYCOPYRROLATE 0.2 MG: 0.2 INJECTION, SOLUTION INTRAMUSCULAR; INTRAVENOUS at 19:08:00

## 2020-01-01 RX ADMIN — LIDOCAINE HYDROCHLORIDE 50 MG: 10 INJECTION, SOLUTION EPIDURAL; INFILTRATION; INTRACAUDAL; PERINEURAL at 18:45:00

## 2020-01-01 RX ADMIN — SODIUM CHLORIDE: 9 INJECTION, SOLUTION INTRAVENOUS at 18:35:00

## 2020-04-14 PROBLEM — E87.2 METABOLIC ACIDOSIS: Status: ACTIVE | Noted: 2020-01-01

## 2020-04-14 PROBLEM — N17.9 ACUTE KIDNEY INJURY (HCC): Status: ACTIVE | Noted: 2020-01-01

## 2020-04-14 PROBLEM — E16.2 HYPOGLYCEMIA: Status: ACTIVE | Noted: 2020-01-01

## 2020-04-14 PROBLEM — N17.9 ACUTE RENAL FAILURE (ARF) (HCC): Status: ACTIVE | Noted: 2020-01-01

## 2020-04-14 PROBLEM — A49.9 UTI (URINARY TRACT INFECTION), BACTERIAL: Status: ACTIVE | Noted: 2019-06-14

## 2020-04-14 PROBLEM — N39.0 UTI (URINARY TRACT INFECTION), BACTERIAL: Status: ACTIVE | Noted: 2019-06-14

## 2020-04-14 NOTE — ED INITIAL ASSESSMENT (HPI)
From [facility]. Progressive cognitive and physical decline over last couple of weeks as per EMS. Tested for COVID this am; no results yet. EMS called for BUN-90, creatinine-5.9, platelets- 660.  Patient presenting as nonverbal on triage

## 2020-04-15 PROBLEM — R77.8 ELEVATED TROPONIN: Status: ACTIVE | Noted: 2020-01-01

## 2020-04-15 PROBLEM — Z51.5 PALLIATIVE CARE ENCOUNTER: Status: ACTIVE | Noted: 2020-01-01

## 2020-04-15 PROBLEM — N19 RENAL FAILURE, UNSPECIFIED CHRONICITY: Status: ACTIVE | Noted: 2020-01-01

## 2020-04-15 NOTE — CONSULTS
[address] Patient Status:  Inpatient    10/3/1935 MRN [id number]   [facility] 4SW-A Attending Kirsten Fuentes MD   Hosp Day # 0 PCP Jh Cadena MD     Date of Consult: 04/15/20   Deloise Spurling • UTI (urinary tract infection)      Past Surgical History:   Procedure Laterality Date   • APPENDECTOMY     • CHOLECYSTECTOMY         Palliative Care Social History:        Substance Use History:  Social History    Tobacco Use      Smoking status: Never (LEVOPHED) 4 mg/250 ml premix infusion, 0.5-30 mcg/min, Intravenous, Continuous  No current outpatient medications on file.       SUBJECTIVE  Review of Systems – Palliative Care Symptom Assessment     ANASTACIO - visit/exam deferred for preservation of PPE as not FINDINGS:  Patient is tilted towards the left. No new focal consolidation, pleural effusion, or pneumothorax. Osseous structures are stable. CONCLUSION:    1. No focal consolidation.   No significant change, allowing for differences in technique whe bed Extensive Disease  Can't do any work Mainly Assist Normal or Reduced Full or confused   30 Bedbound Extensive Disease  Can't do any work Max Assist  Total Care Reduced Drowsy/confused   20 Bedbound Extensive Disease  Can't do any work Max Assist  Total is concerned the isolation and impact of viral outbreak are impacting her physical and emotional health, and is hoping for her recovery and ongoing stability.  He is appropriately aware of her chronic conditions, advanced age, and is realistic with his unde Planning Counseling/Discussion:     Code Status:  DNR, DNI (with Selective treatment - PRESSORS ARE OK), No medically administered means of nutrition per previously completed POLST on file dated 8/5/20019. Confirmed with son/HCPKUMAR, Bill today.     HCPOA: Suspected COVID-19 virus infection     CKD (chronic kidney disease) stage 3, GFR 30-59 ml/min (HCC)     Septic shock (HCC)     Septicemia [facility])     Palliative care encounter        Palliative Care Recommendations/Plan:       Goals of Care: Ongoing discussi

## 2020-04-15 NOTE — ED PROVIDER NOTES
Patient Seen in: [facility] Emergency Department      History   Patient presents with:  Abnormal Labs    Stated Complaint:     81-year-old female presents today with decline in mental status.   Over the last couple days has progressively gotten worse 96.7 °F (35.9 °C)   Temp src 04/14/20 1854 Temporal   SpO2 04/14/20 1854 96 %   O2 Device 04/14/20 1854 None (Room air)       Current:/49   Pulse 59   Temp 97.5 °F (36.4 °C) (Temporal)   Resp 14   Ht 162.6 cm (5' 4\")   Wt 70 kg   SpO2 98%   BMI 26. 4 Large (*)     WBC Urine >50 (*)     RBC URINE >10 (*)     Bacteria Urine 3+ (*)     Squamous Epi.  Cells Large (*)     Mucous Urine 2+ (*)     WBC Clump Present (*)     All other components within normal limits   TROPONIN I - Abnormal; Notable for the follo PLATELET.   Procedure                               Abnormality         Status                     ---------                               -----------         ------                     CBC W/ DIFFERENTIAL[[id number]]          Abnormal            Final resul elevated white count of 12.1. Chest x-ray showed no consolidation or acute findings. Rapid COVID-19 testing was negative. Formal testing will be done. Blood cultures were sent and are pending. Unable to get urine at this time.   Awaiting CMP troponin a

## 2020-04-15 NOTE — PLAN OF CARE
Assumed pt care at 0730. Pt in bed resting quietly. No s/s of acute distress noted at this time. VSS. Pt denies any pain at this time. Pt is alert and oriented X 2, and she speaks VERY softly, hard to understand. Pt is on room air.  Dulce Mariaph

## 2020-04-15 NOTE — PROGRESS NOTES
[facility] Dosing Service    Initial Pharmacokinetic Consult for Vancomycin Dosing     Chaya Desai is a 81year old female who is being treated for MRSA and possible UTI.   Pharmacy has been asked to dose Vancomycin by Dr. Darek Atkins    She is allergic to p

## 2020-04-15 NOTE — VASCULAR ACCESS
Consulted to place a PICC line. PT evaluated and discussed with SARY Shearer. Order changed to Midline. Verbal Ok from [address] to place a Line as needed.

## 2020-04-15 NOTE — CM/SW NOTE
Pt from [facility] with positive cases  Rapid COVID test is negative   Second COVID test still in process

## 2020-04-15 NOTE — PROGRESS NOTES
[facility] Pharmacy Note:  Dose Adjustment for levofloxacin [facility])    Zhanna Villafuerte is a 81year old female who has been prescribed levofloxacin (LEVAQUIN) 500 mg IVPB once.   CrCl is estimated creatinine clearance is 5.9 mL/min (A) (based on SCr of 6.19 mg/dL

## 2020-04-15 NOTE — PROGRESS NOTES
[facility]  Progress Note     Dudley Erickson Patient Status:  Inpatient    10/3/1935 MRN [id number]   [facility] 4SW-A Attending Rc Casey MD   Hosp Day # 0 PCP Elicia Weber MD     Chief Complaint: hypotension, diarrhea    S: CKD  1. Likely prerenal  2. IVF, add bicarb gtt today   3. Improving  4. nephro following  2. UTI  1. Rocephin  2. UCx pending  3. On methenamine as OP, hold for now  3. Recent PNA  1. Rapid COVID sent in ED, neg  2. CXR negative  3. PCR COVID pending.  Sti

## 2020-04-15 NOTE — PROGRESS NOTES
Patient COVID test negative but high exposure to COVID positive patients. .  Per ID, patient to remain in contact and droplet isolation at this time.     DARI Barcenas  Critical Care NP  Phone [phone], pager [phone]

## 2020-04-15 NOTE — ED NOTES
RN unsuccessful with straight cath. Significant skin breakdown noted to perineal area. Patient had multiple episodes of diarrhea in small amounts while RN was in room. Family states patient has been on antibiotics. Pericare performed. MD updated.

## 2020-04-15 NOTE — CONSULTS
INFECTIOUS DISEASE CONSULT NOTE    Juanjo Farnsworth Patient Status:  Inpatient    10/3/1935 MRN [id number]   [facility] 4SW-A Attending Tika Murphy MD   Hosp Day # 0 PCP Hafsa Quinones, NAUSEA AND VOMITING  Trimethoprim            UNKNOWN  Celecoxib               NAUSEA ONLY    Medications:    Current Facility-Administered Medications:   •  aspirin chewable tab 81 mg, 81 mg, Oral, Daily  •  Memantine HCl (NAMENDA) tab 5 mg, 5 mg, Oral, BI rate and rhythm. No sign murmurs. Abdomen: Soft, seemed nontender, nondistended. Positive bowel sounds.  +umbilical hernia, soft  Musculoskeletal: No arthritis or deformity, did not seem to have pain with passive ROM of knees, elbows, wrists and ankles pneumothorax. Osseous structures are stable. CONCLUSION:  1. No focal consolidation. No significant change, allowing for differences in technique when compared to 8/2/2019 chest radiograph.     Dictated by: Shwetha Bardales MD on 4/14/2020 at 9:04 PM     Final

## 2020-04-15 NOTE — DIETARY NOTE
Felicia     Admitting diagnosis:  Elevated troponin [R79.89]  Altered mental status, unspecified altered mental status type [R41.82]  Renal failure, unspecified chronicity [N19]    Ht: 162.6 cm (5' 4\")  Wt: 70

## 2020-04-15 NOTE — CONSULTS
[facility]  Report of Consultation    Jairo Olivares Patient Status:  Inpatient    10/3/1935 MRN [id number]   [facility] 4SW-A Attending Luis Dorantes MD   Hosp Day # 0 PCP Abi Wong MD     Reason for Consultation:  SHWETHA/CKD III/ac (TYLENOL) tab 650 mg, 650 mg, Oral, Q6H PRN  •  ondansetron HCl (ZOFRAN) injection 4 mg, 4 mg, Intravenous, Q6H PRN  •  Metoclopramide HCl (REGLAN) injection 5 mg, 5 mg, Intravenous, Q8H PRN  •  Heparin Sodium (Porcine) 5000 UNIT/ML injection 5,000 Units, 04/14/2020    CREATSERUM 4.95 04/15/2020    BUN 83 04/15/2020     04/15/2020    K 3.7 04/15/2020     04/15/2020    CO2 14.0 04/15/2020    GLU 90 04/15/2020    CA 6.8 04/15/2020    ALB 3.4 04/14/2020    ALKPHO 103 04/14/2020    BILT 0.4 04/14/20

## 2020-04-15 NOTE — H&P
[facility]  History and Physical     Marilyn Mcghee Patient Status:  Emergency    10/3/1935 MRN [id number]   Location [address] Attending No att. providers found   [facility] Day # 0 PCP Karina Bowser MD     Chief Complaint Tab, Take 1 tablet by mouth daily. , Disp: , Rfl:   Gauze Pads & Dressings (OPTIFOAM) 6\"X6\" Does not apply Pads, by Does not apply route., Disp: , Rfl:   acetaminophen (TYLENOL EXTRA STRENGTH) 500 MG Oral Tab, Take 500 mg by mouth every 8 (eight) hours as organomegaly. Neurologic: No focal neurological deficits. CNII-XII grossly intact. Musculoskeletal: Moves all extremities. Extremities: No edema or cyanosis. Integument: No rashes or lesions. Psychiatric: Appropriate mood and affect.       Diagnostic

## 2020-04-15 NOTE — CONSULTS
[facility] Patient Status:  Inpatient    10/3/1935 MRN [id number]   [facility] 4SW-A Attending Mikey Rubio MD   Hosp Day # 0 PCP Dante Da Silva MD     Date of Admission: 2020  Admission Diagnosis: Elevated trop Dressings (OPTIFOAM) 4\"X4\" Does not apply Pads, Apply topically nightly. To right thigh for skin condition. Cleanse area w/ns, apply.   optifoam gentle, Disp: , Rfl:   guaiFENesin (ROBAFEN) 100 MG/5ML Oral Syrup, Take 200 mg by mouth 4 (four) times daily 650 mg, 650 mg, Oral, Q6H PRN  •  ondansetron HCl (ZOFRAN) injection 4 mg, 4 mg, Intravenous, Q6H PRN  •  Metoclopramide HCl (REGLAN) injection 5 mg, 5 mg, Intravenous, Q8H PRN  •  Heparin Sodium (Porcine) 5000 UNIT/ML injection 5,000 Units, 5,000 Units, S HGB 14.9 04/14/2020    HCT 48.5 04/14/2020    MCV 95.8 04/14/2020    MCH 29.4 04/14/2020    MCHC 30.7 04/14/2020    RDW 14.4 04/14/2020    .0 04/14/2020     Lab Results   Component Value Date     04/15/2020    K 3.7 04/15/2020     04/15/ check ABG to r/o CO2 retention, check ammonia  - BIPAP prn if retaining CO2  3. SHWETHA- renal consulted  - gentle hydration  - per renal service  - trend UO and creat  4. F/E/N- NPO until more awake, IVFs per renal.  Lytes prn  5. Proph- SC hep  6.  Dispo- DNR

## 2020-04-15 NOTE — PLAN OF CARE
Patient arrived to unit at approx 0045 this morning. Patient drowsy but easily aroused. Patient able to state her name and birthdate and is able to state that she is in the hospital. Disoriented to time and situation.  Patient with multiple episodes of wate

## 2020-04-15 NOTE — ED PROVIDER NOTES
Received signout from my partner, Dr. Tawny Taylor. Awaiting rest of laboratory results. Patient blood pressure continued to drop despite IV fluids. She was given 3 L of IV fluids with blood pressure in the 70s. Fourth liter was started.   Decision made to pl

## 2020-04-15 NOTE — ED NOTES
Dr. Denys Garcia called  linnette RUANO at  for consent of central line placement, 2 RN verification of consent over phone Tamar Ying and [facility] ER RN.

## 2020-04-15 NOTE — PROGRESS NOTES
ICU  Critical Care APRN Progress Note    NAME: Marylu Johnston - ROOM: 791/335-J - MRN: [id number] - Age: 81year old - :10/3/1935    History Of Present Illness:  Marylu Johnston is a 81year old female with PMHx significant for dementia, HLD, GERD, HTN, CKD air movement on palpation on exam.   Extremities: Extremities normal, atraumatic, no cyanosis or edema,capillary refill <3 sec. Feet are cool bilaterally.    Pulses: 2+ and symmetric all extremities  Skin: Skin color, texture, turgor normal for ethnicity, n face time) with the patient, family, and clinical staff.

## 2020-04-16 NOTE — PROGRESS NOTES
[facility]  Nephrology Progress Note    Giancarlo Carbajal Patient Status:  Inpatient    10/3/1935 MRN [id number]   [facility] 4SW-A Attending Mikey Rubio MD   Hosp Day # 1 PCP Dante Da Silva MD       SUBJECTIVE:  Stable off pressors [facility] 04/14/20  2252   PGLU 120*       Meds:   aspirin chewable tab 81 mg, 81 mg, Oral, Daily  Memantine HCl (NAMENDA) tab 5 mg, 5 mg, Oral, BID  famoTIDine (PEPCID) tab 20 mg, 20 mg, Oral, Daily  folic acid (FOLVITE) tab 1 mg, 1 mg, Oral, Daily  acetaminophen (

## 2020-04-16 NOTE — PROGRESS NOTES
[facility]  Progress Note     Recardo Pinky Patient Status:  Inpatient    10/3/1935 MRN [id number]   [facility] 4SW-A Attending Hector Nino MD   Hosp Day # 1 PCP Blanche Marques MD     Chief Complaint: hypotension, diarrhea    S:d 5 mg Oral BID   • famoTIDine  20 mg Oral Daily   • folic acid  1 mg Oral Daily   • cefTRIAXone  1 g Intravenous Q24H   • Sertraline HCl  25 mg Oral Daily   • Normal Saline Flush  10 mL Intravenous Q12H   • [START ON 4/17/2020] vancomycin  15 mg/kg Intraven

## 2020-04-16 NOTE — PLAN OF CARE
Called in report to [address]  Also left message with Kya Fernandes in regards to transferring patient to room 522. Assumed patient care from night shift RN. Patient oriented to self. Follows simple commands. SR on monitor. VS stable.   RA  Incontinent of

## 2020-04-16 NOTE — PROGRESS NOTES
Pulmonary Progress Note        NAME: Shae Richardson - ROOM: 539440-L - MRN: [id number] - Age: 81year old - : 10/3/1935        Last 24hrs: No events overnight, remains awake but unresponsive    OBJECTIVE:   20  0300 20  0400 20  0500 04 facility with other reported cases will maintain on isolation  3. Encephalopathy- baseline dementia. Now with acute sepsis  - reorient frequently  4. SHWETHA- per renal  5. F/E/N- NPO until mentation improves, IVFs per renal.  Lytes prn  6. Proph- SC hep  7.  D

## 2020-04-16 NOTE — CM/SW NOTE
Spoke w/MARCO ANTONIO Lopes in regards to [facility]. He stated he is not really wanting the pt to return to [facility], but understands it may be the only option. He stated he is working w/[employer] now.  He wanted SW to check if [facility] and [facility]. [facility] had beds availabl

## 2020-04-16 NOTE — PROGRESS NOTES
[address] [facility] 149 Follow Up    Hawa Coombs Patient Status:  Inpatient    10/3/1935 MRN [id number]   [facility] 4SW-A Attending Kailee Cook MD   [facility] Day # 1 PCP Emmy Leon MD     Date of visit:  2020  Day 1 of h kidneys and bladder was performed. Routine technique was utilized. FINDINGS:     RIGHT KIDNEY:  MEASUREMENTS:  9.8 x 4.4 x 4.1 cm  ECHOGENICITY:  Cortical scarring. HYDRONEPHROSIS:  None.   CYSTS/STONES/MASSES:  2.3 x 1.9 x 1.1 cm cyst inferior pole disease  Normal Full Normal Full   80 Full Some disease  Normal w/effort Full Normal or  Reduced Full   70 Reduced Some disease  Can't perform job Full Normal or   Reduced Full   60 Reduced Significant disease  Can't perform hobby Occasional  Assist Normal below  Healthcare Agent Appointed: Yes  Healthcare Agent's Name: Surrogate is Leonor pulido Brain Agent's Phone Number: [phone]    Disposition: Ongoing GOC discussions pending course.     Problem List:       Patient Active Problem List:     DI on file. Surrogate is son, Leonidas Escobar [phone]. Disposition:  Ongoing discussions pending course.       A total of 15 minutes were spent on this visit, which included all of the following:  Direct face-to-face contact, history taking, physical exa

## 2020-04-16 NOTE — PLAN OF CARE
Pt received from icu this afternoon. Pt A&Ox1, on ra, tele &  in place. Scds & bunny boots in place/ applied. Pt turned to Left side, waffle cushion & pillow between legs applied. to turned frequently.  Pt has pressure ulcer to sacrum, im md seen, dede

## 2020-04-16 NOTE — PROGRESS NOTES
INFECTIOUS DISEASE PROGRESS NOTE    Chaya Desai Patient Status:  Inpatient    10/3/1935 MRN [id number]   [facility] 4SW-A Attending Liam Dalton MD   [address] Day # 1 PCP Macario Simon 117*   CO2 20.0* 14.0* 25.0   ALKPHO 103  --  60   AST 32  --  14*   ALT 17  --  10*   BILT 0.4  --  0.2   TP 8.6*  --  5.1*     Recent Labs   Lab 04/14/20 2005   COLORUR Desirae*   CLARITY Cloudy*   SPECGRAVITY 1.016   GLUUR Negative   BILUR Negative   KET truly MRSA and not a coag neg staph)  - await new BCx  - UCx is neg, d/c CTX  - pt at high risk for Covid as coming from facility with outbreak.  Would keep on iso while here  - follow MS and resp status  D/w other MDs  Will follow    Jose Luis Key

## 2020-04-16 NOTE — CM/SW NOTE
04/16/20 0900   CM/SW Referral Data   Referral Source Social Work (self-referral)   Reason for Referral Discharge planning   Informant   (Chart review)   Patient Info   Patient's Mental Status Confused   Patient's [facility]

## 2020-04-17 PROCEDURE — 99233 SBSQ HOSP IP/OBS HIGH 50: CPT | Performed by: INTERNAL MEDICINE

## 2020-04-17 NOTE — PLAN OF CARE
PT drowsy this PM, sometimes responsive and knows name, mostly nonverbal. No signs of pain tonight. VSS on RA, tele and . SCDS in place bilaterally, bunny boots on bilaterally. Pt is incontient of bowel and bladder.  1 episode of incontience so far, soft goal  Description  INTERVENTIONS:  - Encourage pt to monitor pain and request assistance  - Assess pain using appropriate pain scale  - Administer analgesics based on type and severity of pain and evaluate response  - Implement non-pharmacological measures to assist at discharge as needed  - Consider post-discharge preferences of patient/family/discharge partner  - Complete POLST form as appropriate  - Assess patient's ability to be responsible for managing their own health  - Refer to Case Management Depart

## 2020-04-17 NOTE — PLAN OF CARE
Call placed at [phone] for new consult to cardiology. Patient remains bradycardic in the 30's to low 40's. Spoke to [employer] APN. Orders for EKG. Immediate return call from Dr. Mcfadden Bolds with cardiology, discussed bradycardia in the 30's during sleep.  I

## 2020-04-17 NOTE — SLP NOTE
ADULT SWALLOWING EVALUATION    ASSESSMENT    ASSESSMENT/OVERALL IMPRESSION:  Orders were received for a bedside swallowing evaluation as she was on a modified diet at the NH.  Spoke with patient's daughter in law, Mary Ellen Arthur RN on ortho unit, prior to [employer]: NPO  Diet Recommendations - Liquid: NPO        Medication Administration Recommendations: Non-oral  Treatment Plan/Recommendations: SLP to reassess(will follow up virtually with RN prior to reassess)  Discharge Recommendations/Plan: Undetermined assess  Range of Motion: Unable to assess(patient performed minimal movements on command)  Rate of Motion: Reduced(slow movements when able to follow commands)    Voice Quality: (no attempts made to speak)  Respiratory Status: Unlabored  Consistencies Tria

## 2020-04-17 NOTE — PHYSICAL THERAPY NOTE
PHYSICAL THERAPY EVALUATION - INPATIENT     Room Number: 522/522-A  Evaluation Date: 4/17/2020  Type of Evaluation: Initial  Physician Order: PT Eval and Treat    Presenting Problem: AMS, sepsis  Reason for Therapy: Mobility Dysfunction and Discharge ambulatory with RW and guidance. Pt has been more limited verbally, per DIL. SUBJECTIVE  Pt able to mouth her name. Limited verbalization and communication during session. Patient self-stated goal is not stated.      OBJECTIVE  Precautions: Limb a Climbing 3-5 steps with a railing?: Total       AM-PAC Score:  Raw Score: 6   Approx Degree of Impairment: 100%   Standardized Score (AM-PAC Scale): 23.55   CMS Modifier (G-Code): CN    FUNCTIONAL ABILITY STATUS  Gait Assessment   Gait Assistance: Not test patient presents with the following impairments cognitive impairments, strength deficits, decreased command following, slow processing speed, balance impairments, poor trunk control, and decreased activity tolerance.  Functional outcome measures completed i

## 2020-04-17 NOTE — CONSULTS
[facility] at [facility]  Report of Consultation    Elan Ardon Patient Status:  Inpatient    10/3/1935 MRN [id number]   [facility] 5NW-A Attending Riky Real MD   [facility] Day # 2 PCP Chelly Salvador glucose (DEX4) oral liquid 30 g, 30 g, Oral, Q15 Min PRN **OR** Glucose-Vitamin C (DEX-4) chewable tab 8 tablet, 8 tablet, Oral, Q15 Min PRN  •  potassium chloride 40 mEq in sodium chloride 0.9% 250 mL IVPB, 40 mEq, Intravenous, Once  •  aspirin chewable t 04/17/2020    CO2 22.0 04/17/2020    GLU 64 04/17/2020    CA 7.1 04/17/2020    ALB 2.1 04/17/2020    ALKPHO 60 04/17/2020    BILT 0.3 04/17/2020    TP 5.1 04/17/2020    AST 14 04/17/2020    ALT 10 04/17/2020    PGLU 102 04/17/2020     Assessment/Plan: abnormality. Increase activity and see if heart rate increases. Will follow.   Rosangela Medrano  4/17/2020  1:01 PM

## 2020-04-17 NOTE — PLAN OF CARE
Problem: Impaired Swallowing  Goal: Minimize aspiration risk  Description  Interventions:  Strict NPO     Outcome: Progressing

## 2020-04-17 NOTE — PROGRESS NOTES
[facility]  Progress Note     Jairo Olivares Patient Status:  Inpatient    10/3/1935 MRN [id number]   [facility] 4SW-A Attending Luis Dorantes MD   Hosp Day # 2 PCP Abi Wong MD     Chief Complaint: hypotension, diarrhea    S: 0.105*            Imaging: Imaging data reviewed in Epic.     Medications:   • aspirin  81 mg Oral Daily   • Memantine HCl  5 mg Oral BID   • famoTIDine  20 mg Oral Daily   • folic acid  1 mg Oral Daily   • Sertraline HCl  25 mg Oral Daily   • Normal Saline patient will require TBD.

## 2020-04-17 NOTE — PLAN OF CARE
Glucose low in AM on cmp, POC check at 59. Orders received from Dr. Gold Albright. Treatment provided with recheck at 181. IVF as ordered D5 0.45 at 100. Discussed further accuchecks with Dr. Gold Albright- will  Monitor glucose today during cares.  For the preservat

## 2020-04-17 NOTE — PROGRESS NOTES
INFECTIOUS DISEASE PROGRESS NOTE    Sophia Colby Patient Status:  Inpatient    10/3/1935 MRN [id number]   [facility] 4SW-A Attending Allan Estrada MD   [address] Day # 2 PCP Angely Madsen 32.0* 32.2*   MCV 95.8 90.4 93.3   MCH 29.4 29.1 29.3   MCHC 30.7* 32.2 31.4   RDW 14.4 14.1 14.0   NEPRELIM 8.63* 3.75 3.02   WBC 12.1* 5.4 4.5   .0* 78.0* 65.0*     Recent Labs   Lab 04/14/20  2120 04/15/20  0617 04/16/20  0607 04/17/20  0643   GL there are positive cases at her memory care unit), but per notes pt with resp symptoms for 2-3 weeks, no hypoxia or resp symptoms now and CXR neg (maybe was \"getting over\" covid 19 infection?)  - remains in iso due to h/o exposure    PLAN:  - cont vanco

## 2020-04-17 NOTE — PROGRESS NOTES
[facility]  Nephrology Progress Note    Kaylen Webster Patient Status:  Inpatient    10/3/1935 MRN [id number]   [facility] 4SW-A Attending Giorgi Fontanez MD   Hosp Day # 2 PCP Abdelrahman Pino MD       SUBJECTIVE:  Remains unchanged clini 04/17/20  0643   ALT 17 10* 10*   AST 32 14* 14*   ALB 3.4 1.9* 2.1*       Recent Labs   Lab 04/14/20  2252 04/17/20  0827 04/17/20  0847   PGLU 120* 59* 181*       Meds:   dextrose 5 %-0.45 % NaCl infusion, , Intravenous, Continuous  glucose (DEX4) oral l

## 2020-04-17 NOTE — PROGRESS NOTES
Palliative Care  Follow up call to son Ember Tavarez. He reports he has received clinical updates from Dr. Noah Gaffney and is aware of her cardiac status with bradycardia and plans for cardiology evaluation and for SLP evaluation today.  He shared that he was asked if

## 2020-04-18 PROCEDURE — 99231 SBSQ HOSP IP/OBS SF/LOW 25: CPT | Performed by: INTERNAL MEDICINE

## 2020-04-18 NOTE — VASCULAR ACCESS
Received consult to place PICC line for LTA pending blood cultures. 72 hour blood cultures to be read today at 1600 per Microbiology department. Spoke with pt's nurse, Frank Duarte who stated that pt currently has functioning midline at this time.

## 2020-04-18 NOTE — PLAN OF CARE
Patient received resting in bed, no non verbal indicators of pain.  All needs anticipated by staff, she is incontinent of bowel and bladder this shift, barrier cream applied, patient repositioned from side to side throughout the night with dependent extr

## 2020-04-18 NOTE — PROGRESS NOTES
Spoke with Peggy Laura from speech therapy, patient can have pureed foods with nectar thick liquids. Advised to keep aspiration precautions and monitor closely for food pocketing and choking. If patient having difficulty, withhold feeding and attempt later.     15

## 2020-04-18 NOTE — PROGRESS NOTES
[facility]  Nephrology Progress Note    [facility] Patient Status:  Inpatient    10/3/1935 MRN [id number]   [facility] 4SW-A Attending Shelli Briceno MD   Hosp Day # 3 PCP Janet Myers MD       SUBJECTIVE:    No acute events overn (69.5 kg)  06/14/19 : 157 lb (71.2 kg)  05/02/19 : 157 lb (71.2 kg)  05/31/16 : 180 lb (81.6 kg)      General: Awake; non-verbal  HEENT: No scleral icterus, MMM  Neck: Supple, no JANIE or thyromegaly  Cardiac: Bradycardic, S1, S2 normal, no murmur or rub  Kirsten

## 2020-04-18 NOTE — PROGRESS NOTES
[facility]  Progress Note     [facility] Patient Status:  Inpatient    10/3/1935 MRN [id number]   [facility] 4SW-A Attending Asia Ontiveros MD   Hosp Day # 3 PCP Binh Shen MD     Chief Complaint: hypotension, diarrhea    S: mL/min (A) (based on SCr of 2.38 mg/dL (H)). No results for input(s): PTP, INR in the last 168 hours. Recent Labs   Lab 04/14/20  2120 04/15/20  0057 04/15/20  0617   TROP 0.118* 0.122* 0.105*            Imaging: Imaging data reviewed in Epic.     Med NH    Plan of care discussed with pt, rn, Courtney Gustafson MD

## 2020-04-18 NOTE — PROGRESS NOTES
[facility]  Cardiology Progress Note    Mukul Macedo Patient Status:  Inpatient    10/3/1935 MRN [id number]   [facility] 5NW-A Attending Roxy Oneal MD   Hosp Day # 3 PCP Drew Larkin MD     A/P:  Sinus bradycardia, asymptomatic Labs:  Lab Results   Component Value Date    WBC 4.3 04/18/2020    RBC 3.40 04/18/2020    HGB 9.9 04/18/2020    HCT 30.8 04/18/2020    MCV 90.6 04/18/2020    MCH 29.1 04/18/2020    MCHC 32.1 04/18/2020    RDW 14.0 04/18/2020    PLT 70.0 04/18/2020 stage 1 through stage 4 chronic kidney disease, or unspecified chronic kidney disease     Edema     Gout     Pseudophakia     UTI (urinary tract infection), bacterial     Vitamin D deficiency, unspecified     Late onset Alzheimer's disease with behavioral

## 2020-04-18 NOTE — SLP NOTE
SPEECH DAILY NOTE - INPATIENT    ASSESSMENT & PLAN   ASSESSMENT  Pt seen this PM for reassessment of swallow at bedside. RN approved session. Pt cooperative, pleasant, and alert. Pt did not make any attempts to vocalize.  Prior to PO trials noted crusted ha during session included: gown, gloves, face shield, surgical mask, hairnet    Diet Recommendations - Solids: Puree  Diet Recommendations - Liquid: Nectar thick    Compensatory Strategies Recommended: No straws; Liquids via spoon; Slow rate; Alternate KAM Norman

## 2020-04-18 NOTE — PROGRESS NOTES
[facility] dosing service    Follow-up Pharmacokinetic Consult for Vancomycin Dosing     Cara Cates is a 81year old female who is being treated for MRSA/UTI. Patient is on day 4 of Vancomycin and is currently receiving 1 gm IV every 36 hours.   Goal [address] Extension: [phone]

## 2020-04-19 PROCEDURE — 99231 SBSQ HOSP IP/OBS SF/LOW 25: CPT | Performed by: INTERNAL MEDICINE

## 2020-04-19 NOTE — PROGRESS NOTES
[facility]  Progress Note     Hawa Coombs Patient Status:  Inpatient    10/3/1935 MRN [id number]   [facility] 4SW-A Attending Kailee Cook MD   Hosp Day # 4 PCP Emmy Leon MD     Chief Complaint: hypotension, diarrhea    S: --   --    BILT 0.4  --  0.2 0.3  --   --    TP 8.6*  --  5.1* 5.1*  --   --     < > = values in this interval not displayed. Estimated Creatinine Clearance: 19.9 mL/min (A) (based on SCr of 1.85 mg/dL (H)).     No results for input(s): PTP, INR in th lovenox  15. Hypernatremia  1. Resolved on 1/2 NS     Quality:  · DVT Prophylaxis: lovenox  · CODE status: full  · Pandey: no     Estimated date of discharge: soon?   Discharge is dependent on: can DC to NH from my standpoint if cleared by ID and neprho  At

## 2020-04-19 NOTE — PLAN OF CARE
Pt is alert , will say a few things hard to understand,  RA 94%. SB 57 presently. Incontinent of band b. Kept dry and clean per staff. PT was fed, pureed and nectar thick. , pt coughed when I tried giving her the waffle, she also seemed to take a while bef

## 2020-04-19 NOTE — PROGRESS NOTES
[facility]  Cardiology Progress Note    Tresa Yanni Patient Status:  Inpatient    10/3/1935 MRN [id number]   [facility] 5NW-A Attending Shania Crouch MD   Hosp Day # 4 PCP Crystal Collier MD     A/P:  Sinus bradycardia, asymptomatic considered high risk for COVID per ID    Laboratory/Data:  Diagnostics:       Labs:     No results found for: PT, INR    Medications:  Enoxaparin Sodium (LOVENOX) 30 MG/0.3ML injection 30 mg, 30 mg, Subcutaneous, Daily  magnesium oxide (MAG-OX) tab 400 mg, Pseudophakia     UTI (urinary tract infection), bacterial     Vitamin D deficiency, unspecified     Late onset Alzheimer's disease with behavioral disturbance ([facility] [address]     Anemia     Thrombocytopenia (HCC)     Azotemia     Altered mental status     Altered me

## 2020-04-19 NOTE — PLAN OF CARE
Patient alert this shift. She is able to participate in physical assessment as well as interact verbally with rn.  She asked about her son Narinder Steinberg by name and asked if he recovered from his knee replacement (RN spoke with son after this conversation and he Swallowing  Goal: Minimize aspiration risk  Outcome: Progressing

## 2020-04-19 NOTE — PROGRESS NOTES
[facility]  Nephrology Progress Note    Recalaurence Vance Patient Status:  Inpatient    10/3/1935 MRN [id number]   [facility] 4SW-A Attending Hector Nino MD   Hosp Day # 4 PCP Blanche Marques MD       SUBJECTIVE:    No acute events overn : 157 lb 6.5 oz (71.4 kg)  08/05/19 : 153 lb 3.2 oz (69.5 kg)  06/14/19 : 157 lb (71.2 kg)  05/02/19 : 157 lb (71.2 kg)  05/31/16 : 180 lb (81.6 kg)      General: Awake; non-verbal  HEENT: No scleral icterus, MMM  Neck: Supple, no JANIE or thyromegaly  Cardi

## 2020-04-20 NOTE — OCCUPATIONAL THERAPY NOTE
OCCUPATIONAL THERAPY EVALUATION - INPATIENT     Room Number: 522/522-A  Evaluation Date: 4/17/2020  Type of Evaluation: Initial  Presenting Problem: AMS, elevated troponin     Physician Order: IP Consult to Occupational Therapy  Reason for Therapy: ADL/IAD With: Staff 24 hours    Toilet and Equipment: Comfort height toilet  Shower/Tub and Equipment: Walk-in shower;Grab bar  Other Equipment: Other (Comment)(W/C, RW )    Occupation/Status: N/A  Hand Dominance: Right  Drives: No       Prior Level of Function: P None                ACTIVITY TOLERANCE  HR 40s-67 with minimal movement       O2 SATURATIONS                ACTIVITIES OF DAILY LIVING ASSESSMENT  AM-PAC ‘6-Clicks’ Inpatient Daily Activity Short Form  How much help from another person does the patient cur troponin. Complete medical history and occupational profile noted above. Functional outcome measures completed include AM-APC.  In this OT evaluation patient presents with the following performance deficits: BADL/IADL dysfunction, decreased endurance, geovanna : Guarded  Frequency (Obs): 3x/week  Number of Visits to Meet Established Goals: 3;Trial    ADL Goals   Patient will perform eating: with min assist  Patient will perform grooming: with min assist    Functional Transfer Goals  Patient will transfer in bed

## 2020-04-20 NOTE — SLP NOTE
SPEECH DAILY NOTE - INPATIENT    ASSESSMENT & PLAN   ASSESSMENT  Pt seen this PM for meal follow up and education session. RN reported pt tolerated diet without concerns of aspiration, RN noted overall deconditioned state with decreased PO intake.  Pt coope Plan/Recommendations: Videofluoroscopic swallow study    Interdisciplinary Communication: Discussed with RN          GOALS  Goal #1 Patient will participate in a re-assessment of swallowing as patient demonstrates improved awareness to accept po per RN rep

## 2020-04-20 NOTE — PROGRESS NOTES
[facility]  Nephrology Progress Note    Elan Duglas Patient Status:  Inpatient    10/3/1935 MRN [id number]   [facility] 4SW-A Attending Riky Real MD   Hosp Day # 5 PCP Vladimir Boone MD       SUBJECTIVE:  Remains unchanged CA 6.8* 6.9* 7.1* 8.0* 8.2* 8.2*   MG 2.1 1.9 1.7  --  1.8 1.8   GLU 90 90 64* 102* 117* 98       Recent Labs   Lab 04/14/20 2120 04/16/20 0607 04/17/20  0643   ALT 17 10* 10*   AST 32 14* 14*   ALB 3.4 1.9* 2.1*       Recent Labs   Lab 04/18/20 2207 MRSA bacteremia- finishing abx     #4. SOB/pneumonia- CXR noted. SARS-COV-2 negative     #5.   Hypokalemia- replace and follow        Dustin Owens MD  4/20/2020  11:57 AM

## 2020-04-20 NOTE — CM/SW NOTE
MSW called and spoke with the patient's son Padilla Gillis about a potential discharge back to [facility] today.   Per Padilla iGllis, if a discharge order is written, he will appeal.  Padillacristiano Gillis informed MSW that the patient has made a significant decline in the past 6 weeks

## 2020-04-20 NOTE — PROGRESS NOTES
[id number] [facility] 149 Follow Up    Jairo Olivares Patient Status:  Inpatient    10/3/1935 MRN [id number]   [facility] 5NW-A Attending Luis Dorantes MD   [facility] Day # 5 PCP Abi Wong MD     Date of visit:  2020  Day 5 of h Level Self-Care Intake Consciousness   100 Full Normal Full   Normal Full   90 Full No disease  Normal Full Normal Full   80 Full Some disease  Normal w/effort Full Normal or  Reduced Full   70 Reduced Some disease  Can't perform job Full Normal or   Reduc Agent's Name: Surrogate is Salo pulido Other Agent's Phone Number: [phone]    Disposition: SNF - Pending placement / per SW. Add community palliative care?     Problem List:       Patient Active Problem List:     DIABETES MELLITUS TYPE II son/HCPOA, Bill today.     HCPOA / HC Surrogate:  Surrogate is Saba pulido Abo [phone]     Disposition: SNF - Pending placement / per SW. Add community palliative care?       A total of 15 minutes were spent on this visit, which included all of the fo

## 2020-04-20 NOTE — PROGRESS NOTES
INFECTIOUS DISEASE PROGRESS NOTE    Libra Rodriguez Patient Status:  Inpatient    10/3/1935 MRN [id number]   [facility] 4SW-A Attending Mayelin Patricia MD   [facility] Day [facility] PCP Raf Tang 04/17/20  0933 04/18/20  0613   RBC 3.54* 3.45* 3.40*   HGB 10.3* 10.1* 9.9*   HCT 32.0* 32.2* 30.8*   MCV 90.4 93.3 90.6   MCH 29.1 29.3 29.1   MCHC 32.2 31.4 32.1   RDW 14.1 14.0 14.0   NEPRELIM 3.75 3.02 2.33   WBC 5.4 4.5 4.3   PLT 78.0* 65.0* 70.0* Does have an abnl UA but US ok and UCx neg  - TTE noted, no vegetations    2. abnl UA/ pyuria- but UCx neg and renal US ok    3. SHWETHA- due to above and vol depletion. better    4. Encephalopathy- due to above superimposed on dementia.  MS a little better

## 2020-04-20 NOTE — PLAN OF CARE
Patient, non responsive to stimuli. Notably agonally breathing. Oral care and tlc provided, will continue to monitor.

## 2020-04-20 NOTE — PLAN OF CARE
Patient received alert and oriented, able to verbalize where she is and why. She is also able to ask if the medication I gave her at hs was namenda ( It was) Patient with increased appetite this shift.  Ate two vanilla icecreams with cues to tuck chin wh Instruct pt to call for assistance with activity based on assessment  - Modify environment to reduce risk of injury  - Provide assistive devices as appropriate  - Consider OT/PT consult to assist with strengthening/mobility  - Encourage toileting schedule

## 2020-04-20 NOTE — PLAN OF CARE
Pt is AAOX2-3, has been non-verbal today, can track with eyes, PO meds with apple sauce, HOB up D/T aspiration risk, speech has seen, recommends keeping Pt on pureed / nectar thick foods, will need video swallow before D/C, VSS, SCD,'s BM today, incontinen

## 2020-04-21 NOTE — VIDEO SWALLOW STUDY NOTE
ADULT VIDEOFLUOROSCOPIC SWALLOWING STUDY    Admission Date: 4/14/2020  Evaluation Date: 04/21/20  Radiologist: Marianne Pat    RECOMMENDATIONS   Diet Recommendations - Solids: Puree  Diet Recommendations - Liquid:  Thin    Further Follow-up:  Follow Up Needed: Yes  S aspiration      Family/Patient Goals: To eat and drink safely     ASSESSMENT   DYSPHAGIA ASSESSMENT  Test completed in conjunction with Radiologist.  Patient Positioned: Upright;CHAS chair. Patient Viewed: Lateral.  Patient Alertness: Fully alert.   Consis Impaired  Mastication (VFSS - Puree): Impaired  Retention (VFSS - Puree):  Intact  Triggered at: Valleculae  Premature Spillage to: Valleculae  Residue Severity, Location: Trace;Valleculae;Pyriform sinuses  Cleared/Reduced with: Secondary swallow  Laryngeal patient/family/caregiver will demonstrate understanding and implementation of aspiration precautions and swallow strategies independently over 1-2 session(s).     Not Addressed     PLAN: To eat and drink safely    EDUCATION/INSTRUCTION  Reviewed results and

## 2020-04-21 NOTE — PROGRESS NOTES
[facility]  Progress Note     Shae Richardson Patient Status:  Inpatient    10/3/1935 MRN [id number]   [facility] 4SW-A Attending Bonilla Bullard MD   Hosp Day # 5 PCP Stephen Hickey MD     Chief Complaint: hypotension, diarrhea    S: AST 32  --  14* 14*  --   --   --   --    ALT 17  --  10* 10*  --   --   --   --    BILT 0.4  --  0.2 0.3  --   --   --   --    TP 8.6*  --  5.1* 5.1*  --   --   --   --     < > = values in this interval not displayed.        Estimated Creatinine Clearanc off  3. monitoir  10. HTN  11. DM  1. Hypoglycemic  12. GERD  13. Dementia  14. Thrombocytopenia  1. Stable  2. HIPA neg  3. Restart lovenox  15. Hypernatremia  1.  Resolved on 1/2 NS     Quality:  · DVT Prophylaxis: lovenox  · CODE status: full  · Pandey: n

## 2020-04-21 NOTE — PALLIATIVE CARE NOTE
Chart reviewed noting DC planning is pending per LACEY. VFSS this AM.    Unclear if pt currently already has community palliative services arranged on DC or if interested in setting up after this hospital stay.  As of the time of this note's filing, have not

## 2020-04-21 NOTE — PROGRESS NOTES
INFECTIOUS DISEASE PROGRESS NOTE    Sophia Colby Patient Status:  Inpatient    10/3/1935 MRN [id number]   [facility] 4SW-A Attending Allan Estrada MD   [address] [facility] Day # 6 PCP Angely Madsen Systems:    Unable to obtain    Physical Exam:    Chart reviewed, d/w other MDs. Pt not examined as trying to preserve PPE.     Laboratory Data:    Recent Labs   Lab 04/16/20  0610 04/17/20  0933 04/18/20  0613 04/21/20  1651   RBC 3.54* 3.45* 3.40*  -- 04/19/20  2:19 PM   Result Value Ref Range    Blood Culture Result No Growth 2 Days N/A   2. URINE CULTURE, ROUTINE     Status: None    Collection Time: 04/14/20  8:05 PM   Result Value Ref Range    Urine Culture No Growth at 18-24 hrs.  N/A         Radiolo

## 2020-04-21 NOTE — CM/SW NOTE
Care Progression Note:  Active Acute Medical Issue:   82 y/o female with MRSA Septic shock, recent PNA,   SHWETHA on CKD III, improving  Encephalopathy, improved  bradycardia  COVID-19 negative by PCR  U/s arms pending due to pitting edema today    Other Contr

## 2020-04-21 NOTE — PLAN OF CARE
Patient alert and oriented x 1, able to follow simple commands but non-verbal this morning. Both upper arm with non-pitting edema, Dr. Monique Rincon aware, [address] [facility] ordered.   Video Swallow done this morning, per ST patient  is risk for aspiration given fluctuating m

## 2020-04-21 NOTE — PALLIATIVE CARE NOTE
Pt's son Frannie Pickering returned my call advising that he has placed calls to [employer] today alone. Attempting to reach leader, Addi Regalado - Admissions Director at [place]. No answer at any numbers for nursing. No return phone calls. Reviewed DC plan with Moses.  He stated

## 2020-04-21 NOTE — PROGRESS NOTES
[facility]  Nephrology Progress Note    Zhannaantony Villafuerte Patient Status:  Inpatient    10/3/1935 MRN [id number]   [facility] 4SW-A Attending Asia Ontiveros MD   Hosp Day # 6 PCP Binh Shen MD       SUBJECTIVE:    No acute events overn 1017  Gross per 24 hour   Intake 280 ml   Output —   Net 280 ml     Wt Readings from Last 6 Encounters:  04/16/20 : 157 lb 6.5 oz (71.4 kg)  08/05/19 : 153 lb 3.2 oz (69.5 kg)  06/14/19 : 157 lb (71.2 kg)  05/02/19 : 157 lb (71.2 kg)  05/31/16 : 180 lb (81

## 2020-04-21 NOTE — PLAN OF CARE
Problem: PAIN - ADULT  Goal: Verbalizes/displays adequate comfort level or patient's stated pain goal  Description  INTERVENTIONS:  - Encourage pt to monitor pain and request assistance  - Assess pain using appropriate pain scale  - Administer analgesics Hypoglycemia protocol followed. On scheduled IV antibiotics for MRSA of the blood. POC: patient will go home with IV Midline to continue IV antibiotics as outpatient . For Video swallow in the morning.  Patient on strict nectar thickened liquid and pureed d

## 2020-04-22 NOTE — PROGRESS NOTES
120 [facility] dosing service    Follow-up Pharmacokinetic Consult for Vancomycin Dosing     Beth Ceja is a 81year old female who is being treated for MRSA/UTI. Patient is on day 7 of Vancomycin and is currently receiving 1 gm IV Q36h.   Goal trough is need Scr daily while on Vancomycin to assess renal function. 4.  Pharmacy will follow and adjust as necessary. We appreciate the opportunity to assist in her care.     Jean Carlos De León, PharmD  4/21/2020  7:33 PM  [address] [place] [address] Extension: [phone]

## 2020-04-22 NOTE — PLAN OF CARE
Pt confused. Alert to self, hx of dementia. On room air at 96%. Tele monitor on. SCDs & bunny boots to BLE. Pt tolerating puree diet with thin liquids. QID Accu-checks. Insulin not given due to parameters not being met. Pt incontinent. Brief in place.  Last

## 2020-04-22 NOTE — CM/SW NOTE
Discussed pt with RN. No plans to dc today. Dr. Willi Manuel to call son. SW spoke to [facility] from [place] to see if [place] spoke to son to address his concerns. [facility] will reach out to son. Await medical clearance to dc.   RN states there has been some

## 2020-04-22 NOTE — PLAN OF CARE
Alert to person x1, K+ level today =4.3, tolerated small amt of breakfast- poor appetite, will drink Ensure, Vanco ordered q 48 hrs, repositioned in bed, BLE edema noted- warm packs placed prn,  to spoke w/ Mike Garland on d/c , son Edel Loretta to be updated

## 2020-04-22 NOTE — PLAN OF CARE
PT/OT eval ordered for am,speech therapy to re-eval in am,  IVF dc'd per Dr. Marva Laboy, Amlodipine dose increased to 5 mg ,son Earnestine Cox updated on plan of care.

## 2020-04-22 NOTE — PROGRESS NOTES
INFECTIOUS DISEASE PROGRESS NOTE    Marilyn Mcghee Patient Status:  Inpatient    10/3/1935 MRN [id number]   [facility] 4SW-A Attending Desiree Hoskins MD   [address] Day # 7 PCP Karina Bowser Systems:    Unable to obtain    Physical Exam:    Chart reviewed, d/w other MDs and with RN. Pt not examined as trying to preserve PPE.     Laboratory Data:    Recent Labs   Lab 04/16/20  0610 04/17/20  0933 04/18/20  0613 04/21/20  1651   RBC 3.54* 3.45* 3 renal US reviewed    ASSESSMENT:    1. MRSA septic shock  - No obvious source of infection on exam. No obvious cellulitis or septic arthritis. No prosthetic valves or central lines on admission.  Does have an abnl UA but US ok and UCx neg  - TTE noted, no v

## 2020-04-22 NOTE — PROGRESS NOTES
[facility]  Progress Note     Lalo Larry Patient Status:  Inpatient    10/3/1935 MRN [id number]   [facility] 4SW-A Attending Reza Martinez MD   Hosp Day # 6 PCP Esperanza Perez MD     Chief Complaint: hypotension, diarrhea    S: --   --   --   --   --    BILT 0.2 0.3  --   --   --   --   --    TP 5.1* 5.1*  --   --   --   --   --     < > = values in this interval not displayed. Estimated Creatinine Clearance: 20.4 mL/min (A) (based on SCr of 1.8 mg/dL (H)).     No results for off  3. monitoir  11. HTN  12. DM  1. Hypoglycemic  13. GERD  14. Dementia  15. Thrombocytopenia  1. Stable  2. HIPA neg  3. Restart lovenox  16. Hypernatremia  1. Started back on . 39 NS today      Quality:  · DVT Prophylaxis: lovenox  · CODE status: full

## 2020-04-22 NOTE — PROGRESS NOTES
[facility]  Progress Note     Jackelin Johnson Patient Status:  Inpatient    10/3/1935 MRN [id number]   [facility] 5NW-A Attending Shanice [facility] Day # 7 PCP Kendrick Wiggins MD     Chief Complaint: diarrhea    S: Patie 118*  --  122* 118*   CO2 25.0 22.0   < > 21.0  --  24.0 21.0   ALKPHO 60 60  --   --   --   --   --    AST 14* 14*  --   --   --   --   --    ALT 10* 10*  --   --   --   --   --    BILT 0.2 0.3  --   --   --   --   --    TP 5.1* 5.1*  --   --   --   -- regardless  3. Echo unremarkable. No WMA, no vegetations  9. Diarrhea  1. c Diff neg  10. Acidosis  1. Due to SHWETHA  2. Resolving  3. monitoir  11. HTN  12. DM  1. Hypoglycemic  13. GERD  14. Dementia  15. Thrombocytopenia  1. Stable  2. HIPA neg  3.  Restart

## 2020-04-22 NOTE — PROGRESS NOTES
[facility]  Nephrology Progress Note    Carole Howe Patient Status:  Inpatient    10/3/1935 MRN [id number]   [facility] 4SW-A Attending Tom Bui MD   Hosp Day # 7 PCP Sara Vasquez MD       SUBJECTIVE:  Stable this AM        P --  23* 21*   CREATSERUM 3.92* 2.99* 2.38* 1.85* 1.56*  --  1.80*  1.80* 1.58*  1.58*   CA 6.9* 7.1* 8.0* 8.2* 8.2*  --  8.1* 7.9*   MG 1.9 1.7  --  1.8 1.8  --   --  1.7   GLU 90 64* 102* 117* 98  --  84 76       Recent Labs   Lab 04/16/20  0607 04/17/20 illness, diarrhea.  stable and approaching baseline. Cont IVF    #2. Hypernatremia-stable. Continue half-normal saline with ongoing poor intake     #3. MRSA bacteremia- finishing abx     #4. SOB/pneumonia- CXR noted.   SARS-COV-2 negative

## 2020-04-22 NOTE — SLP NOTE
Attempted to see pt for speech therapy services- RN reported increased lethargy today. Will follow up as scheduling permits. Thank you.     Oleg Alvarado M.S. [address] [facility]  Pager [phone]

## 2020-04-23 NOTE — PROGRESS NOTES
[facility]  Nephrology Progress Note    Ritchie Henderson Patient Status:  Inpatient    10/3/1935 MRN [id number]   [facility] 4SW-A Attending Alida Burgos MD   Hosp Day # 8 PCP Derrick Ortiz MD       SUBJECTIVE:  Notes reviewed, more al CREATSERUM 2.99*   < > 1.85* 1.56*  --  1.80*  1.80* 1.58*  1.58* 1.68*  1.68*   CA 7.1*   < > 8.2* 8.2*  --  8.1* 7.9* 8.0*   MG 1.7  --  1.8 1.8  --   --  1.7  --    GLU 64*   < > 117* 98  --  84 76 84    < > = values in this interval not displayed. baseline. D/c IVF    #2. Hypernatremia-stable. Encourage water intake     #3. MRSA bacteremia- finishing abx     #4. SOB/pneumonia- CXR noted.   SARS-COV-2 negative       OK for d/c from renal standpoint    Baron Lonny MD  4/23/2020  12:18 PM

## 2020-04-23 NOTE — CM/SW NOTE
SW spoke to RN this am.  ST to see pt. SW spoke to son Karma Forbes. He has been in conversation with Unique Costello and feels his questions have been answered. Await medical clearance for dc.     Kade Ramirez LCSW  /Discharge Planner  [phone]

## 2020-04-23 NOTE — SLP NOTE
SPEECH DAILY NOTE - INPATIENT    ASSESSMENT & PLAN   ASSESSMENT  Pt seen this AM for meal follow up and education session per RN request. RN reported pt alert but demonstrated increased pocketing and spitting out bolus with medications crushed in pureed.  P Communication: Discussed with RN    GOALS  Goal #1 The patient will tolerate pureed consistency and thin liquids without overt signs or symptoms of aspiration with 100 % accuracy over 1-2 session(s).   Met   Goal #2 The patient/family/caregiver will demonst

## 2020-04-23 NOTE — PLAN OF CARE
Patient was alert this AM, oriented to person, place and time. Disoriented to situation. Delayed responses. Follows commands. RA, . Tele, NSR. VSS. Denies any difficulties breathing. Dry cough noted. Midline flushed, dressing remains c/d/I.  Right arm pr Administer growth factors as ordered  - Implement neutropenic guidelines  Outcome: Progressing     Problem: SAFETY ADULT - FALL  Goal: Free from fall injury  Description  INTERVENTIONS:  - Assess pt frequently for physical needs  - Identify cognitive and p Recommend use of total lift for transfers   Outcome: Progressing     Problem: Impaired Swallowing  Goal: Minimize aspiration risk  Outcome: Progressing     Problem: Impaired Activities of Daily Living  Goal: Achieve highest/safest level of independence in

## 2020-04-23 NOTE — PROGRESS NOTES
[facility]  Progress Note     Kendal Lyons Patient Status:  Inpatient    10/3/1935 MRN [id number]   [facility] 5NW-A Attending Lucina Hernandez [facility] Day # 8 PCP Hannah Lomeli MD     Chief Complaint: diarrhea    S: Patie BILT 0.3  --   --   --   --    TP 5.1*  --   --   --   --     < > = values in this interval not displayed. Estimated Creatinine Clearance: 21.9 mL/min (A) (based on SCr of 1.68 mg/dL (H)). No results for input(s): PTP, INR in the last 168 hours. SHWETHA  2. Resolving  3. monitoir  11. HTN  12. DM  1. Hypoglycemic  13. GERD  14. Dementia  15. Thrombocytopenia  1. Stable  2. HIPA neg  3. Restart lovenox  16. Hypernatremia  1. Resolved    Plan of care: As above. Cleared for discharge today.  Family appeal

## 2020-04-23 NOTE — PROGRESS NOTES
Attempted to give patient medications crushed in applesauce. Patient was not able to tolerate, coughing/gagging and patient spit up all of the applesauce. Speech therapy paged.

## 2020-04-23 NOTE — OCCUPATIONAL THERAPY NOTE
Duplicate OT order received, chart reviewed. Pt was seen on 4/17 for OT evaluation with recommendation of DIANE trial given. For judicious use of PPE in this COVID-19 pandemic, OT will continue to monitor pt peripherally.      Thank you for allowing me to car

## 2020-04-23 NOTE — CM/SW NOTE
LACEY spoke to son today to discuss plans to dc his mother back to [facility]. He now states he wants to appeal discharge because he thinks Savanna Staton is disorganized.  He states when he called there today he found out that Savanna Staton may have lost her dentures a

## 2020-04-23 NOTE — PHYSICAL THERAPY NOTE
Duplicate PT order placed by RN on 4/22. Pt was evaluated by PT during this admission with rec for DIANE. Will continue to follow peripherally per COVID protocol.

## 2020-04-23 NOTE — PROGRESS NOTES
INFECTIOUS DISEASE PROGRESS NOTE    Carole  Patient Status:  Inpatient    10/3/1935 MRN [id number]   [facility] 4SW-A Attending Tom Bui MD   [facility] Day # 8 ARACELIS Vasquez obtain    Physical Exam:    Chart reviewed, d/w other MDs and with RN. Pt not examined as trying to preserve PPE.     Laboratory Data:    Recent Labs   Lab 04/17/20  0933 04/18/20  0613 04/21/20  1651   RBC 3.45* 3.40*  --    HGB 10.1* 9.9*  --    HCT 32.2* vegetations    2. abnl UA/ pyuria- but UCx neg and renal US ok    3. SHWETHA- due to above and vol depletion. better    4. Encephalopathy- due to above superimposed on dementia.  MS domenic waxes and wanes    5. covid exposure  -  her roommate was + (and we know that

## 2020-04-23 NOTE — PLAN OF CARE
Pt confused. Non-verbal. Unable to follow commands. BUE and BLE non-pitting edema noted. Pt doesn't seem to be in pain or resp distress. RA maintained all night. Afebrile. Every 3-4 hr repositioning done. Groin and buttocks area are red.  Radha care done, cr

## 2020-04-24 NOTE — PHYSICAL THERAPY NOTE
PHYSICAL THERAPY TREATMENT NOTE - INPATIENT    Room Number: 522/522-A     Session: 1  Number of Visits to Meet Established Goals: 4    Presenting Problem: AMS, sepsis   History related to current admission: Pt is a 81 y.o. female admitted 4/14/20 with AMS Static Sitting: Fair -  Dynamic Sitting: Poor +           Static Standing: Poor -  Dynamic Standing: Poor -    ACTIVITY TOLERANCE                         O2 WALK                    AM-PAC '6-Clicks' INPATIENT SHORT FORM - Pt left with call light in reach. RN aware.      THERAPEUTIC EXERCISES  Lower Extremity Ankle pumps  Quad sets     Upper Extremity      Position Supine     Repetitions   10   Sets   1     Patient End of Session: Up in chair;Needs met;Call light within reach Stand at assistance level: maximum assistance- MET  NEW Pt will transfer to bedside chair with MOD assist      Goal #3 Patient is able to sit at EOB for 3 min with MOD assist. - MET      Goal #4     Goal #5     Goal #6     Goal Comments: Goals established

## 2020-04-24 NOTE — OCCUPATIONAL THERAPY NOTE
OCCUPATIONAL THERAPY TREATMENT NOTE - INPATIENT     Room Number: 522/522-A  Session: 1  Number of Visits to Meet Established Goals: 3;Trial    Presenting Problem: AMS, elevated troponin     History related to current admission:  Pt is 81year old female ad denies        ACTIVITY TOLERANCE                         O2 SATURATIONS                ACTIVITIES OF DAILY LIVING ASSESSMENT  AM-PAC ‘6-Clicks’ Inpatient Daily Activity Short Form  How much help from another person does the patient currently need…  -   Put reach;RN aware of session/findings; All patient questions and concerns addressed; Alarm set;SCDs in place    ASSESSMENT   Patient w/ improved activity tolerance and command following this session.   Patient remains at mod to total assist for ADLs and function

## 2020-04-24 NOTE — PROGRESS NOTES
[facility] dosing service    Follow-up Pharmacokinetic Consult for Vancomycin Dosing     Sophia Colby is a 81year old female who is being treated for MRSA septic shock. Patient is on day 10 of Vancomycin and is currently receiving 1 gm IV Q 48 hours. the opportunity to assist in her care.     Sujata Domingo, PharmD  4/24/2020  3:40 AM  [facility]: [phone]

## 2020-04-24 NOTE — CM/SW NOTE
MSW spoke with the patient's son Jovi Bullock by phone to discuss the current status of the appeal and discuss potential discharge scenarios.   Per Jovi Bullock, he would like the patient to stay here at [facility] and would be fine with the discharge tomorrow regardle

## 2020-04-24 NOTE — PROGRESS NOTES
[facility]  Progress Note     Ritchie Henderson Patient Status:  Inpatient    10/3/1935 MRN [id number]   [facility] [facility] [place] 5NW-A Attending [facility] Day # 9 PCP Derrick Ortiz MD     Chief Complaint: diarrhea    S: Patie TROP, CK in the last 168 hours. Imaging: Imaging data reviewed in Epic.     Medications:   • amLODIPine Besylate  5 mg Oral Daily   • Donepezil HCl  10 mg Oral Nightly   • vancomycin  15 mg/kg Intravenous Q48H   • enoxaparin  30 mg Subcutaneous Sung Lovenox  · CODE status: Full  · Pandey: N/A  · Central line: N/A    Will the patient be referred to TCC on discharge?: No  Estimated date of discharge: next 24 hours  Discharge is dependent on: clinical stability  At this point Ms. Julianne Serra is expected to be

## 2020-04-24 NOTE — PROGRESS NOTES
[facility]  Nephrology Progress Note    Chaya Desai Patient Status:  Inpatient    10/3/1935 MRN [id number]   [facility] 4SW-A Attending Liam Dalton MD   Hosp Day # 9 PCP Macario Simon MD       SUBJECTIVE:  Stable this AM      Phy 8. 1* 7.9* 8.0*  --    MG 1.8 1.8  --   --  1.7  --   --    * 98  --  84 76 84  --        No results for input(s): ALT, AST, ALB, AMYLASE, LIPASE, LDH in the last 168 hours.     Invalid input(s): ALPHOS, TBIL, DBIL, TPROT    Recent Labs   Lab 04/23/20 abx     #4. SOB/pneumonia- CXR noted.   SARS-COV-2 negative       OK for d/c from renal standpoint        Lottie Sunshine MD  4/24/2020  10:17 AM

## 2020-04-24 NOTE — PLAN OF CARE
Pt AOX1. Mostly non-verbal all t/o the shift. Unable to follow commands. However almost at the end of the shift pt able to answer yes/no. She was able to tell staff what she wanted to eat for breakfast. Does not seem to be in pain or respiratory distress.

## 2020-04-24 NOTE — CM/SW NOTE
MSW checked on the status of the appeal.  It is still in clinical review. SW will continue to monitor.     Gavi Narvaez LCSW

## 2020-04-24 NOTE — PROGRESS NOTES
INFECTIOUS DISEASE PROGRESS NOTE    Cara Cates Patient Status:  Inpatient    10/3/1935 MRN [id number]   [facility] 4SW-A Attending Lorin MD Fines[place]   [facility] Day # 9 ARACELIS De Los Santos not examined as trying to preserve PPE.     Laboratory Data:    Recent Labs   Lab 04/18/20  0613 04/21/20  1651   RBC 3.40*  --    HGB 9.9*  --    HCT 30.8*  --    MCV 90.6  --    MCH 29.1  --    MCHC 32.1  --    RDW 14.0  --    NEPRELIM 2.33  --    WBC 4.3 19 infection?)  - remains in iso due to h/o exposure    PLAN:    - cont vanco, pharmacy dosing. Level noted. - new BCx remain neg  - pt from facility with outbreak.  Would keep on iso while here  - follow MS and resp status  - will need at least 2 weeks o

## 2020-04-24 NOTE — PLAN OF CARE
Pt A&Ox1, on ra  & tele in place. Tolerating diet, pt not having much of appetite today, drinking plenty of fluids, tolerating thin liquids. Pt sitting up in chair at this time, worked with pt/ot today.  Radha area sacrum pink in color, mepilex & zinc oin

## 2020-04-25 NOTE — PROGRESS NOTES
Pt. On mod. High back rest , easily arousable to verbal and tactile stimuli. Resp. Pattern easy and non labored. Tele shows SB on the monitor at 40's. Pt. Upper and lower extremeties very swollen , elevated on the pillows.  Denies any pain or dis

## 2020-04-25 NOTE — CM/SW NOTE
SW received call from son. He received call that he lost dc appeal and pt needs to dc by noon tomorrow. Explained if pt still has medical reason to stay we do not have to abide by that dc timeframe.     Asia Joyce LCSW  /Discharge Planner

## 2020-04-25 NOTE — PROGRESS NOTES
[facility]  Nephrology Progress Note    Kendal Hernandez Attending:  Lucina Lucas*       Assessment and Plan:    1) SHWETHA on CKD 3- baseline Cr < 1.5 mg/dl due to HTN / nephrosclerosis;  SHWETHA due to acute illness / diarrhea- improved / stable Pen (NOVOLOG) 100 UNIT/ML flexpen 1-5 Units, 1-5 Units, Subcutaneous, TID CC and HS  glucose (DEX4) oral liquid 15 g, 15 g, Oral, Q15 Min PRN    Or  Glucose-Vitamin C (DEX-4) chewable tab 4 tablet, 4 tablet, Oral, Q15 Min PRN    Or  dextrose 50 % injection

## 2020-04-25 NOTE — PROGRESS NOTES
AWAKE,NON VERBAL AT THIS TIME,DOESNT APPEAR TO BE IN PAIN. NO SHORTNESS OF BREATH NOTED. HOB 30 DEGREES. WITH GENERALIZED EDEMA KAYLA BOTH ARMS --ELEVATED ON PILLOWS.BRUISING NOTED ON ARMS KAYLA LEFT SIDE. RIGHT ARM MIDLINE C/D/I,FLUSHED. WITH POOR APPETITE,ASSISTE

## 2020-04-25 NOTE — CM/SW NOTE
Rn states Dr. Regina Moody wants pt to be changed to IV dapto and ordering CT scan. LACEY sent ID note to [facility] via [place] and spoke to Isela at [facility]. Isela states they are ok with IV dapto. They prefer not to do late admission tonight. LACEY updated RN.     Laura Laughlin

## 2020-04-25 NOTE — CM/SW NOTE
RN asked sw to put dc on hold due to rash. ID needs to see her. Tariq Luna on hold- son aware. Paulino Singh updated.     Estrella Trevizo LCSW  /Discharge Planner  [phone]

## 2020-04-25 NOTE — PROGRESS NOTES
[facility]                INFECTIOUS DISEASE PROGRESS NOTE    Jairo Olivares Patient Status:  Inpatient    10/3/1935 MRN [id number]   [facility] 5NW-A Attending Horacio Werner [facility] Day # 10 PCP Abi Wong MD     An 1.  BLOOD CULTURE     Status: None    Collection Time: 04/19/20  2:19 PM   Result Value Ref Range    Blood Culture Result No Growth 5 Days N/A   2. URINE CULTURE, ROUTINE     Status: None    Collection Time: 04/14/20  8:05 PM   Result Value Ref Range    [facility]  [phone]

## 2020-04-26 NOTE — PLAN OF CARE
Assumed care at [facility]. Pt a/ox1. Mostly nonverbal per baseline. VSS. NSR/SB per tele. RA, . Denies pain. Pt w/ swollen upper and lower extremities, elevated on pillows. Turned and repositioned for comfort. Rash on groin area and back noted.   Meds gi

## 2020-04-26 NOTE — PROGRESS NOTES
[facility]  Progress Note     Cara Darryn Patient Status:  Inpatient    10/3/1935 MRN [id number]   [facility] 5NW-A Attending [address]elly [address] [address] [address] Day # 6 PCP Makayla De Los Santos MD     Chief Complaint: diarrhea    S: Tiffany mL/min (A) (based on SCr of 1.66 mg/dL (H)). No results for input(s): PTP, INR in the last 168 hours. No results for input(s): TROP, CK in the last 168 hours. Imaging: Imaging data reviewed in Epic.     Medications:   • DAPTOmycin  500 mg Intr Family appealing discharge for nursing home to get things in order. Pt to D/C if appeal is denied.     Quality:  · DVT Prophylaxis: Lovenox  · CODE status: Full  · Pandey: N/A  · Central line: N/A    Will the patient be referred to TCC on discharge?: No  Est

## 2020-04-26 NOTE — PROGRESS NOTES
[facility]  Progress Note     Ronald Hernandez Patient Status:  Inpatient    10/3/1935 MRN [id number]   [facility] 5NW-A Attending René CortezLILO [facility] Day # 6 PCP Bryan Harrison MD     Chief Complaint: diarrhea    S: Tiffany 122* 118* 115*  --   --   --    CO2 24.0 21.0 23.0  --   --   --        Estimated Creatinine Clearance: 22.2 mL/min (A) (based on SCr of 1.66 mg/dL (H)). No results for input(s): PTP, INR in the last 168 hours.     No results for input(s): TROP, CK in th SHWETHA  2. Resolving  3. monitoir  11. HTN  12. DM  1. Hypoglycemic  13. GERD  14. Dementia  15. Thrombocytopenia  1. Stable  2. HIPA neg  3. Restart lovenox  16. Hypernatremia  1. Resolved    Plan of care: As above.  Cleared for discharge when we know if nurs

## 2020-04-26 NOTE — PROGRESS NOTES
1915--CT SCAN RESULTS RELAYED TO DR. KIRK  1920--DR. KIRK NOTIFIED OF PATIENT'S LOW APPETITE AND LATEST ACCUCHECK =76.

## 2020-04-26 NOTE — CM/SW NOTE
LACEY received a call from RN that pt is ready for discharged. SW contacted [facility]  P:[phone]. SW spoke to Jimi, Kori Kitchen who confirms that pt can return today.     RN to call [facility] (NK:[phone]) to [employer]

## 2020-04-26 NOTE — PROGRESS NOTES
Assumed care of patient at 0730. Patient alert, unable to assess orientation. Mostly non verbal.  Nodded appropriately. On room air, O2 saturations %. Denied pain.   Petichial rash observed on back (very diffuse), in groin area and sparsely on arm

## 2020-04-29 NOTE — DISCHARGE SUMMARY
[facility]  DISCHARGE SUMMARY     Sophia Colby Patient Status:  Inpatient    10/3/1935 MRN [id number]   [facility] 5NW-A Attending No att. providers found   [address] Day # 11 PCP Angely Madsen MD     Date of Admission: 2020  Date fraction so ideology of the sinus bradycardia was unclear. Bradycardia resolved and cardiology signed off. Repeat blood cultures came back negative so patient had a PICC line placed.   Patient's vitals have stabilized and was cleared for discharge back to taking these medications      Instructions Prescription details   Acetaminophen 500 MG Caps      Take 500 mg by mouth nightly.    Refills:  0     Tylenol Extra Strength 500 MG Tabs  Generic drug:  acetaminophen      Take 500 mg by mouth every 8 (eight) hour Refills:  0        STOP taking these medications    RisaQuad Caps              Where to Get Your Medications      These medications were sent to [facility], [address] [facility] [address]

## 2020-04-29 NOTE — CM/SW NOTE
04/26/20 1017   Discharge disposition   Expected discharge disposition Skilled Nurs   Name of Pop Santos   Discharge transportation [facility]     dc'd 4/26

## 2020-06-13 NOTE — ED INITIAL ASSESSMENT (HPI)
Patient here with c/o AMS. Patient's afternoon nurse came on at [facility] and was found to be fatigued and nonverbal.  Last known well unknown.   Patient has dementia but is normally verbal.

## 2020-06-13 NOTE — ED PROVIDER NOTES
Patient Seen in: [facility] Emergency Department      History   Patient presents with:  Altered Mental Status    Stated Complaint: ams    HPI    This is an 44-year-old female DNR, resident of [address] [place] [address] who presents with altered mental statu Smoking status: Never Smoker      Smokeless tobacco: Never Used    Alcohol use: Not Currently      Comment: rarely    Drug use: Never             Review of Systems    Positive for stated complaint: ams  Other systems are as noted in HPI.   Constitutional an normal limits   URINALYSIS WITH CULTURE REFLEX - Abnormal; Notable for the following components:    Clarity Urine Hazy (*)     Blood Urine Small (*)     Leukocyte Esterase Urine Large (*)     WBC Urine >50 (*)     RBC URINE 3-5 (*)     Bacteria Urine Rare information. FINDINGS:  VENTRICLES/SULCI:  Stable symmetric prominence of ventricular system and cortical sulci. INTRACRANIAL:  Stable low attenuation of cerebral white matter consistent with chronic small vessel ischemic changes.   Stable tiny old lacun enlargement. AORTA/VASCULAR:    Atherosclerosis. No aneurysm. RETROPERITONEUM:  No mass or adenopathy. BOWEL/MESENTERY:  Uncomplicated diverticula of sigmoid colon. Loop of transverse colon within large umbilical hernia without obstruction.   No bowel d scan abdomen/pelvis demonstrated large umbilical hernia without incarceration or obstruction. Uncomplicated sigmoid diverticula.     Previous CT scan abdomen on 4/25/2020 demonstrated an 11 x 4 x 5.3 cm ventral hernia containing a nondilated loop of transv

## 2020-06-13 NOTE — ED NOTES
Report given to [facility] at [facility]. No questions or concerns and aware patient will be discharged back to the nursing facility.

## 2020-07-04 NOTE — ED INITIAL ASSESSMENT (HPI)
Patient brought in by EMS after falling out of bed while being cleaned up, patient fell approximately 3-4 feet. Large hematoma above right eye, abrasion noted to bridge of nose.  Patient at baseline per EMS, bird madsen, coming from memory unit at [facility]

## 2020-07-04 NOTE — ED PROVIDER NOTES
Patient Seen in: [facility] Emergency Department      History   Patient presents with:  Fall    Stated Complaint: fall, on thinners    HPI    78-year-old white female who is brought to the emergency room today for complaint of fall.   Patient apparen abrasion noted across the nasal bridge. There is some bruising noted along the angle of mandible down right side of her neck. No mandibular tenderness is noted. Conjunctiva is clear. Sclerae anicteric.   Patient is in a cervical collar and this was left hydrocephalus. 2. Mild chronic small vessel ischemic disease with small chronic infarcts within the head of the right caudate and right thalamus.  If there is clinical concern for acute ischemia/infarction, an MRI of the brain would be recommended for furt is a lucency across the lateral margin of the humeral head which is favored to be projectional, however, if the patient's symptoms persist or worsen, MRI would be suggested for   further assessment.   2. 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

## 2020-07-21 PROBLEM — N17.9 AKI (ACUTE KIDNEY INJURY) (HCC): Status: ACTIVE | Noted: 2020-01-01

## 2020-07-21 PROBLEM — A41.9 SEPSIS DUE TO UNDETERMINED ORGANISM (HCC): Status: ACTIVE | Noted: 2020-01-01

## 2020-07-21 PROBLEM — R40.0 SOMNOLENCE: Status: ACTIVE | Noted: 2020-01-01

## 2020-07-21 NOTE — ED PROVIDER NOTES
Patient Seen in: [facility] Emergency Department      History   Patient presents with:  Abnormal Labs    Stated Complaint:     HPI    History obtained from EMS nursing in the chart, the patient has a history dementia and is acutely altered is not ab to pain and to verbal stimuli, will wake up, look around is not speaking. Head: Normocephalic and atraumatic. Eyes: EOM are normal. Pupils are equal, round, and reactive to light. Neck: Normal range of motion. Neck supple. No JVD present.      Cardio DIFFERENTIAL - Abnormal; Notable for the following components:    Neutrophil Absolute Manual 25.38 (*)     Lymphocyte Absolute Manual 0.26 (*)     All other components within normal limits   LACTIC ACID, PLASMA - Abnormal; Notable for the following compone There is decrease in the interstitial changes in the lingular segment and left lower lobe and slight decrease in the left effusion.     Dictated by: China Carrillo MD on 7/21/2020 at 12:06 PM     Finalized by: China Carrillo MD on 7/21/2020 at 12:09 vital signs and cardiovascular instability due to her altered mental status, urosepsis. This involved direct patient intervention, complex decision making, and/or extensive discussions with the patient, family, and clinical staff.           Disposition and

## 2020-07-21 NOTE — PLAN OF CARE
Patient received from ED around 1730, Awake but confused, on 2LNC, levophed ongoing. Placed in bed comfortably, Monitored vital signs, Levophed increased to 10, BLadder scan 197. Kept npo, pure wick inserted. Initited covid isolation, patient from [facility].

## 2020-07-21 NOTE — CONSULTS
30 [facility] Patient Status:  Inpatient    10/3/1935 MRN [id number]   [facility] 4SW-A Attending Belkys Laguna MD   Hosp Day # 0 PCP Bryan Harirson MD     Date of Admission: 2020  Admission Diagnosis: Somnolence [ Rfl:   famotidine 40 MG Oral Tab, Take 40 mg by mouth nightly., Disp: , Rfl:   mirtazapine 15 MG Oral Tab, Take 15 mg by mouth nightly., Disp: , Rfl:   amLODIPine Besylate 5 MG Oral Tab, Take 1 tablet (5 mg total) by mouth daily. , Disp: 30 tablet, Rfl: 3 1100 [I.V.:1000; IV PIGGYBACK:100]  Out: 200 [Urine:200]      Physical Exam:                          HENOK: GKTMH, confused, not following commands or verbalizing, in NAD                          HEENT: oropharynx clear without erythema or exudates, chay infiltrate on CXR  -zosyn/azithro  -send urinary strep/legionella  -rapid covid negative  -sputum culture as able  3. SHWETHA: prerenal  -monitor with IVF  4. Hypovolemic hypernatremia  -monitor with volume resuscitation   5.  HTN:  -holding antihypertensives i

## 2020-07-21 NOTE — ED NOTES
[facility],  [address] for an update:    I spoke with the Nursing Supervisor   D5.45 placed on the 19th for 4 Liters .  Her line was infiltrated per the supervisor there was an order on the 17th  Which infiltrated they placed another midline which iis

## 2020-07-21 NOTE — ED NOTES
Patient has multiple bruises in different stages of healing throughout her body. Her left arm is edematous, with a few skin tears along with bruising.  The midline is infiltrated unable to see a blood return I did discontinue it  Patient will track me with

## 2020-07-21 NOTE — ED INITIAL ASSESSMENT (HPI)
[facility]    After a fall. She returned back to the nursing home after the fall. They sent her to us today because her BUN/Creat was abnormal  The home gave her D5.45 4 liters per medics not sure why.

## 2020-07-22 NOTE — PHYSICAL THERAPY NOTE
Order received for PT eval per [facility] protocol and chart reviewed. Attempted to see Pt this am, however, Pt requiring levophed and not medically appropriate to participate in therapy per RN. Will continue to follow.

## 2020-07-22 NOTE — OCCUPATIONAL THERAPY NOTE
Order received for OT eval per [facility] protocol and chart reviewed. Attempted to see Pt this am, however, Pt requiring levophed and not medically appropriate to participate in therapy per RN. Will continue to follow.         Thank you for allowing me to care

## 2020-07-22 NOTE — SLP NOTE
ADULT SWALLOWING EVALUATION    ASSESSMENT    ASSESSMENT/OVERALL IMPRESSION:  Pt seen this AM for bedside swallow evaluation. RN approved session. Pt admitted to hospital from NH due to abnormal labs.  Pt diagnosed with sepsis secondary to UTI and possible p Strategies Recommended: Slow rate;Small bites and sips  Aspiration Precautions: Upright position; Slow rate;Small bites and sips  Medication Administration Recommendations: One pill at a time; Whole in puree  Treatment Plan/Recommendations: Dysphagia therapy Functional Limits  Range of Motion: Within Functional Limits  Rate of Motion: Within Functional Limits    Voice Quality: Weak  Respiratory Status: Unlabored; Supplemental O2;Nasal cannula  Consistencies Trialed:  Thin liquids;Puree  Method of Presentation: S

## 2020-07-22 NOTE — PROGRESS NOTES
30 [facility] Patient Status:  Inpatient    10/3/1935 MRN [id number]   [facility] 4SW-A Attending Deidre Hernandez MD   [facility] Day # 1 PCP Jasmine Cardoza MD     Critical Care Progress Note     Date of Admission: 2020 10 Q8H PRN     OBJECTIVE:  /64   Pulse 92   Temp 97.9 °F (36.6 °C)   Resp 24   Ht 5' 3\" (1.6 m)   Wt 134 lb 14.7 oz (61.2 kg)   SpO2 100%   BMI 23.90 kg/m²      Ventilator Settings: 5L      Wt Readings from Last 3 Encounters:  07/22/20 : 134 lb 14.7 oz Imaging: I have independently visualized all relevant chest imaging in PACS. I agree with the radiology interpretation except where noted. ASSESSMENT/PLAN:  1. Septic shock: dehydration is likely also contributing. Blood cultures with +MRSA.

## 2020-07-22 NOTE — PROGRESS NOTES
[facility] Pharmacy Note:  Initiation of Stress Ulcer Prophylaxis     Juanjo Farnsworth is a 81year old patient and meets criteria for the initiation of stress ulcer prophylaxis based on the presence of: Medication(s) on home medication list.    famotidine (PEPCID)

## 2020-07-22 NOTE — DIETARY NOTE
[facility]    NUTRITION INITIAL ASSESSMENT    NUTRITION DIAGNOSIS/PROBLEM:    Increased nutrient needs related to wound healing as evidenced by stage 2 pressure ulcer on sacrum and unstageable pressure ulcer on right heel.      Unintentional wt loss re oz)  06/14/19 : 71.2 kg (157 lb)  05/02/19 : 71.2 kg (157 lb)  05/31/16 : 81.6 kg (180 lb)    NUTRITION:  Diet: Carb Controlled 2000 kcal/ 75 gm CHO per meal, Pureed, Thin Liquids  Oral Supplements: Ensure High Protein at breakfast and Magic Cup at dinner

## 2020-07-22 NOTE — PROGRESS NOTES
INFECTIOUS DISEASE PROGRESS NOTE    Kateryna Mehta Patient Status:  Inpatient    10/3/1935 MRN [id number]   [facility] 4SW-A Attending Laura Cartwright MD   [facility] Day # 1 PCP Davina Selby suppository 10 mg, 10 mg, Rectal, Daily PRN  •  ondansetron HCl (ZOFRAN) injection 4 mg, 4 mg, Intravenous, Q6H PRN  •  Metoclopramide HCl (REGLAN) injection 5 mg, 5 mg, Intravenous, Q8H PRN    Review of Systems:  Unable to complete.  Answering a few close Negative   BILUR Negative   KETUR Negative   BLOODURINE Large*   PHURINE 5.0   PROUR 30 *   UROBILINOGEN <2.0   NITRITE Negative   LEUUR Large*   WBCUR >50*   RBCUR >10*   BACUR 3+*   EPIUR Large*         Microbiology    Reviewed in EMR,    Radiology: Revi and RN. Lisette Clark PA-C    ID ATTENDING ADDENDUM     Pt seen an examined independently. Chart reviewed. Agree with above. Note has been reviewed by me and modified as needed. Exam and Impression/ Recs as noted above.   Will follow     Dalila

## 2020-07-22 NOTE — H&P
1020 [facility] Patient Status:  Inpatient    10/3/1935 MRN [id number]   [facility] 4SW-A Attending Wanda Juares MD   [facility] Day # 1 PCP Karina Bowser MD     Date:  2020  Date of Admission:   Comment: rarely    Drug use: Never    Allergies/Medications:    Allergies:   Pcns [Penicillins]      Runny nose  Sulfa Antibiotics       NAUSEA AND VOMITING  Trimethoprim            UNKNOWN  Celecoxib               NAUSEA ONLY  acetaminophen 500 MG Oral Tab 500 MG Oral Tab, Take 500 mg by mouth nightly. Review of Systems:   Pertinent items are noted in HPI. A comprehensive 10 point review of systems was completed. Pertinent positives and negatives noted in the the HPI.    Vital Signs:  Blood pressur 07/22/2020    T4F 0.9 08/03/2019    TSH 1.060 04/17/2020    ESRML 22 05/20/2019    CRP 0.45 (H) 05/20/2019    MG 2.3 07/22/2020    TROP 0.105 (HH) 04/15/2020    B12 917 08/03/2019       No results found.          Xr Shoulder, Complete (min 2 Views), Right ( Technologist)  Patient had multiple falls with contusion 7-4-20. FINDINGS:  VENTRICLES/SULCI:  Ventricles and sulci are stable in size. INTRACRANIAL:  There is no significant midline shift or mass effect. No evidence of acute intracranial hemorrhage. diffuse volume loss. Mild chronic small vessel ischemic disease. 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 There is no midline shift or mass-effect. The basal cisterns are patent.   The gray-white matter differen There is cervical lordosis. The vertebral body heights and disc heights are maintained. There are scattered endplate degenerative changes. No significant spinal canal stenosis is identified.   Assessment the cervical spinal canal is limited secondary to right of midline unchanged most likely tortuous vessels or enlarged right lobe of thyroid. New small amount of airspace disease in the lateral aspect of the left upper lobe suggesting pneumonia. Follow-up suggested.   Marked degenerative change of the lef Metabolic acidosis     Renal failure, unspecified chronicity     Elevated troponin     Suspected COVID-19 virus infection     CKD (chronic kidney disease) stage 3, GFR 30-59 ml/min (HCC)     Septic shock (HCC)     Septicemia (HCC)     Palliative care encou

## 2020-07-22 NOTE — PLAN OF CARE
Assumed care after handoff report. Pt knows her name and date of birth, drowsy, but easily arousable on 5LNC; saturations WNL. Generalized bruising and hematoma to face and neck. APN aware. Levophed ongoing. Titrating per order. Pt tolerating well.  Monitor

## 2020-07-22 NOTE — PROGRESS NOTES
[facility] Dosing Service    Initial Pharmacokinetic Consult for Vancomycin Dosing     Sophia Colby is a 81year old patient who is being treated for shock possibly due to sepsis with possible pneumonia.   Pharmacy has been asked to dose Vancomycin by

## 2020-07-22 NOTE — PLAN OF CARE
Passed bedside swallow, talking, knows place and person, recognized family, right neck hematoma noted and placed ice on it. Had ct of neck and abd and pelvis and chest with po contrast. Pt able to take po contrast but total feed.  Weaning levophed for bp, a

## 2020-07-22 NOTE — CM/SW NOTE
07/22/20 0800   CM/SW Screening   Referral Source Social Work (self-referral)   [facility] 32 staff; Chart review   Patient's [address] Hospital Name Mila Zach   Discharge Needs   Anticipated D/C needs Nursing

## 2020-07-23 NOTE — PROGRESS NOTES
AM Hgb 6.6 from 8.2 yesterday. No signs of active bleeding. Right neck stable. I/O show >8L positive so likely some dilution contributing. One unit PRBC ordered to transfuse. Follow up Hgb post transfusion.     KAVITHA Virgen  Critical Care NP  E

## 2020-07-23 NOTE — PROGRESS NOTES
INFECTIOUS DISEASE PROGRESS NOTE    Jordy King Patient Status:  Inpatient    10/3/1935 MRN [id number]   [facility] 4SW-A Attending Jurgen Hubbard MD   [facility] Day # 2 PCP Ariela Price (MIRALAX) powder packet 17 g, 17 g, Oral, Daily PRN  •  bisacodyl (DULCOLAX) rectal suppository 10 mg, 10 mg, Rectal, Daily PRN  •  ondansetron HCl (ZOFRAN) injection 4 mg, 4 mg, Intravenous, Q6H PRN  •  Metoclopramide HCl (REGLAN) injection 5 mg, 5 mg, In 27*   GFRNAA 15* 18*  18* 24*  24*   CA 8.5 7.8* 7.8*   ALB 1.4* 1.2* 1.3*   * 148* 147*   K 3.3* 3.6 3.0*   * 121* 119*   CO2 23.0 21.0 23.0   ALKPHO 175* 156* 137   AST 40* 23 24   ALT 54 35 26   BILT 0.9 0.9 0.7   TP 6.7 5.6* 4.9*     Recent glands are not well visualized and may be  atrophic, correlate clinically. The thyroid gland is small.  Along the isthmus of the thyroid gland is an approximately 1.4 x 1.7 cm low-density nodule, thyroid sonogram would be helpful for further evaluation as c colon.   URINARY BLADDER: Stable right posterior bladder diverticulum. PELVIC NODES: No adenopathy. PELVIC ORGANS: Pelvic organs appropriate for patient age. BONES: Marked degenerative change. DISH involves the thoracic spine. CONCLUSION:   1.  Bi appears to be hematoma on the left side. 7. Abnormal CT neck with enlarged parotid gland. See number #1.  8. SHWETHA on CKD, due to above. Improving. 9. Encephalopathy- assume TME due to above. Improving. 8. H/o MRSA BSI last April. ? Source.  Completed 4 w

## 2020-07-23 NOTE — PLAN OF CARE
Assumed care at shift change. Pt will open eyes to verbal stimuli. Oriented to person and knows she's in the hospital and the year. Will intermittently answer questions. See neuro flowsheet. SR with frequent PVCs. Potassium replaced and Magnesium wnl.  On l

## 2020-07-23 NOTE — CONSULTS
[facility]  Report of  Surgical Consultation with History and Physical Exam    Zhanna Villafuerte Patient Status:  Inpatient    10/3/1935 MRN [id number]   [facility] 4SW-A Attending vAtar Rockwell MD   Hosp Day # 2 PCP Binh Shen MD submandibular gland suggestive of sialoadenitis. There also appears to be some mild asymmetric enlargement of the right submandibular gland. The thyroid gland was incompletely assessed; however, there is a probable underlying nodule.     The patient's past day  •  famoTIDine (PEPCID) tab 20 mg, 20 mg, Oral, Nightly **OR** famoTIDine (PEPCID) injection 20 mg, 20 mg, Intravenous, Nightly  •  norepinephrine (LEVOPHED) 4 mg/250 ml premix infusion, 0.5-30 mcg/min, Intravenous, Continuous  •  Vancomycin HCl (VANCO musculature. Cardiac: Regular rate and rhythm. No murmur. Abdomen:  Non-distended, non-tympanic. Large umbilical hernia visible. Non-tender to palpation. No guarding. No peritoneal signs. Spleen not palpable. No fluid wave appreciated.     Extremit stone is seen. There may be some mild asymmetric enlargement of the right submandibular gland as well. This appears stable. Thyroid gland is diminutive and incompletely   assessed. Probable underlying nodule is again noted measuring up to 1.5 x 1.9 cm. the thyroid gland is an approximately 1.4 x 1.7 cm low-density nodule, thyroid sonogram would be helpful for further evaluation as clinically indicated. LYMPH NODES:  No pathological-appearing or enlarged lymph nodes.     SKULL BASE:  Foramina are symmet PELVIC NODES:  No adenopathy. PELVIC ORGANS:  Pelvic organs appropriate for patient age. BONES:  Marked degenerative change. DISH involves the thoracic spine. CONCLUSION:    1. Bilateral, predominantly upper lobe nodules.   Given the history, MD on 7/23/2020 at 3:09 PM       Finalized by (CST): Gonsalo Mirza MD on 7/23/2020 at 3:12 PM       Impression:  Patient Active Problem List:     DIABETES MELLITUS TYPE II     Glenohumeral arthritis, left     Glenohumeral arthritis, right     Poor tristen patient was admitted to the intensive care unit at [facility] for further care and evaluation. Plan:  1. No indication for urgent/emergent general surgical intervention at this time. 2. Recommend consult to ENT.   3. Continue IV antibiotics per inf

## 2020-07-23 NOTE — PHYSICAL THERAPY NOTE
PHYSICAL THERAPY EVALUATION - INPATIENT     Room Number: 460/460-A  Evaluation Date: 7/23/2020  Type of Evaluation: Initial  Physician Order: PT Eval and Treat    Presenting Problem: pneumonia, sepsis  Reason for Therapy: Mobility Dysfunction and Dis transfers and is now non-ambulatory. Pt has assist for all ADL. SUBJECTIVE  Pt able to say her name and states shes at the hospital.   Otherwise, minimal verbalizations. Patient self-stated goal is not stated.      OBJECTIVE  Precautions: (pressors) up from a chair with arms (e.g., wheelchair, bedside commode, etc.): Unable   -   Moving from lying on back to sitting on the side of the bed?: Unable   How much help from another person does the patient currently need. ..   -   Moving to and from a bed to balance impairments, generalized weakness, and decreased activity tolerance. Functional outcome measures completed include AMPAC.  Based on this evaluation, patient's clinical presentation is evolving and overall the evaluation complexity is considered high

## 2020-07-23 NOTE — OCCUPATIONAL THERAPY NOTE
OCCUPATIONAL THERAPY EVALUATION - INPATIENT     Room Number: 460/460-A  Evaluation Date: 7/23/2020  Type of Evaluation: Initial  Presenting Problem: pneumonia, sepsis, fall, SHWETHA, somnolence    Physician Order: IP Consult to Occupational Therapy  Reason for bowel and bladder. Patient in ED earlier this month for fall.     SUBJECTIVE   \"I am at the hospital.\"    Patient self-stated goal is n/a     OBJECTIVE  Precautions: Bed/chair alarm  Fall Risk: High fall risk    WEIGHT BEARING RESTRICTION  Weight Bearing Form  How much help from another person does the patient currently need…  -   Putting on and taking off regular lower body clothing?: Total  -   Bathing (including washing, rinsing, drying)?: Total  -   Toileting, which includes using toilet, bedpan or uri impairment often benefit from SNF. Melba Bills In this OT evaluation patient presents with the following performance deficits: decreased alertness, command following, AROM, strength, balance. . These deficits impact the patient’s ability to participate in self-care

## 2020-07-23 NOTE — SLP NOTE
SPEECH DAILY NOTE - INPATIENT    ASSESSMENT & PLAN   ASSESSMENT  Pt seen this AM for meal follow up and reassessment per RN request of swallow.  RN reported pt demonstrated intermittent cough with thin liquids via teaspoon and pt spitting out pureed and thi Recommendations: One pill at a time; Whole in puree    Patient Experiencing Pain: No    Discharge Recommendations  Discharge Recommendations/Plan: Undetermined    Treatment Plan  Treatment Plan/Recommendations: Dysphagia therapy    Interdisciplinary Communi

## 2020-07-23 NOTE — PROGRESS NOTES
30 [facility] Patient Status:  Inpatient    10/3/1935 MRN [id number]   [facility] 4SW-A Attending Tonio Cramer MD   [facility] Day # 2 PCP Lyndsey Gallegos MD     Critical Care Progress Note     Date of Admission: 2020 10 HCl (REGLAN) injection 5 mg, 5 mg, Intravenous, Q8H PRN     OBJECTIVE:  BP 93/44   Pulse 73   Temp 97.5 °F (36.4 °C) (Temporal)   Resp 25   Ht 5' 3\" (1.6 m)   Wt 142 lb 10.2 oz (64.7 kg)   SpO2 98%   BMI 25.27 kg/m²      Ventilator Settings:RA      Wt Kari  Component Value Date    INR 2.56 (H) 07/22/2020    INR 2.38 (H) 07/21/2020           Imaging: I have independently visualized all relevant chest imaging in PACS. I agree with the radiology interpretation except where noted. ASSESSMENT/PLAN:  1.  Se

## 2020-07-23 NOTE — PALLIATIVE CARE NOTE
Yue's chart was reviewed. I called Moses, the son/ HCPOA. Zac is familiar to palliative care from her hospitalization in April.    He continues to struggle with the restrictions of visitors at [facility] as he feels it is impacting his mothers progression of [place]

## 2020-07-23 NOTE — VASCULAR ACCESS
VASCULAR NOTE:  Makenzie Rubin, infectious disease APN,  does not recommend PICC or midline to be placed on this patient at this time. Please refer to Interventional Radiology for advanced vascular device placement.

## 2020-07-23 NOTE — PLAN OF CARE
Received pt resting in bed on 2LNC, weaning to room air- saturations WNL. Pt A&Ox1 (hx of dementia). Vitals stable, levophed infusing- titrating per order. Pt tolerating well. Voiding via purewick. Incontinent of stool.  Generalized edema, skin tears, bruis

## 2020-07-23 NOTE — PROGRESS NOTES
[facility]  Progress Note    Jordy King Patient Status:  Inpatient    10/3/1935 MRN [id number]   [facility] 4SW-A Attending Jurgen Hubbard MD   Hosp Day # 2 PCP Ariela Price MD         SUBJECTIVE:  Subjective:  Jordy King is a 2.41* 1.92*  1.92*   CA 8.5 7.8* 7.8*   MG  --  2.3 2.1   * 246* 159*       Recent Labs   Lab 07/21/20  1038 07/22/20  0519 07/23/20  0444   ALT 54 35 26   AST 40* 23 24   ALB 1.4* 1.2* 1.3*       Recent Labs   Lab 07/22/20  1157 07/22/20  1742 07/2 Tiffany Aschoff, MD on 7/04/2020 at 1:45 PM     Finalized by: Tiffany Aschoff, MD on 7/04/2020 at 1:47 PM          Ct Brain Or Head (82669)    Result Date: 7/21/2020  PROCEDURE:  CT BRAIN OR HEAD (49716)  COMPARISON:  [facility], CT BRAIN OR HEAD (48932 7/4/2020  PROCEDURE:  CT BRAIN OR HEAD (39409)  COMPARISON:  [facility] , CT, CT BRAIN OR HEAD (31545), 6/12/2020, 9:51 PM.  INDICATIONS:  fall, on thinners  TECHNIQUE:  Noncontrast CT scanning is performed through the brain.  Dose reduction techniques were use scanning is performed through the neck soft tissues. Dose reduction techniques were used. Dose information is transmitted to the [address] of Radiology) NRDR ([address]) which includes the Dose Index Registry.   PATIENT S 7/23/2020 at 12:37 PM     Finalized by (CST): Gonzalo Eddy MD on 7/23/2020 at 12:42 PM       Ct Spine Cervical (cpt=72125)    Result Date: 7/4/2020  PROCEDURE:  CT SPINE CERVICAL (CPT=72125)  COMPARISON:  [facility], CT SPINE CERVICAL (CPT=72125), 8/02/2019 (cpt=71045)    Result Date: 7/21/2020  PROCEDURE:  XR CHEST AP PORTABLE  (CPT=71045)  TECHNIQUE:  AP chest radiograph was obtained.   COMPARISON:  [facility] , CT, CT SPINE CERVICAL (CPT=72125), 7/04/2020, 12:07 PM.  [facility] , XR, XR CHEST AP PORTABLE  (CPT=7104 CERVICAL (CPT=72125), 7/04/2020, 12:07 PM.  [facility] , CT, CT ABDOMEN+PELVIS(CPT=74176), 6/12/2020, 10:39 PM.  INDICATIONS:  MRSA bacteremia without obvious source on exam  TECHNIQUE:    Unenhanced multislice scanning through the neck, chest, abdomen, and pe contrast throughout visualized esophagus. KERRY:  No mass or adenopathy. CARDIAC:  Coronary artery calcifications. PLEURA:  Small bilateral pleural effusions measure 1.2 cm in depth on the right and 0.6 cm in depth on the left.   CHEST WALL:  No mass or axi prominent small bowel loops are present with nonspecific air-fluid levels. No free fluid.    Dictated by: Rosalnida Keen MD on 7/22/2020 at 3:48 PM     Finalized by: Rosalinda Keen MD on 7/22/2020 at 4:06 PM              Meds:     • potassium chloride  20 mEq Intr Septicemia ([facility]     Palliative care encounter     Hypokalemia     MRSA bacteremia     Goals of care, counseling/discussion     Hypernatremia     Somnolence     SHWETHA (acute kidney injury) ([facility]     Sepsis due to undetermined organism ([facility]    Principal Prob

## 2020-07-24 NOTE — PHYSICAL THERAPY NOTE
PHYSICAL THERAPY TREATMENT NOTE - INPATIENT    Room Number: 460/460-A     Session: 1   Number of Visits to Meet Established Goals: 5    Presenting Problem: pneumonia, sepsis   History related to current admission: Pt is a 81 y.o. female admitted 7/21/20 d Static Sitting: Fair  Dynamic Sitting: Fair -           Static Standing: Not tested  Dynamic Standing: Not tested    ACTIVITY TOLERANCE  Pulse: ()  Heart Rate Source: Monitor     BP: 108/43  BP Locatio able to tolerate sitting at EOB, requires assist for all mobility. Pt would continue to benefit from IP PT to progress function, prevent deconditioning, barriers include age, medical prognosis, medical comorbidities.     DISCHARGE RECOMMENDATIONS  PT Disch

## 2020-07-24 NOTE — CONSULTS
[facility] [address] [place] [address] Cardiology  Report of Consultation    Ritchie Henderson Patient Status:  Inpatient    10/3/1935 MRN [id number]   [facility] 4SW-A Attending Alize Kidd MD   Hosp Day # 3 PCP Derrick Ortiz MD     Reason for Consultation MG Oral Tab, Take 5 mg by mouth Q12H., Disp: , Rfl:   famotidine 40 MG Oral Tab, Take 40 mg by mouth nightly., Disp: , Rfl:   mirtazapine 15 MG Oral Tab, Take 15 mg by mouth nightly., Disp: , Rfl:   amLODIPine Besylate 5 MG Oral Tab, Take 1 tablet (5 mg to VOMITING  Trimethoprim            UNKNOWN  Celecoxib               NAUSEA ONLY    Review of Systems:   No fevers, chills, change in weight or bowel habits. Ten point review of systems is otherwise negative or unremarkable.     Physical Exam:    07/24/20  1 --  23   ALT 35 26  --  23   BILT 0.9 0.7  --  0.8   TP 5.6* 4.9*  --  5.5*       Recent Labs   Lab 07/22/20  0519 07/23/20  0444 07/23/20  1436 07/24/20  0453   RBC 2.88* 2.31*  --  3.02*   HGB 8.2* 6.6* 9.2* 8.8*   HCT 26.7* 21.4*  --  27.4*   MCV 92.7 9

## 2020-07-24 NOTE — PROGRESS NOTES
30 [facility] Patient Status:  Inpatient    10/3/1935 MRN [id number]   [facility] 4SW-A Attending Emmy Gongora MD   [facility] Day # 3 PCP Angely Madsen MD     Critical Care Progress Note     Date of Admission: 2020 10 5.9 oz (67.3 kg)   SpO2 99%   BMI 26.28 kg/m²      Ventilator Settings:RA      Wt Readings from Last 3 Encounters:  07/24/20 : 148 lb 5.9 oz (67.3 kg)  06/12/20 : 157 lb 6.5 oz (71.4 kg)  04/16/20 : 157 lb 6.5 oz (71.4 kg)       I/O last 3 completed shifts radiology interpretation except where noted. ASSESSMENT/PLAN:  1. Septic shock: dehydration is likely also contributing. Blood cultures with +MRSA. Pt with history of similar MRSA septic shock in April for which no source was identified.   She was t

## 2020-07-24 NOTE — CONSULTS
[facility]  Report of Otolaryngology Consultation    Sophia Colby Patient Status:  Inpatient    10/3/1935 MRN [id number]   [facility] 4SW-A Attending Emmy Gongora MD   Hosp Day # 2 PCP Angely Madsen MD     Reason for Consultation: Lifestyle      Physical activity:        Days per week: Not on file        Minutes per session: Not on file      Stress: Not on file    Relationships      Social connections:        Talks on phone: Not on file        Gets together: Not on file        Atten Oral, Nightly **OR** famoTIDine (PEPCID) injection 20 mg, 20 mg, Intravenous, Nightly  •  norepinephrine (LEVOPHED) 4 mg/250 ml premix infusion, 0.5-30 mcg/min, Intravenous, Continuous  •  Vancomycin HCl (VANCOCIN) 1,000 mg in sodium chloride 0.9% 250 mL I tonsil normal. Left tonsil normal. Posterior pharyngeal wall normal. Mirror examination of the hypopharynx, larynx, and nasopharynx not performed due to strong gag reflex. + purulent drainage right submandibular duct. No palpable stones.   SALIVARY GLANDS: arthritis, right     Poor short term memory     Primary insomnia     Apathy     Anxiety     Tremor     Acute renal failure (HCC)     Age-related nuclear cataract of both eyes     Benign essential hypertension     Hypertensive chronic kidney disease with st

## 2020-07-24 NOTE — PLAN OF CARE
Problem: Impaired Swallowing  Goal: Minimize aspiration risk  Description  Interventions:  Puree with Nectar thick liquids  Straws OK, straw to facilitate retrieval.   - Patient should be alert and upright for all feedings (90 degrees preferred)  - Offer

## 2020-07-24 NOTE — PLAN OF CARE
Assumed care after shift handoff report. Pt alert and oriented to self and knows she's in the hospital, unable to recall why. On monitor NSR with occasional PVCs. Potassium and magnesium WNL. Levophed infusing, titrating per protocol. Pt tolerating well.  P

## 2020-07-24 NOTE — PROGRESS NOTES
Pt remains fatigued and sleeping most of shift. Awakes easily to light stimulus. A0x3. However, delayed responses. Mumbled speech. Sometimes appears to be staring off and hard to get pt attention during assessment.  Pt more conversant after speaking with fa

## 2020-07-24 NOTE — PROGRESS NOTES
INFECTIOUS DISEASE PROGRESS NOTE    Kaylen Webster Patient Status:  Inpatient    10/3/1935 MRN [id number]   [facility] 4SW-A Attending Indio Valdez MD   [facility] Day # 3 [facility] Intravenous, Q6H PRN  •  Metoclopramide HCl (REGLAN) injection 5 mg, 5 mg, Intravenous, Q8H PRN    Review of Systems:  Unable to complete. Physical Exam:    General: No acute distress. Somnolent but arousable.    Vital signs: Blood pressure 117/55, puls 18* 24*  24*  --  27*  27*   CA 7.8* 7.8*  --  8.1*   ALB 1.2* 1.3*  --  1.3*   * 147*  --  148*   K 3.6 3.0* 4.1 3.9   * 119*  --  122*   CO2 21.0 23.0  --  19.0*   ALKPHO 156* 137  --  146*   AST 23 24  --  23   ALT 35 26  --  23   BILT 0.9 0 fibrosis, pneumonitis or pneumonia. 3. Small bilateral pleural effusions.     Dictated by (CST): Dylon Gill MD on 7/23/2020 at 3:09 PM   Finalized by (CST): Dylon Gill MD on 7/23/2020 at 3:12 PM      PROCEDURE: CT SOFT TISSUE OF NECK (CPT=7 that assessment is limited without intravenous contrast. The right parotid gland and likely right submandibular gland appear mildly asymmetrically   enlarged with local inflammatory changes suggestive of sialoadenitis.  No evidence of an abscess or drainabl 7/21  - follow repeat blood culture (from central line) 7/22 and 7/23  - follow cr  - monitor hgb closely  - follow wbc to doc resolution--> will repeat CBC in am  - off load  - ENT on consult  - would not recommend placing PICC line or midline at this anibal

## 2020-07-24 NOTE — SLP NOTE
ADULT SWALLOWING EVALUATION    ASSESSMENT    ASSESSMENT/OVERALL IMPRESSION:  Orders were received for a bedside swallowing evaluation. Patient admitted with AMS. Known to our department from previous admits and seen in April for a VFSS.  Most recently recom therapy  Discharge Recommendations/Plan: Undetermined    HISTORY   MEDICAL HISTORY  Reason for Referral: R/O aspiration    Problem List  Principal Problem:    Somnolence  Active Problems:    Altered mental status    SHWETHA (acute kidney injury) ([facility] [address]    Sepsi Within Functional Limits  Tone: Within Functional Limits  Range of Motion: Within Functional Limits  Rate of Motion: Within Functional Limits    Voice Quality: Weak  Respiratory Status: Unlabored; Supplemental O2;Nasal cannula  Consistencies Trialed:  Thin l

## 2020-07-24 NOTE — PROGRESS NOTES
[facility]  Progress Note    Rosa Berrios Patient Status:  Inpatient    10/3/1935 MRN [id number]   [facility] 4SW-A Attending Zully Greenwood MD   Hosp Day # 3 PCP Beverly Gillette MD     Subjective: The patient is resting in bed.   She CREATSERUM 1.72 07/24/2020    GLU 96 07/24/2020    CA 8.1 07/24/2020    ALKPHO 146 07/24/2020    ALT 23 07/24/2020    AST 23 07/24/2020    BILT 0.8 07/24/2020    ALB 1.3 07/24/2020    TP 5.5 07/24/2020          Assessment:  Patient Active Problem List: re-consult if necessary. 4. GI prophylaxis with Pepcid.     Laura Taylor PA-C  7/24/2020  12:33 PM      I agree with the note as scribed by the PA  I have made all appropriate changes in documentation as necessary  I attest to the accuracy of this n

## 2020-07-24 NOTE — PROGRESS NOTES
[facility]  Progress Note    Recalaurence Vance Patient Status:  Inpatient    10/3/1935 MRN [id number]   [facility] 4SW-A Attending Negro Sims MD   Hosp Day # 3 PCP Blanche Marques MD         SUBJECTIVE:    81 y.o. female admitted / 23.0  --  19.0*   * 128* 98*  --  89*   CREATSERUM 2.82* 2.41*  2.41* 1.92*  1.92*  --  1.72*  1.72*   CA 8.5 7.8* 7.8*  --  8.1*   MG  --  2.3 2.1  --  2.1   * 246* 159*  --  96       Recent Labs   Lab 07/21/20  1038 07/22/20  0519 07/23/20 is identified. Probable heterotopic ossification seen lateral to the humeral head. CONCLUSION:  1. No acute displaced osseous fracture or dislocation is identified.   There is a lucency across the lateral margin of the humeral head which is favored to be intracranial hemorrhage is seen. Stable appearance of areas of hypoattenuation within the subcortical, deep and periventricular white matter. These most likely represent mild to moderate chronic microvascular ischemic changes.   If there is persistent cli within the right forehead and right periorbital soft tissues.     Dictated by: Amy Johns MD on 7/04/2020 at 12:41 PM     Finalized by: Amy Johns MD on 7/04/2020 at 12:45 PM          Ct Soft Tissue Of Neck (cpt=[id number])    Result Date: 7/23/202 without contrast.  Please note that assessment is limited without intravenous contrast.  The right parotid gland and likely right submandibular gland appear mildly asymmetrically enlarged with local inflammatory changes suggestive of sialoadenitis.   No lazaro persist or worsen, repeat imaging would be recommended given the marked motion degradation. 2. Mild to moderate degenerative changes in the cervical spine. 3. There is a 3 mm nodule within the left lung apex.   A follow-up chest CT in 1 year is recommended decrease in the left effusion.     Dictated by: John Menjivar MD on 7/21/2020 at 12:06 PM     Finalized by: John Menjivar MD on 7/21/2020 at 12:09 PM          Ct Stn Chest Abdomen Pelvis (all Wo) Combo (hfo=[id number])    Result Date: 7/22/ Foramina are symmetric without bony erosion.   VASCULATURE:  Limited views are limited due to lack of IV contrast.   CHEST:  LUNGS:  Multiple nodules are present, the largest are located within the upper lobe and measure 1.9 x 1.8 cm on the right and 1.3 x excluded. Differential includes malignancy, either primary or metastatic. 2. Oral contrast throughout the esophagus consistent with gastroesophageal reflux.  3. The right parotid gland is diffusely enlarged consistent with an inflammatory/infectious proces type     Dehydration     Counseling regarding advance care planning and goals of care     Hypoglycemia     Acute renal failure (ARF) (HCC)     Acute kidney injury ([facility] [address]     Metabolic acidosis     Renal failure, unspecified chronicity     Elevated troponin advanced dementia–on Aricept and Namenda     Osteoarthritis–Tylenol     Depression–Remeron     GERD–Pepcid     Dysphagia–diet per speech     DVT prophylaxis–subcu heparin     Hypertension-hold amlodipineTill blood pressure stabilized      Palliative care n

## 2020-07-25 NOTE — PROGRESS NOTES
[facility]  Progress Note    Mac [place] Patient Status:  Inpatient    10/3/1935 MRN [id number]   [facility] 4SW-A Attending Theron El MD   Hosp Day # 4 PCP Katherine Adamson MD         SUBJECTIVE:        Subjective:  Mac [place] 07/25/20  0500   * 148* 147*  --  148* 147*   K 3.3* 3.6 3.0* 4.1 3.9 3.6   * 121* 119*  --  122* 121*   CO2 23.0 21.0 23.0  --  19.0* 22.0   * 128* 98*  --  89* 65*   CREATSERUM 2.82* 2.41*  2.41* 1.92*  1.92*  --  1.72*  1.72* 1.61*  1 The right humeral head is high riding which may relate to rotator cuff pathology. No definitive dislocation. Degenerative changes are noted at the right [facility] joint. No acute displaced osseous fracture is identified.   Probable heterotopic ossification see No evidence for fracture or osseous abnormality. OTHER:             Small amount of soft tissue swelling overlying the right frontal calvarium is stable from the prior exam.  CONCLUSION:  No acute intracranial hemorrhage is seen.   Stable appearance of are of the right caudate and right thalamus. If there is clinical concern for acute ischemia/infarction, an MRI of the brain would be recommended for further evaluation. 3. Prominent contusion seen within the right forehead and right periorbital soft tissues. The visualized chest has a stable appearance from recent prior exam.  Please refer to that study for further details.           CONCLUSION:  No significant change from recent prior CT of the neck without contrast.  Please note that assessment is limited wit left lung apex. Degenerative changes noted at the C1-C2 articulation. CONCLUSION:  1. No fracture is seen within the limitations of the motion artifact.   However, if the patient's symptoms persist or worsen, repeat imaging would be recommended given the 6/12/2020 there is new airspace disease in the lateral aspect of the left upper lobe. There is decrease in the interstitial changes in the lingular segment and left lower lobe and slight decrease in the left effusion.     Dictated by: Niurka Alcantar MD cm low-density nodule, thyroid sonogram would be helpful for further evaluation as clinically indicated. LYMPH NODES:  No pathological-appearing or enlarged lymph nodes. SKULL BASE:  Foramina are symmetric without bony erosion.   VASCULATURE:  Limited vie BONES:  Marked degenerative change. DISH involves the thoracic spine. CONCLUSION:  1. Bilateral, predominantly upper lobe nodules. Given the history, infectious process cannot be excluded.   Differential includes malignancy, either primary or metastatic tract infection), bacterial     Vitamin D deficiency, unspecified     Late onset Alzheimer's disease with behavioral disturbance ([facility] [address]     Anemia     Thrombocytopenia (HCC)     Azotemia     Altered mental status     Altered mental status, unspecified alter general decline as well as due to underlying infection–supportive care IV antibiotics IV fluids will follow up       Sepsis Shock with MRSA bacteremia/Right parotitis/possible HCAP/UTI–Pan cultures, supportive care, IV antibiotics, infectious disease follo

## 2020-07-25 NOTE — PLAN OF CARE
Received pt awake and alert to person, and place, but disoriented to time. Pt follows commands. Pupils are equal and reactive to light. On room air with O2 sats 98%. Pt with purewick, but incontinent at times. Warm packs applied to each side of her neck.  R

## 2020-07-25 NOTE — SLP NOTE
SPEECH DAILY NOTE - INPATIENT    ASSESSMENT & PLAN   ASSESSMENT  Pt seen for monitor of diet consistency tolerance. Pt's RN also at the bedside. Pt easily aroused and was successfully able to be repositioned. Maintained good attention throughout.  Pt ok'd t

## 2020-07-25 NOTE — VASCULAR ACCESS
To pt's room to assess for PICC placement. Entire left arm extremely edematous and weeping. Right lower arm and antecubital area swollen and bruised. Do not feel pt is a candidate for PICC line at this time.   Discussed situation with nurse, Abhi Reza who sta

## 2020-07-25 NOTE — PLAN OF CARE
Assumed care of patient at change of shift. Pt drowsy, oriented to hospital setting season and person disoriented to time. Delayed responses. poor attention/concentration. Pt able to follow simple commands.  Room air, afebrile, sinus les/sinus with pacs an

## 2020-07-25 NOTE — PLAN OF CARE
Pt seen for monitor of diet consistency tolerance. Pt with no s/s of aspiration with puree and nectar by straw. Recommend to continue diet consistency as tolerated. RN aware. Speech to continue to monitor.

## 2020-07-25 NOTE — PROGRESS NOTES
INFECTIOUS DISEASE PROGRESS NOTE    Sophia Colby Patient Status:  Inpatient    10/3/1935 MRN [id number]   [facility] 4SW-A Attending Emmy Gongora MD   [facility] Day # 4 PCP Angely Madsen Daily PRN  •  bisacodyl (DULCOLAX) rectal suppository 10 mg, 10 mg, Rectal, Daily PRN  •  ondansetron HCl (ZOFRAN) injection 4 mg, 4 mg, Intravenous, Q6H PRN  •  Metoclopramide HCl (REGLAN) injection 5 mg, 5 mg, Intravenous, Q8H PRN    Review of Systems: 07/23/20  1436 07/24/20  0453 07/25/20  0500   * 159*  --  96 171*   * 98*  --  89* 65*   CREATSERUM 2.41*  2.41* 1.92*  1.92*  --  1.72*  1.72* 1.61*  1.61*   GFRAA 21*  21* 27*  27*  --  31*  31* 34*  34*   GFRNAA 18*  18* 24*  24*  --  27* on exam. Neck CT with enlarged parotid gland. There now appears to be hematoma on the left side. 7. SHWETHA on CKD, due to above. Improving overall. 8. Encephalopathy- assume TME due to above. Improving. 9. H/o MRSA BSI last April. ? Source.  Completed 4 wee

## 2020-07-25 NOTE — PROGRESS NOTES
[facility]  Progress Note    Beth Ceja Patient Status:  Inpatient    10/3/1935 MRN [id number]   [facility] 4SW-A Attending Kristy Escalante MD   Hosp Day # 4 PCP Bala Singer MD     Cardiology attending:  Seen and 3. 3* 3.6 3.0* 4.1 3.9 3.6   * 121* 119*  --  122* 121*   CO2 23.0 21.0 23.0  --  19.0* 22.0   * 128* 98*  --  89* 65*   CREATSERUM 2.82* 2.41*  2.41* 1.92*  1.92*  --  1.72*  1.72* 1.61*  1.61*   CA 8.5 7.8* 7.8*  --  8.1* 8.2*   MG  --  2.3 2 age, creatinine, barely over 60 kg). Hx of HTN   Home antihypertensives on hold while on Levophed gtt  Weaning off levo this am, follow pressure       Discussed plan of care with Dr. Leticia Clark.          Jaky Mata PA-C  7/25/2020  8:41 AM

## 2020-07-25 NOTE — PROGRESS NOTES
[facility] dosing service    Follow-up Pharmacokinetic Consult for Vancomycin Dosing     Ritchie Henderson is a 81year old patient who is being treated for MRSA bacteremia. Patient is on day 4 of Vancomycin and is currently receiving 1 gm IV Q 48 hours. random level of 16 drawn ~ 36h after previous dose. 2.  Will re-check Vancomycin trough levels with 3rd dose on 7/28. Goal trough level 15-20 ug/mL. 3.  Pharmacy will need Scr daily while on Vancomycin to assess renal function.     4.  Pharmacy will

## 2020-07-25 NOTE — PROGRESS NOTES
Critical Care Progress Note        NAME: Zak Alexander - ROOM: 460/460-A - MRN: [id number] - Age: 81year old - : 10/3/1935  Date of Admission: 2020 10:09 AM  Admission Diagnosis: Somnolence [R40.0]  SHWETHA (acute kidney injury) ([facility] [address] [N17.9]  Sepsis d bowel sounds normal; no masses,  no organomegaly  Extremities: extremities normal, atraumatic, no cyanosis or edema      Labs reviewed as noted below      ASSESSMENT/PLAN:  1. Septic shock: dehydration. Blood cultures with +MRSA.   Pt with history of simil

## 2020-07-26 NOTE — PROGRESS NOTES
Critical Care Progress Note        NAME: Zak Alexander - ROOM: 460/460-A - MRN: [id number] - Age: 81year old - : 10/3/1935  Date of Admission: 2020 10:09 AM  Admission Diagnosis: Somnolence [R40.0]  SHWETHA (acute kidney injury) ([facility] [address] [N17.9]  Sepsis d rhythm  Abdomen: soft, non-tender; bowel sounds normal; no masses,  no organomegaly  Extremities: extremities normal, atraumatic, no cyanosis or edema      Labs reviewed as noted below      ASSESSMENT/PLAN:  1. Septic shock: dehydration.   Blood cultures wi

## 2020-07-26 NOTE — PROGRESS NOTES
[facility]  Progress Note    Jordy King Patient Status:  Inpatient    10/3/1935 MRN [id number]   [facility] 4SW-A Attending Jurgen Hubbard MD   Hosp Day # 5 PCP Ariela Price MD         Subjective:    Remains NSR. 23.0   * 98*  --  89* 65* 51*   CREATSERUM 2.41*  2.41* 1.92*  1.92*  --  1.72*  1.72* 1.61*  1.61* 1.33*  1.33*   CA 7.8* 7.8*  --  8.1* 8.2* 7.8*   MG 2.3 2.1  --  2.1 2.2 2.0   PHOS  --   --   --   --  4.2 4.1   * 159*  --  96 171* 109* Now low.           Deborah Perez

## 2020-07-26 NOTE — PROGRESS NOTES
[facility]  Progress Note    Cara Cates Patient Status:  Inpatient    10/3/1935 MRN [id number]   [facility] 4SW-A Attending Juan Luna MD   Hosp Day # 5 PCP Makayla De Los Santos MD         SUBJECTIVE:        Subjective:  Cara Cates 07/22/20  0519 07/23/20  0444 07/23/20  1436 07/24/20  0453 07/25/20  0500 07/26/20  0514   * 147*  --  148* 147* 149*   K 3.6 3.0* 4.1 3.9 3.6 4.1   * 119*  --  122* 121* 122*   CO2 21.0 23.0  --  19.0* 22.0 23.0   * 98*  --  89* 65* 51 FINDINGS:  The externally rotated view is suboptimal.  Severe osteoarthritis within the right glenohumeral joint. The right humeral head is high riding which may relate to rotator cuff pathology. No definitive dislocation.   Degenerative changes are noted SINUSES:           No sign of acute sinusitis. MASTOIDS:          No sign of acute inflammation. SKULL:             No evidence for fracture or osseous abnormality.  OTHER:             Small amount of soft tissue swelling overlying the right frontal calvar hemorrhage or hydrocephalus. 2. Mild chronic small vessel ischemic disease with small chronic infarcts within the head of the right caudate and right thalamus.  If there is clinical concern for acute ischemia/infarction, an MRI of the brain would be recomme clinically indicated. No new lymphadenopathy is seen. Visualized nasopharynx, oropharynx and hypopharynx appear patent. The visualized chest has a stable appearance from recent prior exam.  Please refer to that study for further details.           Teri Hanks is limited secondary to CT technique. No gross fracture is seen. On image 123 of series 3, there is a 3 mm nodule within left lung apex. Degenerative changes noted at the C1-C2 articulation. CONCLUSION:  1.  No fracture is seen within the limitations of degenerative change of the left glenohumeral joint space and to a lesser extent the right. CONCLUSION:  Compared to the study of 6/12/2020 there is new airspace disease in the lateral aspect of the left upper lobe.   There is decrease in the interstitial c atrophic, correlate clinically. The thyroid gland is small. Along the isthmus of the thyroid gland is an approximately 1.4 x 1.7 cm low-density nodule, thyroid sonogram would be helpful for further evaluation as clinically indicated.   LYMPH NODES:  No pat right posterior bladder diverticulum. PELVIC NODES:  No adenopathy. PELVIC ORGANS:  Pelvic organs appropriate for patient age. BONES:  Marked degenerative change. DISH involves the thoracic spine. CONCLUSION:  1.  Bilateral, predominantly upper lobe n Hypertensive chronic kidney disease with stage 1 through stage 4 chronic kidney disease, or unspecified chronic kidney disease     Edema     Gout     Pseudophakia     UTI (urinary tract infection), bacterial     Vitamin D deficiency, unspecified     Late o underlying fibrosis, pneumonitis or pneumonia.   3. Small bilateral pleural effusions.       SHWETHA (acute kidney injury) ([facility] [address]  Possibly due to poor Oral intake due to worsening dementia and general decline as well as due to underlying infection–supportive ca

## 2020-07-26 NOTE — PLAN OF CARE
Levo restarted again overnight, attempted to shut off this am and SBP dropped to the 70s, resumed levo gtt. Voiding per purwick. Upper extremities edematous and weeping. Frequent oral care provided, warm pack applied to r. Sided neck.  Patient able to state

## 2020-07-27 NOTE — PLAN OF CARE
Report to receiving nurse. Norberto ALDEN updated care plan and transfer to 56. V/S . Transferred to 518 per bed. Incontinent of stool and urine prior to leaving floor care rendered removed pure wix full of stool .

## 2020-07-27 NOTE — PROGRESS NOTES
[facility]  Progress Note    Marylu Johnston Patient Status:  Inpatient    10/3/1935 MRN [id number]   [facility] 4SW-A Attending Deidre Hernandez MD   [facility] Day # 6 PCP Jasmine Cardoza MD         SUBJECTIVE:        Subjective:  Marylu Johnston Lab 07/22/20  0519 07/23/20  0444 07/23/20  1436 07/24/20  0453 07/25/20  0500 07/26/20  0514   * 147*  --  148* 147* 149*   K 3.6 3.0* 4.1 3.9 3.6 4.1   * 119*  --  122* 121* 122*   CO2 21.0 23.0  --  19.0* 22.0 23.0   * 98*  --  89* FINDINGS:  The externally rotated view is suboptimal.  Severe osteoarthritis within the right glenohumeral joint. The right humeral head is high riding which may relate to rotator cuff pathology. No definitive dislocation.   Degenerative changes are note SINUSES:           No sign of acute sinusitis. MASTOIDS:          No sign of acute inflammation. SKULL:             No evidence for fracture or osseous abnormality.  OTHER:             Small amount of soft tissue swelling overlying the right frontal calvar hemorrhage or hydrocephalus. 2. Mild chronic small vessel ischemic disease with small chronic infarcts within the head of the right caudate and right thalamus.  If there is clinical concern for acute ischemia/infarction, an MRI of the brain would be recomme clinically indicated. No new lymphadenopathy is seen. Visualized nasopharynx, oropharynx and hypopharynx appear patent. The visualized chest has a stable appearance from recent prior exam.  Please refer to that study for further details.           Rell Kilpatrick is limited secondary to CT technique. No gross fracture is seen. On image 123 of series 3, there is a 3 mm nodule within left lung apex. Degenerative changes noted at the C1-C2 articulation. CONCLUSION:  1.  No fracture is seen within the limitations of degenerative change of the left glenohumeral joint space and to a lesser extent the right. CONCLUSION:  Compared to the study of 6/12/2020 there is new airspace disease in the lateral aspect of the left upper lobe.   There is decrease in the interstitial c atrophic, correlate clinically. The thyroid gland is small. Along the isthmus of the thyroid gland is an approximately 1.4 x 1.7 cm low-density nodule, thyroid sonogram would be helpful for further evaluation as clinically indicated.   LYMPH NODES:  No pat right posterior bladder diverticulum. PELVIC NODES:  No adenopathy. PELVIC ORGANS:  Pelvic organs appropriate for patient age. BONES:  Marked degenerative change. DISH involves the thoracic spine. CONCLUSION:  1.  Bilateral, predominantly upper lobe n with stage 1 through stage 4 chronic kidney disease, or unspecified chronic kidney disease     Edema     Gout     Pseudophakia     UTI (urinary tract infection), bacterial     Vitamin D deficiency, unspecified     Late onset Alzheimer's disease with behavi pneumonia.   3. Small bilateral pleural effusions.       SHWETHA (acute kidney injury) ([facility] [address]  Possibly due to poor Oral intake due to worsening dementia and general decline as well as due to underlying infection–supportive care IV antibiotics IV fluids will fol

## 2020-07-27 NOTE — PROGRESS NOTES
Zhanna Villafuerte Patient Status:  Inpatient    10/3/1935 MRN [id number]   [facility] 4SW-A Attending Avtar Rockwell MD   Hosp Day # 6 PCP Binh Shen MD     Critical Care Progress Note      Assessment/Plan:  1. Septic shock: dehydration. no murmurs or extra heart sounds   Respiratory: Diminished breath soudns   GI: Soft, NTND, +normoactive BS   Ext: ++b/l UE edema     Recent Labs   Lab 07/26/20  0514   RBC 2.43*   HGB 7.2*   HCT 23.1*   MCV 95.1   MCH 29.6   MCHC 31.2   RDW 16.4*   NEPRELI

## 2020-07-27 NOTE — PROGRESS NOTES
INFECTIOUS DISEASE PROGRESS NOTE    Beth Ceja Patient Status:  Inpatient    10/3/1935 MRN [id number]   [facility] 4SW-A Attending Kristy Escalante MD   [facility] Day # 6 ARACELIS Singer (MIRALAX) powder packet 17 g, 17 g, Oral, Daily PRN  •  bisacodyl (DULCOLAX) rectal suppository 10 mg, 10 mg, Rectal, Daily PRN  •  ondansetron HCl (ZOFRAN) injection 4 mg, 4 mg, Intravenous, Q6H PRN  •  Metoclopramide HCl (REGLAN) injection 5 mg, 5 mg, In Recent Labs   Lab 07/23/20  0444  07/24/20  0453 07/25/20  0500 07/26/20  0514   *  --  96 171* 109*   BUN 98*  --  89* 65* 51*   CREATSERUM 1.92*  1.92*  --  1.72*  1.72* 1.61*  1.61* 1.33*  1.33*   GFRAA 27*  27*  --  31*  31* 34*  34* 43*  43 Resolved. 6. R head/ neck hematoma- no mal cellulitis on exam. Neck CT with enlarged parotid gland. Small hematoma on left side (improving). 7. SHWETHA on CKD, due to above. Improving overall. 8. Encephalopathy- assume TME due to above. Improving.   9. H/o

## 2020-07-27 NOTE — PLAN OF CARE
Received this am comfortable . Assisted with breakfast meal taken well. Denies pain. Transfer orders see flow sheet for ongoing assessments. Labs unable to draw due to so much edema.  Vascular access team unable to place piccline they recommend getting IR t

## 2020-07-27 NOTE — PLAN OF CARE
Received AO x 2. Off with date/time and situation. On RA maintaining sats; SR-SB on the monitor. BP stable.  On Midodrine and low dose BB  Able to take PO pills with apple sauce w/o difficulty  +generalized swelling ivett of BUE 4+ edema  Lab unable to draw MD (or speech pathologist) if coughing or persistent throat clearing or wet/gurgly vocal quality is noted    Outcome: Progressing

## 2020-07-27 NOTE — PLAN OF CARE
Assumed pt care at 1300. Pt resting in bed. Alert x1 for me. BUE edematous and weeping. LUE DVT. Left arm precautions. Down to Cath lab for PICC placement today. Pt has stage 3 sacral wound. Dressing changed. Elevating BUE.   Will continue with elana

## 2020-07-27 NOTE — PROCEDURES
30 [facility] Patient Status:  Inpatient    10/3/1935 MRN [id number]   [facility] [place] 5NW-A Attending Mike Sheets MD   Hosp Day # 6 PCP Yomi James MD          Procedure Report    Pre-Operative Diagnosis: Patient requi

## 2020-07-27 NOTE — PROGRESS NOTES
[address] [facility]  Progress Note    Jairo Olivares Patient Status:  Inpatient    10/3/1935 MRN [id number]   [facility] 4SW-A Attending Mega Fry MD   Hosp Day # 6 PCP Abi Wong MD         Subjective:    Remains NSR. 89* 65* 51*   CREATSERUM 2.41*  2.41* 1.92*  1.92*  --  1.72*  1.72* 1.61*  1.61* 1.33*  1.33*   CA 7.8* 7.8*  --  8.1* 8.2* 7.8*   MG 2.3 2.1  --  2.1 2.2 2.0   PHOS  --   --   --   --  4.2 4.1   * 159*  --  96 171* 109*       Recent Labs   Lab 07/ back at 5 mg BID.       [facility]

## 2020-07-27 NOTE — SLP NOTE
SPEECH DAILY NOTE - INPATIENT    ASSESSMENT & PLAN   ASSESSMENT  Pt seen this AM for meal follow up and education session. RN reported good tolerance of diet without s/s of aspiration or consistency intolerance. Pt cooperative, pleasant, and alert.  Trial t Date: 07/27/20  Number of Visits to Meet Established Goals: (1-2)    Session: 2/2    If you have any questions, please contact Andreas Moses M.S. [phone] [facility]  Pager [phone]

## 2020-07-27 NOTE — PROGRESS NOTES
07/27/20 1832   Clinical Encounter Type   Visited With Health care provider  (AD requested Ben Desai reported pt is not decisional.  Request to RN to page [phone] when Miladys Landry returns)   Routine Visit   (AD requested)   Referral From    Referral To Stepan Coronel

## 2020-07-27 NOTE — PROCEDURES
30 [facility] Patient Status:  Inpatient    10/3/1935 MRN [id number]   Location [address] [facility] Attending Theron El, [address] [facility] Day # 6 PCP Katherine Adamson MD          Procedure Report    Pre-Operative Diagnosi

## 2020-07-27 NOTE — PHYSICAL THERAPY NOTE
PHYSICAL THERAPY TREATMENT NOTE - INPATIENT    Room Number: 061/851-B     Session: 2   Number of Visits to Meet Established Goals: 5  History related to current admission: Pt is a 81 y.o. female admitted 7/21/20 due to abnormal labs.  Pt presenting with se tested    ACTIVITY TOLERANCE                         O2 WALK     SPO2 Ambulation on Room Air: 92(sitting at EOB)              AM-PAC '6-Clicks' INPATIENT SHORT FORM - BASIC MOBILITY  How much difficulty does the patient currently have. ..  -   Turning over skilled IP PT services to progress with mobility skills. DISCHARGE RECOMMENDATIONS  PT Discharge Recommendations: Long term care     PLAN  PT Treatment Plan: Bed mobility; Endurance; Energy conservation;Patient education;Range of motion;Strengthening;Tr

## 2020-07-27 NOTE — PROCEDURES
[facility]  Pre-Procedure Note    Name: Marquis Lepe  MRN#: [id number]  : 10/3/1935    Procedure:  Ultrasound and Fluoro Guided PICC Placement    Indication: Sepsis.   No IV access    Allergies:      Pcns [Penicillins]      Runny nose  Sulfa Antibio

## 2020-07-28 NOTE — PROGRESS NOTES
120 [facility] dosing service    Follow-up Pharmacokinetic Consult for Vancomycin Dosing     Zak Alexander is a 81year old patient who is being treated for MRSA bacteremia.    Patient is on day 8 of Vancomycin and is currently receiving 1 gm IV every 36 hour function)    2. Will re-check Vancomycin trough levels prior to 3rd dose. Goal trough level 15-20 ug/mL. 3.  Pharmacy will need Scr daily while on Vancomycin to assess renal function. 4.  Pharmacy will follow and adjust as necessary.     We apprecia

## 2020-07-28 NOTE — PROGRESS NOTES
Pulmonary Progress Note        NAME: Chaya Desai - ROOM: [address] [place] [address] - MRN: [id number] - Age: 81year old - : 10/3/1935        Last 24hrs: No events overnight, sitting up in bed having breakfast    OBJECTIVE:   20  0420 20  0721 20  080 Pulmonary and Critical Care

## 2020-07-28 NOTE — CM/SW NOTE
Care Progression Note:  Active Acute Medical Issue:   80 y/o female with septic shock with MRSA bacteremia, possible HCAP, UTI and right head neck hematoma, currently on room air, transferred out of ICU yesterday  SHWETHA on CKD, improving  Anemia  thrombocyto

## 2020-07-28 NOTE — PROGRESS NOTES
[facility]  Progress Note    Benycristina Strongsydnie Patient Status:  Inpatient    10/3/1935 MRN [id number]   [facility] 4SW-A Attending Rosa Lenz MD   [facility] Day # 7 PCP Reena Crespo MD         SUBJECTIVE:        Subjective:  Beny Cole 07/27/20  1409 07/28/20  0355   *  --  148* 147* 149* 144  --    K 3.0* 4.1 3.9 3.6 4.1 4.6  --    *  --  122* 121* 122* 120*  --    CO2 23.0  --  19.0* 22.0 23.0 19.0*  --    BUN 98*  --  89* 65* 51* 43*  --    CREATSERUM 1.92*  1.92*  --  1.7 suboptimal.  Severe osteoarthritis within the right glenohumeral joint. The right humeral head is high riding which may relate to rotator cuff pathology. No definitive dislocation. Degenerative changes are noted at the right [facility] joint.   No acute displace sinusitis. MASTOIDS:          No sign of acute inflammation. SKULL:             No evidence for fracture or osseous abnormality.  OTHER:             Small amount of soft tissue swelling overlying the right frontal calvarium is stable from the prior exam. chronic small vessel ischemic disease with small chronic infarcts within the head of the right caudate and right thalamus. If there is clinical concern for acute ischemia/infarction, an MRI of the brain would be recommended for further evaluation.  3. Promi lymphadenopathy is seen. Visualized nasopharynx, oropharynx and hypopharynx appear patent. The visualized chest has a stable appearance from recent prior exam.  Please refer to that study for further details.           CONCLUSION:  No significant change f technique. No gross fracture is seen. On image 123 of series 3, there is a 3 mm nodule within left lung apex. Degenerative changes noted at the C1-C2 articulation. CONCLUSION:  1. No fracture is seen within the limitations of the motion artifact.   Dex Taylor glenohumeral joint space and to a lesser extent the right. CONCLUSION:  Compared to the study of 6/12/2020 there is new airspace disease in the lateral aspect of the left upper lobe.   There is decrease in the interstitial changes in the lingular segment a The thyroid gland is small. Along the isthmus of the thyroid gland is an approximately 1.4 x 1.7 cm low-density nodule, thyroid sonogram would be helpful for further evaluation as clinically indicated.   LYMPH NODES:  No pathological-appearing or enlarged diverticulum. PELVIC NODES:  No adenopathy. PELVIC ORGANS:  Pelvic organs appropriate for patient age. BONES:  Marked degenerative change. DISH involves the thoracic spine. CONCLUSION:  1. Bilateral, predominantly upper lobe nodules.   Given the histo disease, or unspecified chronic kidney disease     Edema     Gout     Pseudophakia     UTI (urinary tract infection), bacterial     Vitamin D deficiency, unspecified     Late onset [facility]'s disease with [facility] ([facility]     Anemia     Bharat Pejose care IV antibiotics IV fluids will follow up     Left upper extremity DVT–Eliquis 5 twice a day      Sepsis Shock with MRSA bacteremia/Right parotitis/possible HCAP/UTI–Pan cultures, supportive care, IV antibiotics, infectious disease follow    metabolic e

## 2020-07-28 NOTE — PLAN OF CARE
Pt AOx1. Dementia. Bruising on R side of face and neck from PTA. VSS on RA. Tele, NSR. Eliquis. +2 edema BUE. Scds. Briefed. [place]wick on. Denies pain. Total lift. Iv abx. R picc line intact and infusing. Stage 3 wound on sacrum w/ mepilex.  Pureed diet w/ ne strengthening/mobility  - Encourage toileting schedule  Outcome: Progressing     Problem: DISCHARGE PLANNING  Goal: Discharge to home or other facility with appropriate resources  Description  INTERVENTIONS:  - Identify barriers to discharge w/pt and careg Monitor arterial and/or venous puncture sites for bleeding and/or hematoma  - Assess quality of pulses, skin color and temperature  - Assess for signs of decreased coronary artery perfusion - ex.  Angina  - Evaluate fluid balance, assess for edema, trend we increasing activity/tolerance for mobility and gait  - Educate and engage patient/family in tolerated activity level and precautions  - Recommend use of total lift for transfers   Outcome: Progressing     Problem: Impaired Activities of Daily Living  Goal:

## 2020-07-28 NOTE — PROGRESS NOTES
INFECTIOUS DISEASE PROGRESS NOTE    Tresa Yanni Patient Status:  Inpatient    10/3/1935 MRN [id number]   [facility] 4SW-A Attending Maritza Marrufo MD   [facility] Day # 7 PCP Crystal Collier g, Oral, Daily PRN  •  bisacodyl (DULCOLAX) rectal suppository 10 mg, 10 mg, Rectal, Daily PRN  •  ondansetron HCl (ZOFRAN) injection 4 mg, 4 mg, Intravenous, Q6H PRN  •  Metoclopramide HCl (REGLAN) injection 5 mg, 5 mg, Intravenous, Q8H PRN    Review of S 07/28/20  0355   *  --  96 171* 109* 150*  --    BUN 98*  --  89* 65* 51* 43*  --    CREATSERUM 1.92*  1.92*  --  1.72*  1.72* 1.61*  1.61* 1.33*  1.33* 1.40*  1.40* 1.23*   GFRAA 27*  27*  --  31*  31* 34*  34* 43*  43* 40*  40* 47*   GFRNAA 24*  2 coaxial sheath facilitating insertion of the standard introducer   sheath. Through the introducer sheath a 5 Setswana dual lumen PICC was passed into the vein and positioned within the superior vena cava using fluoroscopic guidance.  The catheter was then sec cellulitis on exam. Neck CT with enlarged parotid gland. Small hematoma on left side. 7. SHWETHA on CKD, due to above. Improving. 8. Encephalopathy- assume TME due to above. Improving. 9. H/o MRSA BSI last April. ? Source. Completed 4 weeks of treatment.   1

## 2020-07-28 NOTE — DIETARY NOTE
[facility]    NUTRITION FOLLOW-UP ASSESSMENT    NUTRITION DIAGNOSIS/PROBLEM:    Increased nutrient needs related to wound healing as evidenced by stage 2 pressure ulcer on sacrum and unstageable pressure ulcer on right heel.      Unintentional wt loss (137 lb 2 oz)   07/22/20 0557 61.2 kg (134 lb 14.7 oz)     Wt Readings from Last 10 Encounters:  07/28/20 : 69.8 kg (153 lb 14.4 oz)  06/12/20 : 71.4 kg (157 lb 6.5 oz)  04/16/20 : 71.4 kg (157 lb 6.5 oz)  08/05/19 : 69.5 kg (153 lb 3.2 oz)  06/14/19 : 71.

## 2020-07-28 NOTE — PLAN OF CARE
Vital signs stable. Pt denies pain. Pt is on room air And sp02>92% and denies shortness of breath and no cough noted. Pt is a total lift. Pt is still weeping from BUE which are elevated on a pillow. Pt's dressings are dry and intact.  Pt did not want to eac strengthening/mobility  - Encourage toileting schedule  Outcome: Progressing     Problem: DISCHARGE PLANNING  Goal: Discharge to home or other facility with appropriate resources  Description  INTERVENTIONS:  - Identify barriers to discharge w/pt and careg Monitor arterial and/or venous puncture sites for bleeding and/or hematoma  - Assess quality of pulses, skin color and temperature  - Assess for signs of decreased coronary artery perfusion - ex.  Angina  - Evaluate fluid balance, assess for edema, trend we increasing activity/tolerance for mobility and gait  - Educate and engage patient/family in tolerated activity level and precautions  - Recommend use of total lift for transfers   Outcome: Progressing     Problem: Impaired Activities of Daily Living  Goal:

## 2020-07-28 NOTE — PROGRESS NOTES
[facility]  Progress Note    Tresa Yanni Patient Status:  Inpatient    10/3/1935 MRN [id number]   [facility] 4SW-A Attending Maritza Marrufo MD   Hosp Day # 7 PCP Crystal Collier MD         Subjective:    Remains NSR. 4.6  --    *  --  122* 121* 122* 120*  --    CO2 23.0  --  19.0* 22.0 23.0 19.0*  --    BUN 98*  --  89* 65* 51* 43*  --    CREATSERUM 1.92*  1.92*  --  1.72*  1.72* 1.61*  1.61* 1.33*  1.33* 1.40*  1.40* 1.23*   CA 7.8*  --  8.1* 8.2* 7.8* 8.2*  -- antibiotics    PAF  Beta blockers, eliquis. She should tolerate 12.5 mg lopressor, won't affect BP. Hx of HTN   Now low. RADHA DVT - Eliquis back at 5 mg BID. Will follow peripherally, Thanks!       Demarco Gee

## 2020-07-29 NOTE — PROGRESS NOTES
INFECTIOUS DISEASE PROGRESS NOTE    Kaylen Webster Patient Status:  Inpatient    10/3/1935 MRN [id number]   [facility] 4SW-A Attending Indio Valdez MD   [facility] Day # [facility] injection 4 mg, 4 mg, Intravenous, Q6H PRN  •  Metoclopramide HCl (REGLAN) injection 5 mg, 5 mg, Intravenous, Q8H PRN    Review of Systems:  Unable to complete. Physical Exam:    General: No acute distress. Awake.    Vital signs: Blood pressure 123/73, 144  --   --  144   K 3.9   < > 4.1 4.6  --   --  4.0   *   < > 122* 120*  --   --  120*   CO2 19.0*   < > 23.0 19.0*  --   --  24.0   ALKPHO 146*  --  103  --   --   --  92   AST 23  --  20  --   --   --  19   ALT 23  --  16  --   --   --  16   BILT Eliquis. PLAN:    - continue IV vancomycin for now. Platelets stable.  Will continue to monitor for a rash and worsening thrombocytopenia, which she developed last admission while on vanco.   - monitor respiratory status closely  - follow cr  - monitor h

## 2020-07-29 NOTE — PLAN OF CARE
Vital signs stable. Pt denies pain. Pt is on room air And sp02>92% and does not have a cough. Pt is a total lift. Pt's dressings are dry and intact, pt has a stage 3 on her sacrum covered with optifoam. Pt is still weeping from both her arms.  Pt is not eat toileting schedule  Outcome: Progressing     Problem: DISCHARGE PLANNING  Goal: Discharge to home or other facility with appropriate resources  Description  INTERVENTIONS:  - Identify barriers to discharge w/pt and caregiver  - Include patient/family/disch puncture sites for bleeding and/or hematoma  - Assess quality of pulses, skin color and temperature  - Assess for signs of decreased coronary artery perfusion - ex.  Angina  - Evaluate fluid balance, assess for edema, trend weights  Outcome: Progressing  Go mobility and gait  - Educate and engage patient/family in tolerated activity level and precautions  - Recommend use of total lift for transfers   Outcome: Progressing     Problem: Impaired Activities of Daily Living  Goal: Achieve highest/safest level of i

## 2020-07-29 NOTE — PROGRESS NOTES
[facility]  Progress Note    Sybiljudith Cardosofran Patient Status:  Inpatient    10/3/1935 MRN [id number]   [facility] 4SW-A Attending Porfirio Prado MD   [facility] Day # 8 PCP Raymond Moise MD         SUBJECTIVE:        Subjective:  Marcelo Garcia 07/28/20  0355 07/29/20  0539   *  --  148* 147* 149* 144  --   --    K 3.0* 4.1 3.9 3.6 4.1 4.6  --   --    *  --  122* 121* 122* 120*  --   --    CO2 23.0  --  19.0* 22.0 23.0 19.0*  --   --    BUN 98*  --  89* 65* 51* 43*  --   --    ANNA suboptimal.  Severe osteoarthritis within the right glenohumeral joint. The right humeral head is high riding which may relate to rotator cuff pathology. No definitive dislocation. Degenerative changes are noted at the right [facility] joint.   No acute displace sinusitis. MASTOIDS:          No sign of acute inflammation. SKULL:             No evidence for fracture or osseous abnormality.  OTHER:             Small amount of soft tissue swelling overlying the right frontal calvarium is stable from the prior exam. chronic small vessel ischemic disease with small chronic infarcts within the head of the right caudate and right thalamus. If there is clinical concern for acute ischemia/infarction, an MRI of the brain would be recommended for further evaluation.  3. Promi lymphadenopathy is seen. Visualized nasopharynx, oropharynx and hypopharynx appear patent. The visualized chest has a stable appearance from recent prior exam.  Please refer to that study for further details.           CONCLUSION:  No significant change f technique. No gross fracture is seen. On image 123 of series 3, there is a 3 mm nodule within left lung apex. Degenerative changes noted at the C1-C2 articulation. CONCLUSION:  1. No fracture is seen within the limitations of the motion artifact.   Vernal Heap glenohumeral joint space and to a lesser extent the right. CONCLUSION:  Compared to the study of 6/12/2020 there is new airspace disease in the lateral aspect of the left upper lobe.   There is decrease in the interstitial changes in the lingular segment a The thyroid gland is small. Along the isthmus of the thyroid gland is an approximately 1.4 x 1.7 cm low-density nodule, thyroid sonogram would be helpful for further evaluation as clinically indicated.   LYMPH NODES:  No pathological-appearing or enlarged diverticulum. PELVIC NODES:  No adenopathy. PELVIC ORGANS:  Pelvic organs appropriate for patient age. BONES:  Marked degenerative change. DISH involves the thoracic spine. CONCLUSION:  1. Bilateral, predominantly upper lobe nodules.   Given the histo disease     Edema     Gout     Pseudophakia     UTI (urinary tract infection), bacterial     Vitamin D deficiency, unspecified     Late onset Alzheimer's disease with behavioral disturbance (HCC)     Anemia     Thrombocytopenia ([address]     Azotemia     Alter follow up     Left upper extremity DVT–Eliquis 5 twice a day      Sepsis Shock with MRSA bacteremia/Right parotitis/possible HCAP/UTI–Pan cultures, supportive care, IV antibiotics, infectious disease follow    metabolic encephalopathy/baseline dementia wit

## 2020-07-29 NOTE — PROGRESS NOTES
07/28/20 4982   Clinical Encounter Type   Visited With Health care provider  (Health care provider said that patient does not understand enough to have a meaningful conversation for a spiritual care visit.  Also, health care provider said that advanced d

## 2020-07-29 NOTE — PLAN OF CARE
Pt is aox1, VSS, afebrile. Confused baseline. RA. Tele NSR with PAC + PVC. Total lift, QID accucheck. Left arm precautions for DVT. Arms are weeping so there are chucks underneath. Left arm is elevated. PICC line draws well. Saline locked.  Puree diet with appropriate and evaluate response  - Consider cultural and social influences on pain and pain management  - Manage/alleviate anxiety  - Utilize distraction and/or relaxation techniques  - Monitor for opioid side effects  - Notify MD/LIP if interventions un

## 2020-07-29 NOTE — PHYSICAL THERAPY NOTE
PHYSICAL THERAPY TREATMENT NOTE - INPATIENT    Room Number: 851/903-T     Session: 3   Number of Visits to Meet Established Goals: 5     History related to current admission: Pt is a 81 y.o. female admitted 7/21/20 due to abnormal labs.  Pt presenting with TOLERANCE                         O2 WALK                    AM-PAC '6-Clicks' INPATIENT SHORT FORM - BASIC MOBILITY  How much difficulty does the patient currently have. ..  -   Turning over in bed (including adjusting bedclothes, sheets and blankets)?: Un education;Range of motion;Strengthening;Transfer training;Balance training  Rehab Potential : Guarded  Frequency (Obs): 3x/week    CURRENT GOALS      Goal #1 Patient is able to demonstrate supine - sit EOB @ level: maximum assistance MET new supine to sit

## 2020-07-30 NOTE — CONSULTS
[id number] Patient Status:  Inpatient    10/3/1935 MRN [id number]   [facility] 5NW-A Attending Deidre Hernandez MD   [facility] Day # 9 PCP Jasmine Cardoza MD     Date of Consult: 20   To ml/min) (HCC)    • UTI (urinary tract infection)      Past Surgical History:   Procedure Laterality Date   • APPENDECTOMY     • CHOLECYSTECTOMY         Palliative Care Social History:        Substance Use History:  Social History    Tobacco Use      Smokin PEG 3350 (MIRALAX) powder packet 17 g, 17 g, Oral, Daily PRN  •  bisacodyl (DULCOLAX) rectal suppository 10 mg, 10 mg, Rectal, Daily PRN  •  ondansetron HCl (ZOFRAN) injection 4 mg, 4 mg, Intravenous, Q6H PRN  •  Metoclopramide HCl (REGLAN) injection 5 mg, PTT 38.0 (H) 07/21/2020       Chemistry:  Lab Results   Component Value Date    CREATSERUM 1.20 (H) 07/30/2020    BUN 34 (H) 07/29/2020     07/29/2020    K 4.0 07/29/2020     (H) 07/29/2020    CO2 24.0 07/29/2020    GLU 85 07/29/2020    CA 7.6 Counseling/Discussion:    I spoke with pt's son, Bassam Ortega by phone. He is familiar with our service and our role, we have spoken on prior admission about pt's [address] and ACP. I asked Bassam Ortega how he is doing and he explained he is struggling.  He said he visited [place] that he can consider this - speak with his mother, his wife about requesting hospice eval for the purpose of collecting information, and no obligation to enroll for services.  Loly Pickett asked if she would go back to [place] with hospice services, and I educated th Glenohumeral arthritis, right     Poor short term memory     Primary insomnia     Apathy     Anxiety     Tremor     Acute renal failure (HCC)     Age-related nuclear cataract of both eyes     Benign essential hypertension     Hypertensive chronic kidney d when ready. Code Status:  DNAR/Selective Treatment, NO medically administered means of nutrition. POLST (2019) is on file reflecting above; readily available for review by clicking \"Code\" status in [facility].     HCPOA: Document has been requested pr

## 2020-07-30 NOTE — PLAN OF CARE
Vital signs stable. Pt is still weeping from BUE. Pt denies pain and. Pt is on room air And sp02>92%. Pt is a total lift.  Pt will continue with plan of care and discharge is TBD because pt was supposed to dc on 7/29 but is not eating so MD consulted GI and schedule  Outcome: Progressing     Problem: DISCHARGE PLANNING  Goal: Discharge to home or other facility with appropriate resources  Description  INTERVENTIONS:  - Identify barriers to discharge w/pt and caregiver  - Include patient/family/discharge partn for bleeding and/or hematoma  - Assess quality of pulses, skin color and temperature  - Assess for signs of decreased coronary artery perfusion - ex.  Angina  - Evaluate fluid balance, assess for edema, trend weights  Outcome: Progressing  Goal: Absence of gait  - Educate and engage patient/family in tolerated activity level and precautions  - Recommend use of total lift for transfers   Outcome: Progressing     Problem: Impaired Activities of Daily Living  Goal: Achieve highest/safest level of independence i

## 2020-07-30 NOTE — OCCUPATIONAL THERAPY NOTE
OCCUPATIONAL THERAPY TREATMENT NOTE - INPATIENT     Room Number: 831/407-S  Session: 2   Number of Visits to Meet Established Goals: Trial    Presenting Problem: pneumonia, sepsis, fall, SHWETHA, somnolence    History related to current admission: Patient is a SAT[employer]                ACTIVITIES OF DAILY LIVING ASSESSMENT  AM-PAC ‘6-Clicks’ Inpatient Daily Activity Short Form  How much help from another person does the patient currently need…  -   Putting on and taking off regular lower body clothing?: Total Recommendations: Long term care  OT Device Recommendations: TBD    PLAN  OT Treatment Plan: Balance activities; Energy conservation/work simplification techniques;ADL training;Functional transfer training; Endurance training;UE strengthening/ROM; Cognitive re

## 2020-07-30 NOTE — PROGRESS NOTES
[facility]  Progress Note    Marylu Johnston Patient Status:  Inpatient    10/3/1935 MRN [id number]   [facility] 4SW-A Attending Deidre Hernandez MD   [facility] Day # 9 PCP Jasmine Cardoza MD         SUBJECTIVE:        Subjective:  Marylu Johnston Recent Labs   Lab 07/24/20  0453 07/25/20  0500 07/26/20  0514 07/27/20  1409 07/28/20  0355 07/29/20  0539 07/29/20  1218 07/30/20  0438   * 147* 149* 144  --   --  144  --    K 3.9 3.6 4.1 4.6  --   --  4.0  --    * 121* 122* 120*  -- Patient brought in by EMS after falling out of bed today. FINDINGS:  The externally rotated view is suboptimal.  Severe osteoarthritis within the right glenohumeral joint.   The right humeral head is high riding which may relate to rotator cuff pathology on the right thalamus as well as the right caudate nucleus. SINUSES:           No sign of acute sinusitis. MASTOIDS:          No sign of acute inflammation. SKULL:             No evidence for fracture or osseous abnormality.  OTHER:             Small amoun periorbital soft tissues. CONCLUSION:  1. No acute intracranial hemorrhage or hydrocephalus. 2. Mild chronic small vessel ischemic disease with small chronic infarcts within the head of the right caudate and right thalamus.  If there is clinical concern fo cm.  This could be best assessed with thyroid ultrasound if clinically indicated. No new lymphadenopathy is seen. Visualized nasopharynx, oropharynx and hypopharynx appear patent.   The visualized chest has a stable appearance from recent prior exam.  Ple stenosis is identified. Assessment the cervical spinal canal is limited secondary to CT technique. No gross fracture is seen. On image 123 of series 3, there is a 3 mm nodule within left lung apex. Degenerative changes noted at the C1-C2 articulation. lobe suggesting pneumonia. Follow-up suggested. Marked degenerative change of the left glenohumeral joint space and to a lesser extent the right.   CONCLUSION:  Compared to the study of 6/12/2020 there is new airspace disease in the lateral aspect of the Submandibular glands are not well visualized and may be  atrophic, correlate clinically. The thyroid gland is small.  Along the isthmus of the thyroid gland is an approximately 1.4 x 1.7 cm low-density nodule, thyroid sonogram would be helpful for further nondistended transverse colon. URINARY BLADDER:  Stable right posterior bladder diverticulum. PELVIC NODES:  No adenopathy. PELVIC ORGANS:  Pelvic organs appropriate for patient age. BONES:  Marked degenerative change.   DISH involves the thoracic spine stage 1 through stage 4 chronic kidney disease, or unspecified chronic kidney disease     Edema     Gout     Pseudophakia     UTI (urinary tract infection), bacterial     Vitamin D deficiency, unspecified     Late onset Alzheimer's disease with behavioral due to underlying infection–supportive care IV antibiotics IV fluids will follow up     Left upper extremity DVT–Eliquis 5 twice a day      Sepsis Shock with MRSA bacteremia/Right parotitis/possible HCAP/UTI–Pan cultures, supportive care, IV antibiotics, i

## 2020-07-30 NOTE — PLAN OF CARE
Pt is aox1, VSS, afebrile. Confused baseline. RA. Tele NSR with PAC + PVC. Total lift, QID accucheck. Left arm precautions for DVT. Arms are weeping so there are chucks underneath. Left arm is elevated. PICC line draws well. Saline locked.  Puree diet with limitations  - Instruct pt to call for assistance with activity based on assessment  - Modify environment to reduce risk of injury  - Provide assistive devices as appropriate  - Consider OT/PT consult to assist with strengthening/mobility  - Encourage toil

## 2020-07-30 NOTE — PROGRESS NOTES
INFECTIOUS DISEASE PROGRESS NOTE    Shae Richardson Patient Status:  Inpatient    10/3/1935 MRN [id number]   [facility] 4SW-A Attending Adonis Sanchez MD   [facility] Day # 9 PCP Stephen Hickey Rectal, Daily PRN  •  ondansetron HCl (ZOFRAN) injection 4 mg, 4 mg, Intravenous, Q6H PRN  •  Metoclopramide HCl (REGLAN) injection 5 mg, 5 mg, Intravenous, Q8H PRN    Review of Systems:  Unable to complete.      Physical Exam:    General: No acute distress 1.3*  --  1.1*  --   --   --  1.3*  --    *   < > 149* 144  --   --  144  --    K 3.9   < > 4.1 4.6  --   --  4.0  --    *   < > 122* 120*  --   --  120*  --    CO2 19.0*   < > 23.0 19.0*  --   --  24.0  --    ALKPHO 146*  --  103  --   --   -- PRBCs.  11. Thrombocytopenia. Stable with slight trend upward. 12. Cardiomyopathy, EF 40%  13. DVT, LUE. On Eliquis. PLAN:    - continue IV vancomycin for now. Platelets stable.  Will continue to monitor for a rash and worsening thrombocytopenia, which

## 2020-07-30 NOTE — CM/SW NOTE
AJIT consult order noted for hospice. Chart reviewed and per palliative care note, will allow son to think about [address] discussion from today and will revisit tomorrow. ajit following.

## 2020-07-31 NOTE — DIETARY NOTE
[facility]    NUTRITION FOLLOW-UP ASSESSMENT    NUTRITION DIAGNOSIS/PROBLEM:    Increased nutrient needs related to wound healing as evidenced by stage 2 pressure ulcer on sacrum and unstageable pressure ulcer on right heel.      Unintentional wt loss 62.2 kg (137 lb 2 oz)   07/22/20 0557 61.2 kg (134 lb 14.7 oz)     Wt Readings from Last 10 Encounters:  07/31/20 : 72.2 kg (159 lb 2.8 oz)  06/12/20 : 71.4 kg (157 lb 6.5 oz)  04/16/20 : 71.4 kg (157 lb 6.5 oz)  08/05/19 : 69.5 kg (153 lb 3.2 oz)  06/14/1

## 2020-07-31 NOTE — PLAN OF CARE
Problem: PAIN - ADULT  Goal: Verbalizes/displays adequate comfort level or patient's stated pain goal  Description  INTERVENTIONS:  - Encourage pt to monitor pain and request assistance  - Assess pain using appropriate pain scale  - Administer analgesics appropriate  - Identify discharge learning needs (meds, wound care, etc)  - Arrange for interpreters to assist at discharge as needed  - Consider post-discharge preferences of patient/family/discharge partner  - Complete POLST form as appropriate  - Assess monitoring, monitor vital signs, obtain 12 lead EKG if indicated  - Evaluate effectiveness of antiarrhythmic and heart rate control medications as ordered  - Initiate emergency measures for life threatening arrhythmias  - Monitor electrolytes and administe participate in ADLs to maximize function  - Promote sitting position while performing ADLs such as feeding, grooming, and bathing  - Educate and encourage patient/family in tolerated functional activity level and precautions during self-care     Outcome: P

## 2020-07-31 NOTE — CM/SW NOTE
Care Progression Note:  Active Acute Medical Issue:   82 y/o female with septic shock with MRSA bacteremia  Possible HCAP, UTI, Trevon on CKD Encephalopathy, improving  Cardiomyopathy, with EF 40%  DVT left upper arm, on eliquis  sacral pressure ulcer, wound

## 2020-07-31 NOTE — PROGRESS NOTES
INFECTIOUS DISEASE PROGRESS NOTE    Radha Parrish Patient Status:  Inpatient    10/3/1935 MRN [id number]   [facility] 4SW-A Attending Mike Sheets MD   Hosp Day # 10 PCP Rory Healy mg, 4 mg, Intravenous, Q6H PRN  •  Metoclopramide HCl (REGLAN) injection 5 mg, 5 mg, Intravenous, Q8H PRN    Review of Systems:  Unable to complete. Physical Exam:    General: No acute distress. Awake. Resting comfortably in bed.   Vital signs: Blood pr --    K 4.1 4.6  --  4.0  --  3.9  --    * 120*  --  120*  --  118*  --    CO2 23.0 19.0*  --  24.0  --  24.0  --    ALKPHO 103  --   --  92  --  97  --    AST 20  --   --  19  --  13*  --    ALT 16  --   --  16  --  13  --    BILT 0.5  --   --  0.5 vancomycin for now. Platelets improving. No rash.  Will continue to monitor for a rash and worsening thrombocytopenia, which she developed last admission while on vanco.   - monitor respiratory status closely  - follow cr  - monitor hgb and platelets closel

## 2020-07-31 NOTE — PROGRESS NOTES
[facility]  Progress Note    Ismael Rock Patient Status:  Inpatient    10/3/1935 MRN [id number]   [facility] 4SW-A Attending Katerin Kelly MD   Hosp Day # 10 PCP Lennox Ahr, MD         SUBJECTIVE:        Subjective:  Ghislaine Graham 07/28/20  1350 07/29/20  1218 07/31/20  0604   WBC 14.1* 8.4 7.1  --  7.8 9.7   HGB 8.4* 7.2* 6.7* 8.9* 8.8* 9.6*   MCV 93.7 95.1 95.7  --  93.6 93.9   .0* 107.0* 111.0*  --  114.0* 118.0*       Recent Labs   Lab 07/25/20  0500 07/26/20  0514 07/27/ (min 2 Views), Right (cpt=73030)    Result Date: 7/4/2020  PROCEDURE:  XR SHOULDER, COMPLETE (MIN 2 VIEWS), RIGHT (CPT=73030)     TECHNIQUE:  Multiple views were obtained. COMPARISON:  None.   INDICATIONS:  fall, on thinners  PATIENT STATED HISTORY: (As tr intracranial hemorrhage. Overall stable appearance of scattered areas of hypoattenuation within the subcortical, deep and periventricular white matter. These most likely represent mild chronic microvascular ischemic changes.   More focal area of probable gray-white matter differentiation is intact. There is no acute intracranial hemorrhage or extra-axial fluid collection. There is no evident fracture. Small amount of aerated secretions within the sphenoid sinus.   There are prominent contusions within t or drainable fluid collection. No stone is seen. There may be some mild asymmetric enlargement of the right submandibular gland as well. This appears stable. Thyroid gland is diminutive and incompletely assessed.   Probable underlying nodule is again no motion degradation. Repeat imaging was performed. This repeat imaging demonstrates persistent motion degradation. There is cervical lordosis. The vertebral body heights and disc heights are maintained.   There are scattered endplate degenerative changes changes are noted in the right lung base unchanged. No venous congestion. Widening of the superior mediastinum to the right of midline unchanged most likely tortuous vessels or enlarged right lobe of thyroid.   New small amount of airspace disease in the and without mass. SINUSES:  Limited views show no significant fluid or mucosal thickening. NECK GLANDS:  The left parotid gland is unremarkable.   The right parotid gland is diffusely enlarged with infiltration of the overlying adjacent fat consistent wit mildly prominent small bowel loops are present with nonspecific air-fluid levels. Diverticular disease. A short segment of nondistended transverse colon extends into a ventral hernia, stable. Appendectomy. No free fluid.  ABDOMINAL WALL:  Umbilical doreen arthritis, left     Glenohumeral arthritis, right     Poor short term memory     Primary insomnia     Apathy     Anxiety     Tremor     Acute renal failure (HCC)     Age-related nuclear cataract of both eyes     Benign essential hypertension     Hypertensi etiologies. 2. Prominent interstitial markings bilaterally suggesting possible underlying fibrosis, pneumonitis or pneumonia.   3. Small bilateral pleural effusions.       SHWETHA (acute kidney injury) ([facility] [address]  Possibly due to poor Oral intake due to worsening d

## 2020-07-31 NOTE — PLAN OF CARE
Pt is aox1, VSS, low grade fever this evening. Atempted to give tylenol in applesauce but pt kept spitting it out. Placed ice packs under pt armpits and behind neck. Confused baseline. RA. Q2 turn. Tele NSR with PAC + PVC. Total lift, QID accucheck.  Left a feedings (90 degrees preferred)  - Offer food and liquids at a slow rate  - Encourage small bites of food and small sips of liquid  - Offer pills one at a time, crush or deliver with applesauce as needed  - Discontinue feeding and notify MD (or speech path

## 2020-07-31 NOTE — CONSULTS
[facility]  Report of Inpatient Wound Care Consultation     Dudley Erickson Patient Status:  Inpatient    10/3/1935 MRN [id number]   [facility] 5NW-A Attending Courtney Hidalgo MD   Hosp Day # 10 PCP Elicia Weber MD     REASON FOR CON 1.14*  --   --    BUN 51*  --  43*  --   --   --   --   --  34*  --   --   --   --  27*  --   --   --    *  --  150*  --   --   --   --   --  85  --   --   --   --  89  --   --   --    CA 7.8*  --  8.2*  --   --   --   --   --  7.6*  --   --   -- please call the Inpatient Wound Care pager at #[phone]. Time Spent 45 Minutes. Thank you,    Johan Ng.  Laura Arce, PT, MPT  [facility] [address]  [address]  [place], [address]  [phone]

## 2020-07-31 NOTE — CM/SW NOTE
Plan of care discussed with PC and MD. Family does not seem to be ready for hospice at this time.  sw to follow for further dc planning

## 2020-08-01 NOTE — DIETARY NOTE
Clinical Nutrition  Calorie count started - envelope hanging on door and will be monitored daily by RD.    Diet order: 75 gm CHO controlled, NTL, Pureed +Magic Cup BID  7/31:Breakfast: 5% of meal tray (pancake, omelet, applesauce)  Lunch:  10% of tray  Dinn

## 2020-08-01 NOTE — PROGRESS NOTES
[facility]  Progress Note    Elan Ardon Patient Status:  Inpatient    10/3/1935 MRN [id number]   [facility] 4SW-A Attending Michael Perez MD   [facility] Day # 6 PCP Vladimir Boone MD         SUBJECTIVE:        Subjective:  Logan Bowling 07/31/20  0605 08/01/20  0515   * 144  --  144  --  146*  --  146*   K 4.1 4.6  --  4.0  --  3.9  --  3.6   * 120*  --  120*  --  118*  --  118*   CO2 23.0 19.0*  --  24.0  --  24.0  --  24.0   BUN 51* 43*  --  34*  --  27*  --  26*   LOUIE today.    FINDINGS:  The externally rotated view is suboptimal.  Severe osteoarthritis within the right glenohumeral joint. The right humeral head is high riding which may relate to rotator cuff pathology. No definitive dislocation.   Degenerative changes nucleus. SINUSES:           No sign of acute sinusitis. MASTOIDS:          No sign of acute inflammation. SKULL:             No evidence for fracture or osseous abnormality.  OTHER:             Small amount of soft tissue swelling overlying the right front intracranial hemorrhage or hydrocephalus. 2. Mild chronic small vessel ischemic disease with small chronic infarcts within the head of the right caudate and right thalamus.  If there is clinical concern for acute ischemia/infarction, an MRI of the brain wou ultrasound if clinically indicated. No new lymphadenopathy is seen. Visualized nasopharynx, oropharynx and hypopharynx appear patent. The visualized chest has a stable appearance from recent prior exam.  Please refer to that study for further details. spinal canal is limited secondary to CT technique. No gross fracture is seen. On image 123 of series 3, there is a 3 mm nodule within left lung apex. Degenerative changes noted at the C1-C2 articulation. CONCLUSION:  1.  No fracture is seen within the l Marked degenerative change of the left glenohumeral joint space and to a lesser extent the right. CONCLUSION:  Compared to the study of 6/12/2020 there is new airspace disease in the lateral aspect of the left upper lobe.   There is decrease in the interst may be  atrophic, correlate clinically. The thyroid gland is small. Along the isthmus of the thyroid gland is an approximately 1.4 x 1.7 cm low-density nodule, thyroid sonogram would be helpful for further evaluation as clinically indicated.   LYMPH NODES: Stable right posterior bladder diverticulum. PELVIC NODES:  No adenopathy. PELVIC ORGANS:  Pelvic organs appropriate for patient age. BONES:  Marked degenerative change. DISH involves the thoracic spine. CONCLUSION:  1.  Bilateral, predominantly upper chronic kidney disease with stage 1 through stage 4 chronic kidney disease, or unspecified chronic kidney disease     Edema     Gout     Pseudophakia     UTI (urinary tract infection), bacterial     Vitamin D deficiency, unspecified     Late onset Alzheime general decline as well as due to underlying infection–supportive care IV antibiotics IV fluids will follow up     Left upper extremity DVT–Eliquis 5 twice a day      Sepsis Shock with MRSA bacteremia/Right parotitis/possible HCAP/UTI–Pan cultures, support Tom Giang MD

## 2020-08-01 NOTE — CONSULTS
[address] [facility] dosing service    Follow-up Pharmacokinetic Consult for Vancomycin Dosing     Freddie Sanchez is a 81year old patient who is being treated for MRSA bacteremia .    Patient is on day 11 of Vancomycin and is currently receiving 750 mg IV Q36 hours pharmacokinetics,  and renal function)    2. Will re-check Vancomycin trough levels prior to 3rd dose. Goal trough level 15-20 ug/mL. 3.  Pharmacy will need Scr daily while on Vancomycin to assess renal function.     4.  Pharmacy will follow and adjust

## 2020-08-02 NOTE — PROGRESS NOTES
[facility]  Progress Note    Jackelin Johnson Patient Status:  Inpatient    10/3/1935 MRN [id number]   [facility] 4SW-A Attending Ishmael Palma MD   [address] Day # 12 PCP Kendrick Wiggins MD         SUBJECTIVE:        Subjective:  Caprice Leon 08/02/20  0506     --  144  --  146*  --  146* 144   K 4.6  --  4.0  --  3.9  --  3.6 4.2   *  --  120*  --  118*  --  118* 118*   CO2 19.0*  --  24.0  --  24.0  --  24.0 24.0   BUN 43*  --  34*  --  27*  --  26* 23*   CREATSERUM 1.40*  1.40* The externally rotated view is suboptimal.  Severe osteoarthritis within the right glenohumeral joint. The right humeral head is high riding which may relate to rotator cuff pathology. No definitive dislocation.   Degenerative changes are noted at the rig No sign of acute sinusitis. MASTOIDS:          No sign of acute inflammation. SKULL:             No evidence for fracture or osseous abnormality.  OTHER:             Small amount of soft tissue swelling overlying the right frontal calvarium is stable hydrocephalus. 2. Mild chronic small vessel ischemic disease with small chronic infarcts within the head of the right caudate and right thalamus.  If there is clinical concern for acute ischemia/infarction, an MRI of the brain would be recommended for bety indicated. No new lymphadenopathy is seen. Visualized nasopharynx, oropharynx and hypopharynx appear patent. The visualized chest has a stable appearance from recent prior exam.  Please refer to that study for further details.           CONCLUSION:  No s secondary to CT technique. No gross fracture is seen. On image 123 of series 3, there is a 3 mm nodule within left lung apex. Degenerative changes noted at the C1-C2 articulation. CONCLUSION:  1.  No fracture is seen within the limitations of the motion change of the left glenohumeral joint space and to a lesser extent the right. CONCLUSION:  Compared to the study of 6/12/2020 there is new airspace disease in the lateral aspect of the left upper lobe.   There is decrease in the interstitial changes in the correlate clinically. The thyroid gland is small. Along the isthmus of the thyroid gland is an approximately 1.4 x 1.7 cm low-density nodule, thyroid sonogram would be helpful for further evaluation as clinically indicated.   LYMPH NODES:  No pathological- posterior bladder diverticulum. PELVIC NODES:  No adenopathy. PELVIC ORGANS:  Pelvic organs appropriate for patient age. BONES:  Marked degenerative change. DISH involves the thoracic spine. CONCLUSION:  1.  Bilateral, predominantly upper lobe nodules kidney disease, or unspecified chronic kidney disease     Edema     Gout     Pseudophakia     UTI (urinary tract infection), bacterial     Vitamin D deficiency, unspecified     Late onset Alzheimer's disease with behavioral disturbance (HCC)     Anemia infection–supportive care IV antibiotics IV fluids will follow up     Left upper extremity DVT–Eliquis 5 twice a day      Sepsis Shock with MRSA bacteremia/Right parotitis/possible HCAP/UTI–Pan cultures, supportive care, IV antibiotics, infectious disease

## 2020-08-03 NOTE — DIETARY NOTE
Clinical Nutrition  Calorie count started - envelope hanging on door and will be monitored daily by RD.    Diet order: 75 gm CHO controlled, NTL, Pureed +Magic Cup BID  7/31:  Breakfast: 5% of meal tray (pancake, omelet, applesauce)  Lunch:  10% of tray  Halley Buckle

## 2020-08-03 NOTE — PROGRESS NOTES
INFECTIOUS DISEASE PROGRESS NOTE    Ritchie Karens Patient Status:  Inpatient    10/3/1935 MRN [id number]   [facility] 4SW-A Attending Alize Kidd MD   [facility] Day # 15 PCP Jeanette Le (ZOFRAN) injection 4 mg, 4 mg, Intravenous, Q6H PRN  •  Metoclopramide HCl (REGLAN) injection 5 mg, 5 mg, Intravenous, Q8H PRN    Review of Systems:  Unable to complete. Physical Exam:    General: No acute distress. Awake. Resting comfortably in bed. 9* 10*  --    BILT 0.5  --  0.4 0.4  --    TP 5.8*  --  4.9* 4.7*  --     < > = values in this interval not displayed.      No results for input(s): Darlene Duong, [address] [facility], [address], [address], [address], [address] [facility], [address] [place] [address], [address], [facility] [address], [address] Felicie Edelson closely  - follow cr  - monitor hgb and platelets closely  - off load  - ENT eval noted  - palliative care eval noted  - surgery following, noted plans for debridement today.   - will need IV abx (vanco) on d/c for 6 weeks (EOT 9/3/2020) followed by surveil

## 2020-08-03 NOTE — PROGRESS NOTES
[facility]  Progress Note    Willis Rojas Patient Status:  Inpatient    10/3/1935 MRN [id number]   [facility] 4SW-A Attending Tonio Cramer MD   [address] Day # 15 PCP Lyndsey Gallegos MD         SUBJECTIVE:        Subjective:  Celso Macdonald 08/01/20  0515 08/02/20  0506 08/03/20  0515     --  144  --  146*  --  146* 144  --    K 4.6  --  4.0  --  3.9  --  3.6 4.2  --    *  --  120*  --  118*  --  118* 118*  --    CO2 19.0*  --  24.0  --  24.0  --  24.0 24.0  --    BUN 43*  --  34* Technologist)   Patient brought in by EMS after falling out of bed today. FINDINGS:  The externally rotated view is suboptimal.  Severe osteoarthritis within the right glenohumeral joint.   The right humeral head is high riding which may relate to rotato infarct centered on the right thalamus as well as the right caudate nucleus. SINUSES:           No sign of acute sinusitis. MASTOIDS:          No sign of acute inflammation. SKULL:             No evidence for fracture or osseous abnormality.  OTHER: and right periorbital soft tissues. CONCLUSION:  1. No acute intracranial hemorrhage or hydrocephalus. 2. Mild chronic small vessel ischemic disease with small chronic infarcts within the head of the right caudate and right thalamus.  If there is clinical 1.5 x 1.9 cm. This could be best assessed with thyroid ultrasound if clinically indicated. No new lymphadenopathy is seen. Visualized nasopharynx, oropharynx and hypopharynx appear patent.   The visualized chest has a stable appearance from recent prior canal stenosis is identified. Assessment the cervical spinal canal is limited secondary to CT technique. No gross fracture is seen. On image 123 of series 3, there is a 3 mm nodule within left lung apex.   Degenerative changes noted at the C1-C2 articula upper lobe suggesting pneumonia. Follow-up suggested. Marked degenerative change of the left glenohumeral joint space and to a lesser extent the right.   CONCLUSION:  Compared to the study of 6/12/2020 there is new airspace disease in the lateral aspect o process. Submandibular glands are not well visualized and may be  atrophic, correlate clinically. The thyroid gland is small.  Along the isthmus of the thyroid gland is an approximately 1.4 x 1.7 cm low-density nodule, thyroid sonogram would be helpful fo segment of nondistended transverse colon. URINARY BLADDER:  Stable right posterior bladder diverticulum. PELVIC NODES:  No adenopathy. PELVIC ORGANS:  Pelvic organs appropriate for patient age. BONES:  Marked degenerative change.   DISH involves the tho Hypertensive chronic kidney disease with stage 1 through stage 4 chronic kidney disease, or unspecified chronic kidney disease     Edema     Gout     Pseudophakia     UTI (urinary tract infection), bacterial     Vitamin D deficiency, unspecified     Late o dementia and general decline as well as due to underlying infection–supportive care IV antibiotics IV fluids will follow up     Left upper extremity DVT–Eliquis 5 twice a day      Sepsis Shock with MRSA bacteremia/Right parotitis/possible HCAP/UTI–Pan cult care team        WEAKNESS- PT OT EVAL AND TREAT  DISPOSITION PER   See tests ordered,  Available and radiology reviewed  All consultant notes reviewed  Discussed with nursing on floor  dvt prophylaxis reviewed  PT and/or OT  Binh Shen MD

## 2020-08-03 NOTE — PROGRESS NOTES
Alert, oriented to self. Mostly non-verbal, single words on occasion, delayed responses, does not follow commands. Room air. On tele, NSR. Eliquis. LUE DVT, 2+ edema. RUE 1+ edema, elevated on pillows. Briefed, incontinent.  Purewick in place, clear yellow

## 2020-08-03 NOTE — PROGRESS NOTES
[facility]  Report of Consultation    Sybiljudith Garcia Patient Status:  Inpatient    10/3/1935 MRN [id number]   Location [address] [facility] Attending Porfirio Prado MD   Hosp Day # 15 PCP Raymond Moise MD     Date of consultati dextrose 50 % injection 50 mL, 50 mL, Intravenous, Q15 Min PRN **OR** glucose (DEX4) oral liquid 30 g, 30 g, Oral, Q15 Min PRN **OR** Glucose-Vitamin C (DEX-4) chewable tab 8 tablet, 8 tablet, Oral, Q15 Min PRN  •  lactated ringers infusion, , Intravenous, [MAR Hold] bisacodyl (DULCOLAX) rectal suppository 10 mg, 10 mg, Rectal, Daily PRN  •  [MAR Hold] ondansetron HCl (ZOFRAN) injection 4 mg, 4 mg, Intravenous, Q6H PRN  •  [MAR Hold] Metoclopramide HCl (REGLAN) injection 5 mg, 5 mg, Intravenous, Q8H PRN    R No lower extremity edema noted. Without clubbing or cyanosis. Skin: Diffuse edema is noted. In the sacrum there is a 6 x 6 cm area of necrosis. There is some underlying fluctuance. Neurologic: Cranial nerves are grossly intact.   Alert and oriented x due to the patient's nonambulatory status and multiple comorbidities was also reviewed. We will does not have any further questions at this time and wishes for his mother to proceed with surgery as discussed        Discussed:    The potential treatment opt

## 2020-08-03 NOTE — ANESTHESIA PREPROCEDURE EVALUATION
PRE-OP EVALUATION    Patient Name: Libra Rodriguez    Pre-op Diagnosis: Infection [B99.9]    Procedure(s):  DEBRIDEMENT OF SACRAL DECUBITUS ULCER    Surgeon(s) and Role:     Terra Homans, MD - Primary    Pre-op vitals reviewed.   Temp: 97.4 °F (36.3 °C) (DEX-4) chewable tab 8 tablet, 8 tablet, Oral, Q15 Min PRN  [COMPLETED] Albumin Human (ALBUMINAR) 5 % solution 250 mL, 250 mL, Intravenous, Once  famoTIDine (PEPCID) tab 20 mg, 20 mg, Oral, Nightly    Or  famoTIDine (PEPCID) injection 20 mg, 20 mg, Ce Phelan famotidine 40 MG Oral Tab, Take 40 mg by mouth nightly., Disp: , Rfl:   mirtazapine 15 MG Oral Tab, Take 15 mg by mouth nightly., Disp: , Rfl:   Donepezil HCl 10 MG Oral Tab, Take 10 mg by mouth nightly., Disp: , Rfl:   Gauze Pads & Dressings (OPTIFOAM) aorta diameter was 3.6cm. 4. Mitral valve: There was mild regurgitation. 5. Left atrium: The left atrial volume was mildly increased. 6. Right ventricle: The cavity size was mildly increased. Systolic function     was mildly reduced.   7. Right atrium: T 26.3 (L) 08/02/2020    MCV 93.9 08/02/2020    MCH 28.6 08/02/2020    MCHC 30.4 (L) 08/02/2020    RDW 15.3 (H) 08/02/2020    .0 (L) 08/02/2020     Lab Results   Component Value Date     08/02/2020    K 4.2 08/02/2020     (H) 08/02/2020

## 2020-08-04 NOTE — DIETARY NOTE
Clinical Nutrition  Calorie count started -intake monitored daily by RD.    Diet order: 75 gm CHO controlled, NTL, Pureed +Magic Cup BID  7/31:  Breakfast: 5% of meal tray (pancake, omelet, applesauce)  Lunch:  10% of tray  Dinner:  5%  Pt with very poor in

## 2020-08-04 NOTE — OPERATIVE REPORT
[facility]  Operative Note    Dudley Dre Location: OR   CSN [id number] MRN [id number]   Admission Date 7/21/2020 Operation Date 8/3/2020   Attending Physician Courtney Hidalgo MD Operating Physician Margo Holder MD     Date of procedure:   August 3 procedure were discussed in detail with the patient's son and Christine Tracey including but not limited to bleeding specifically as the patient is anticoagulated on Eliquis, infection, seroma/ hematoma formation, postoperative wound complications.   The pat None    Ramón Wallace MD

## 2020-08-04 NOTE — ANESTHESIA PROCEDURE NOTES
Airway  Date/Time: 8/3/2020 6:48 PM  Urgency: elective    Airway not difficult    General Information and Staff    Patient location during procedure: OR  Anesthesiologist: Margareth Patterson MD  Performed: anesthesiologist     Indications and Patient Condition

## 2020-08-04 NOTE — ANESTHESIA POSTPROCEDURE EVALUATION
30 [facility] Patient Status:  Inpatient   Age/Gender 81year old female MRN [id number]   [facility] SURGERY Attending Mike Sheets, [address] [facility] [address] Day # 15 PCP Yomi James MD       Anesthesia Post-op Note    Procedure(s):

## 2020-08-04 NOTE — PROGRESS NOTES
Alert, oriented to self, delayed responses, says a few words here and there and sometimes follows simple commands. Room air, aspiration precautions. Pureed, NTL, poor appetite.  Sacral wound, dressing changed today with wound care, daily changes starting to

## 2020-08-04 NOTE — PLAN OF CARE
Problem: AMS  Data: Ax01. Self. On telemetry, NSR. , wean down to room air. 02 sats WNL. Afebrile. No verbalized pain or facial grimace. Incontinent of bowel and bladder. Purewick applied. Eneida lift. Wet-to dry dressing on sacral wound.  Diabetic, Accu consult to assist with strengthening/mobility  - Encourage toileting schedule  Outcome: Progressing     Problem: DISCHARGE PLANNING  Goal: Discharge to home or other facility with appropriate resources  Description  INTERVENTIONS:  - Identify barriers to d hemodynamic stability  - Monitor arterial and/or venous puncture sites for bleeding and/or hematoma  - Assess quality of pulses, skin color and temperature  - Assess for signs of decreased coronary artery perfusion - ex.  Angina  - Evaluate fluid balance, a and stability  - Promote increasing activity/tolerance for mobility and gait  - Educate and engage patient/family in tolerated activity level and precautions  - Recommend use of total lift for transfers   Outcome: Progressing     Problem: Impaired Activiti

## 2020-08-04 NOTE — WOUND PROGRESS NOTE
[facility]  Report of Inpatient Wound Care Progress Note    Shae Richardson Patient Status:  Inpatient    10/3/1935 MRN [id number]   [facility] 5NW-A Attending Adonis Sanchez MD   Hosp Day # 15 PCP Stephen Hickey MD       SUBJECTIVE:  [employer] Follow up Imaging and Microbiology results:   See EMR    ASSESSMENT/ RECOMMENDATIONS:  Patient seen for follow up, s/p I&D of the sacral wound by Dr. Deni Lawler on 8/3/20, re-consulted for management. Wound now measures post surgically at (13.4cm x 12.

## 2020-08-04 NOTE — PROGRESS NOTES
[facility] dosing service    Follow-up Pharmacokinetic Consult for Vancomycin Dosing     Hawa Coombs is a 81year old patient who is being treated for Septic shock with MRSA bacteremia.    Patient is on day 14 of Vancomycin and is currently receiving 75 (based on Trough of 18.5 ug/mL, pharmacokinetics, actual body weight of 72 kg and renal function). 2.  Will re-check Vancomycin trough level in 5-7 days. Goal trough level 15-20 ug/mL.     3.  Pharmacy will need Scr daily while on Vancomycin to assess r

## 2020-08-04 NOTE — DIETARY NOTE
[facility]    NUTRITION FOLLOW-UP ASSESSMENT    NUTRITION DIAGNOSIS/PROBLEM:    Increased nutrient needs related to wound healing as evidenced by stage 2 pressure ulcer on sacrum and unstageable pressure ulcer on right heel.      Unintentional wt loss CKD3. ANTHROPOMETRICS:  Ht: 160 cm (5' 3\")  Wt: 82.6 kg (182 lb). BMI: Body mass index is 32.24 kg/m².   IBW: 52.3 kg    WEIGHT HISTORY:     Patient Weight for the past 72 hrs:   Weight   07/21/20 1015 71.4 kg (157 lb 6.5 oz)   07/21/20 1700 62.2 kg (

## 2020-08-04 NOTE — PROGRESS NOTES
INFECTIOUS DISEASE PROGRESS NOTE    Marylu Fly Patient Status:  Inpatient    10/3/1935 MRN [id number]   [facility] 4SW-A Attending Deidre Hernandez MD   [facility] Day # 14 ARACELIS Rivera (REGLAN) injection 5 mg, 5 mg, Intravenous, Q8H PRN    Review of Systems:  Unable to complete. Physical Exam:    General: No acute distress. Awake. Resting comfortably in bed.   Vital signs: Blood pressure (!) 179/82, pulse 73, temperature 97.5 °F (36.4 [address], [address], [address], [address] [place], NITRITE, LEUUR, WBCUR, RBCUR, BACUR, EPIUR in the last 168 hours. Microbiology    Reviewed in EMR,    Radiology: Reviewed. ASSESSMENT:    1. Septic shock with MRSA bacteremia.  Although could be component hgb and platelets closely  - off load  - ENT eval noted  - palliative care eval noted  - surgery following  - wound care following, eval noted--> will try to eval with next dressing change.   - will need IV abx (vanco and likely a second agent for sacral de

## 2020-08-04 NOTE — PROGRESS NOTES
[facility]  Progress Note    Hawa Coombs Patient Status:  Inpatient    10/3/1935 MRN [id number]   [facility] 4SW-A Attending Tabatha Shelton MD   [facility] Day # 15 PCP Emmy Leon MD         SUBJECTIVE:        Subjective:  Cam[address] 26* 23*  --  20*   CREATSERUM 1.21*   < > 1.12* 1.14* 0.96  0.96 0.81  0.81 0.88 0.87  0.87   CA 7.6*  --  8.0*  --  7.7* 7.8*  --  8.0*   GLU 85  --  89  --  90 86  --  81    < > = values in this interval not displayed.        Recent Labs   Lab 07/29/20  1 rotated view is suboptimal.  Severe osteoarthritis within the right glenohumeral joint. The right humeral head is high riding which may relate to rotator cuff pathology. No definitive dislocation. Degenerative changes are noted at the right [facility] joint.   Tico Ruano of acute sinusitis. MASTOIDS:          No sign of acute inflammation. SKULL:             No evidence for fracture or osseous abnormality.  OTHER:             Small amount of soft tissue swelling overlying the right frontal calvarium is stable from the prio Mild chronic small vessel ischemic disease with small chronic infarcts within the head of the right caudate and right thalamus. If there is clinical concern for acute ischemia/infarction, an MRI of the brain would be recommended for further evaluation.  3. lymphadenopathy is seen. Visualized nasopharynx, oropharynx and hypopharynx appear patent. The visualized chest has a stable appearance from recent prior exam.  Please refer to that study for further details.           CONCLUSION:  No significant change f technique. No gross fracture is seen. On image 123 of series 3, there is a 3 mm nodule within left lung apex. Degenerative changes noted at the C1-C2 articulation. CONCLUSION:  1. No fracture is seen within the limitations of the motion artifact.   Genet Phan glenohumeral joint space and to a lesser extent the right. CONCLUSION:  Compared to the study of 6/12/2020 there is new airspace disease in the lateral aspect of the left upper lobe.   There is decrease in the interstitial changes in the lingular segment a The thyroid gland is small. Along the isthmus of the thyroid gland is an approximately 1.4 x 1.7 cm low-density nodule, thyroid sonogram would be helpful for further evaluation as clinically indicated.   LYMPH NODES:  No pathological-appearing or enlarged diverticulum. PELVIC NODES:  No adenopathy. PELVIC ORGANS:  Pelvic organs appropriate for patient age. BONES:  Marked degenerative change. DISH involves the thoracic spine. CONCLUSION:  1. Bilateral, predominantly upper lobe nodules.   Given the histo kidney disease, or unspecified chronic kidney disease     Edema     Gout     Pseudophakia     UTI (urinary tract infection), bacterial     Vitamin D deficiency, unspecified     Late onset Alzheimer's disease with behavioral disturbance (HCC)     Anemia ([address]  Possibly due to poor Oral intake due to worsening dementia and general decline as well as due to underlying infection–supportive care IV antibiotics IV fluids will follow up     Left upper extremity DVT–Eliquis 5 twice a day      Sepsis Shock with M disposition per   Per nutrition notes eating 47% to 49%, very poor by mouth intake and not meeting Established calorie or protein intake needs     Sacral and heel dressing Per nursing and wound care team, surgery consult requested per wound ca

## 2020-08-04 NOTE — PROGRESS NOTES
[facility]  Progress Note    Marilyn Mcghee Patient Status:  Inpatient    10/3/1935 MRN [id number]   [facility] 5NW-A Attending Wanda Juares MD   [facility] Day # 15 PCP Karina Bowser MD     Subjective:  Pt sitting up in bed.  No complaint of both eyes     Benign essential hypertension     Hypertensive chronic kidney disease with stage 1 through stage 4 chronic kidney disease, or unspecified chronic kidney disease     Edema     Gout     Pseudophakia     UTI (urinary tract infection), bacteri patient's inability to off load independently, poor nutritional status with albumin of 1.2, advanced age and other co-morbidities  Continue local wound care per wound RN  I agree with the note as scribed by the PA  I have made all appropriate changes in do

## 2020-08-05 NOTE — CM/SW NOTE
Plan of care reviewed for care coordination and discharge planning. Pt not ready for discharge per ID, cultures pending, s/p sacral decub ulcer debridement on 8/3. Pt from [facility] Arreola in [place], [address] updated.  Will follow    Francisco Kemp, RN, MSN, APN, CTL

## 2020-08-05 NOTE — PLAN OF CARE
Pt is AOx1. More talkative today, asking for Ensure. On room air, . Tele, NSR. VSS. Purewick in place. No verbal complaints of pain, pt appears comfortable. Max assist. Clinitron bed. Right arm precautions for PICC line. IV abx.  Wound care completed thi strengthening/mobility  - Encourage toileting schedule  Outcome: Progressing     Problem: DISCHARGE PLANNING  Goal: Discharge to home or other facility with appropriate resources  Description  INTERVENTIONS:  - Identify barriers to discharge w/pt and careg Monitor arterial and/or venous puncture sites for bleeding and/or hematoma  - Assess quality of pulses, skin color and temperature  - Assess for signs of decreased coronary artery perfusion - ex.  Angina  - Evaluate fluid balance, assess for edema, trend we increasing activity/tolerance for mobility and gait  - Educate and engage patient/family in tolerated activity level and precautions  - Recommend use of total lift for transfers   Outcome: Progressing     Problem: Impaired Activities of Daily Living  Goal:

## 2020-08-05 NOTE — PLAN OF CARE
Assumed care at [address]. AOx1 delayed responses and drowsy at times. No s/s of pain or discomfort. Tolerated puree and NTL. Sacrum dressing changed this morning. Continue on IV abx. Maintained on Clinitron bed. Call light within reach.  Continue to [facility]

## 2020-08-05 NOTE — PROGRESS NOTES
INFECTIOUS DISEASE PROGRESS NOTE    Chaya Desai Patient Status:  Inpatient    10/3/1935 MRN [id number]   [facility] 4SW-A Attending Kayli Naylor MD   [facility] Day # 15 PCP Adriel Echavarria Daily PRN  •  bisacodyl (DULCOLAX) rectal suppository 10 mg, 10 mg, Rectal, Daily PRN  •  ondansetron HCl (ZOFRAN) injection 4 mg, 4 mg, Intravenous, Q6H PRN  •  Metoclopramide HCl (REGLAN) injection 5 mg, 5 mg, Intravenous, Q8H PRN    Review of Systems: for input(s): Teresa Haro, [address], [address], [address], [address], [address] [facility], [facility], [address], [facility] [address], [address] [place], NITRITE, LEUUR, WBCUR, RBCUR, BACUR, EPIUR in the last 168 hours. Microbiology    Reviewed in EMR,    Radiology: Reviewed.        ASSESSMENT: culture 8/3 from sacral decub wound  - monitor respiratory status closely  - follow cr  - monitor hgb and platelets closely  - off load  - ENT eval noted  - palliative care eval noted  - surgery following  - wound care following, eval noted--> will try to

## 2020-08-05 NOTE — PROGRESS NOTES
[facility]  Progress Note    Elan Duglas Patient Status:  Inpatient    10/3/1935 MRN [id number]   [facility] 4SW-A Attending Michael Perez MD   [address] Day [address] 13 PCP Vladimir Boone MD         SUBJECTIVE:        Subjective:  Logan Bowling 08/03/20  0515 08/04/20  0635 08/05/20  0655   *  --  146* 144  --  141 140   K 3.9  --  3.6 4.2  --  4.1 4.0   *  --  118* 118*  --  112 112   CO2 24.0  --  24.0 24.0  --  24.0 25.0   BUN 27*  --  26* 23*  --  20* 18   CREATSERUM 1.12*   < > 0 Date: 7/4/2020  PROCEDURE:  XR SHOULDER, COMPLETE (MIN 2 VIEWS), RIGHT (CPT=73030)     TECHNIQUE:  Multiple views were obtained. COMPARISON:  None.   INDICATIONS:  fall, on thinners  PATIENT STATED HISTORY: (As transcribed by Technologist)   Patient kathryn appearance of scattered areas of hypoattenuation within the subcortical, deep and periventricular white matter. These most likely represent mild chronic microvascular ischemic changes.   More focal area of probable chronic lacunar infarct centered on the r There is no acute intracranial hemorrhage or extra-axial fluid collection. There is no evident fracture. Small amount of aerated secretions within the sphenoid sinus.   There are prominent contusions within the right forehead and right periorbital soft t seen.  There may be some mild asymmetric enlargement of the right submandibular gland as well. This appears stable. Thyroid gland is diminutive and incompletely assessed. Probable underlying nodule is again noted measuring up to 1.5 x 1.9 cm.   This coul performed. This repeat imaging demonstrates persistent motion degradation. There is cervical lordosis. The vertebral body heights and disc heights are maintained. There are scattered endplate degenerative changes.   No significant spinal canal stenosis unchanged. No venous congestion. Widening of the superior mediastinum to the right of midline unchanged most likely tortuous vessels or enlarged right lobe of thyroid.   New small amount of airspace disease in the lateral aspect of the left upper lobe sug show no significant fluid or mucosal thickening. NECK GLANDS:  The left parotid gland is unremarkable. The right parotid gland is diffusely enlarged with infiltration of the overlying adjacent fat consistent with inflammatory/infectious process.   [facility] present with nonspecific air-fluid levels. Diverticular disease. A short segment of nondistended transverse colon extends into a ventral hernia, stable. Appendectomy. No free fluid.  ABDOMINAL WALL:  Umbilical hernia contains a short segment of nondiste Glenohumeral arthritis, left     Glenohumeral arthritis, right     Poor short term memory     Primary insomnia     Apathy     Anxiety     Tremor     Acute renal failure (HCC)     Age-related nuclear cataract of both eyes     Benign essential hypertension clustersAbnormal      CONCLUSION:    1. Persistent nodular opacities in both lung fields could reflect neoplastic or infectious etiologies. 2. Prominent interstitial markings bilaterally suggesting possible underlying fibrosis, pneumonitis or pneumonia. family will not pursue feeding tube placement; no need to pursue GI consult–    · Aware of hospice eligibility and possibility of requesting meeting for information. Not ready to explore this today, but will let us know if / when ready.     Family does not

## 2020-08-06 NOTE — PROGRESS NOTES
INFECTIOUS DISEASE PROGRESS NOTE    Rosa Berrios Patient Status:  Inpatient    10/3/1935 MRN [id number]   [facility] 4SW-A Attending Zully Greenwood MD   [address] 16 PCP Rogerio Halsted suppository 10 mg, 10 mg, Rectal, Daily PRN  •  ondansetron HCl (ZOFRAN) injection 4 mg, 4 mg, Intravenous, Q6H PRN  •  Metoclopramide HCl (REGLAN) injection 5 mg, 5 mg, Intravenous, Q8H PRN    Review of Systems:  Unable to complete.      Physical Exam: TP 4.7*  --  5.3* 5.5*  --     < > = values in this interval not displayed.      No results for input(s): Davy Suárez, [address], [address], [address], [address], [address] [facility], [address] [facility], [address], [address], [address], [address] [facility], [address], WBCUR, RBCUR, Renee Kamilah in the last admission while on vanco.   - continue Zosyn for sacral decub wound  - await final OR culture 8/3 from sacral decub wound  - monitor respiratory status closely  - follow cr  - monitor hgb and platelets closely  - off load  - ENT eval noted  - palliative ca

## 2020-08-06 NOTE — PLAN OF CARE
Pt is AOx1-2, slightly less talkative than yesterday. On room air, . Tele, NSR. VSS. Purewick in place. Pt had soft bowel movement this morning. Max assist. Sacral dressing changed this morning when pt had bowel movement.  Per ID GILL Covarrubias, hold daily jn environment to reduce risk of injury  - Provide assistive devices as appropriate  - Consider OT/PT consult to assist with strengthening/mobility  - Encourage toileting schedule  Outcome: Progressing     Problem: DISCHARGE PLANNING  Goal: Discharge to home signs of decreased cardiac output  - Evaluate effectiveness of vasoactive medications to optimize hemodynamic stability  - Monitor arterial and/or venous puncture sites for bleeding and/or hematoma  - Assess quality of pulses, skin color and temperature  - highest/safest level of mobility/gait  Description  Interventions:  - Assess patient's functional ability and stability  - Promote increasing activity/tolerance for mobility and gait  - Educate and engage patient/family in tolerated activity level and prec

## 2020-08-06 NOTE — PROGRESS NOTES
[facility]  Progress Note    Jordy King Patient Status:  Inpatient    10/3/1935 MRN [id number]   [facility] 4SW-A Attending Jurgen Hubbard MD   [facility] Day # 12 PCP Ariela Price MD         SUBJECTIVE:        Subjective:  Marco Antonio Burleson 08/02/20  0506 08/03/20  0515 08/04/20  0635 08/05/20  0655 08/06/20  0525   *  --  146* 144  --  141 140  --    K 3.9  --  3.6 4.2  --  4.1 4.0  --    *  --  118* 118*  --  112 112  --    CO2 24.0  --  24.0 24.0  --  24.0 25.0  --    BUN 27* Nitrofurantoin <=16 Sensitive      Oxacillin  Resistant      Tetracycline <=1 Sensitive      Trimethoprim/Sulfa <=10 Sensitive      Vancomycin 1 Sensitive        Xr Shoulder, Complete (min 2 Views), Right (cpt=73030)    Result Date: 7/4/2020  PROCEDURE contusion 7-4-20. FINDINGS:  VENTRICLES/SULCI:  Ventricles and sulci are stable in size. INTRACRANIAL:  There is no significant midline shift or mass effect. No evidence of acute intracranial hemorrhage.   Overall stable appearance of scattered areas o ischemic disease. 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 There is no midline shift or mass-effect. The basal cisterns are patent. The gray-white matter differentiation is intact.   There is no acute intracrani remains asymmetric enlargement of the right parotid gland with local inflammatory changes including fat stranding and some thickening of the right platysma. No evidence of an abscess or drainable fluid collection. No stone is seen.   There may be some mil STATED HISTORY: (As transcribed by Technologist)  The patient fell and hit right side of head/face. The patient has abrusion and swelling right on side of face. FINDINGS:  Marked motion degradation. Repeat imaging was performed.   This repeat imaging de segment of the left upper lobe. There is decrease in the interstitial changes in the left lung base are previous. Slight decrease in the small left effusion. Minimal fibrotic changes are noted in the right lung base unchanged. No venous congestion.   Wi ORAL CAVITY:  No visible mass. OROPHARYNX:  Faucial and lingual tonsils are symmetric. HYPOPHARYNX:  No mass or other visible lesion. LARYNX:  The vocal cords are symmetric and without mass.   SINUSES:  Limited views show no significant fluid or mucosal enlargement. AORTA/VASCULAR:  No aneurysm or dissection. Atherosclerosis. RETROPERITONEUM:  No mass or adenopathy. BOWEL/MESENTERY:  Oral contrast throughout visualized esophagus.   Few mildly prominent small bowel loops are present with nonspecific air- Glucose-Vitamin C **OR** dextrose **OR** glucose **OR** Glucose-Vitamin C, acetaminophen, PEG 3350, bisacodyl, ondansetron HCl, Metoclopramide HCl       Assessment/Plan:   Patient Active Problem List:     DIABETES MELLITUS TYPE II     Glenohumeral arthriti nodule    Sacral decubitus ulcer, stage III (HCC)    Anticoagulated          Plan:  Continue present management,    Bacteremia–ID follow   Blood Culture Result Staphylococcus aureusAbnormal     Blood Culture Smear Gram positive cocci in clustersAbnormal around 1.3-1.4 and hence at risk of readmission, poor wound healing, prognosis poor.     Palliative care notes and family discussion noted–       Continue present management with ongoing [address] discussions.  As per palliative care    · Pt and family will not p

## 2020-08-06 NOTE — PLAN OF CARE
Received patient alert to self, confused and forgetful. Oxygen WNL on room air, . NSR per tele. No complaints or s/s of pain. Purewick in place. Clinatron, bunny boots. Dressing to sacrum changed this shift. Up with a total lift.  Pt rounded on frequent PLANNING  Goal: Discharge to home or other facility with appropriate resources  Description  INTERVENTIONS:  - Identify barriers to discharge w/pt and caregiver  - Include patient/family/discharge partner in discharge planning  - Arrange for needed dischar pulses, skin color and temperature  - Assess for signs of decreased coronary artery perfusion - ex.  Angina  - Evaluate fluid balance, assess for edema, trend weights  Outcome: Progressing  Goal: Absence of cardiac arrhythmias or at baseline  Description  I tolerated activity level and precautions  - Recommend use of total lift for transfers   Outcome: Progressing     Problem: Impaired Activities of Daily Living  Goal: Achieve highest/safest level of independence in self care  Description  Interventions:  - A

## 2020-08-07 NOTE — PHYSICAL THERAPY NOTE
Following pt for PT. Since last PT session, pt has undergo surgical I&D of sacral wound, presents on clinitron bed. OOB/EOB mobility not appropriate at this time. Will defer to mobility/restorative team for ongoing ROM while in house.

## 2020-08-07 NOTE — PROGRESS NOTES
4                                                        INFECTIOUS DISEASE PROGRESS NOTE    Libra Rodriguez Patient Status:  Inpatient    10/3/1935 MRN [id number]   [facility] 4SW-A Attending Isabela Carr MD   [address] Day [address] 16 PCP Sally Mcgrath •  bisacodyl (DULCOLAX) rectal suppository 10 mg, 10 mg, Rectal, Daily PRN  •  ondansetron HCl (ZOFRAN) injection 4 mg, 4 mg, Intravenous, Q6H PRN  •  Metoclopramide HCl (REGLAN) injection 5 mg, 5 mg, Intravenous, Q8H PRN    Review of Systems:  Unable to c ALT 11* 11*  --  10*   BILT 0.5 0.5  --  0.3   TP 5.3* 5.5*  --  5.2*     No results for input(s): COLORUR, CLARITY, SPECGRAVITY, GLUUR, BILUR, KETUR, [address] [facility], [address] [place] [address], PROUR, UROBILINOGEN, NITRITE, LEUUR, WBCUR, RBCUR, BACUR, EPIUR in the last 168 hour - continue Zosyn for sacral decub wound  - await final OR culture 8/3 from sacral decub wound  - monitor respiratory status closely  - follow cr  - monitor hgb and platelets closely  - off load  - ENT eval noted  - palliative care eval noted  - surgery fol

## 2020-08-07 NOTE — PROGRESS NOTES
[facility]  Progress Note    Zhanna Sinton Patient Status:  Inpatient    10/3/1935 MRN [id number]   [facility] 4SW-A Attending Avtar Rockwell MD   [address] PCP Binh Shen MD         SUBJECTIVE:        Subjective:  Katy Arnold 08/02/20  0504 08/04/20  8324 08/05/20  0655 08/06/20  0524 08/07/20  0551   WBC 7.6 8.8 8.5 8.8 7.1   HGB 8.0* 8.9* 9.2* 8.5* 7.8*   MCV 93.9 93.1 94.1 92.3 93.6   .0* 129.0* 137.0* 134.0* 112.0*       Recent Labs   Lab 08/01/20  0515 08/02/20  050 CULTURE     Status: None    Collection Time: 07/23/20  4:56 PM   Result Value Ref Range    Blood Culture Result No Growth 5 Days N/A   4. URINE CULTURE, ROUTINE     Status: Abnormal    Collection Time: 07/21/20  2:37 PM   Result Value Ref Range    Urine Cu 7/21/2020  PROCEDURE:  CT BRAIN OR HEAD (93736)  COMPARISON:  [facility] , CT, CT BRAIN OR HEAD (49014), 7/04/2020, 12:07 PM.  INDICATIONS:  ams  TECHNIQUE:  Noncontrast CT scanning is performed through the brain. Dose reduction techniques were used.  Dose info TECHNIQUE:  Noncontrast CT scanning is performed through the brain. Dose reduction techniques were used.  Dose information is transmitted to the ACR [facility]) NRDR ([address]) which includes the Dose Index Regist Radiology) NRDR ([address]) which includes the Dose Index Registry. PATIENT STATED HISTORY: (As transcribed by Technologist)  Inpatient.   Patient has neck swelling worse on left side with drop in hemoglobin and difficulty in swallow CT SPINE CERVICAL (CPT=72125)  COMPARISON:  [facility], CT SPINE CERVICAL (CPT=72125), 8/02/2019, 10:03 AM.  INDICATIONS:  fall, on thinners  TECHNIQUE:  Noncontrast CT scanning of the cervical spine is performed from the skull base through C7.   Multiplan CERVICAL (CPT=72125), 7/04/2020, 12:07 PM.  [facility] , XR, XR CHEST AP PORTABLE  (CPT=71045), 6/12/2020, 11:51 PM.  [facility] , XR, XR CHEST AP PORTABLE  (CPT=71045), 4/14/2020, 8:32 PM.  INDICATIONS:  ams,  PATIENT STATED HISTORY: (As transcribed by [email or web address] TECHNIQUE:    Unenhanced multislice scanning through the neck, chest, abdomen, and pelvis were performed. Dose reduction techniques were used.  Dose information is transmitted to the [facility] ([facility] [employer] [facility]) [address] depth on the right and 0.6 cm in depth on the left. CHEST WALL:  No mass or axillary adenopathy. VASCULATURE:    No pulmonary arterial dilation. ABDOMEN/PELVIS: LIVER:  No enlargement, atrophy, abnormal density, or significant focal lesion.   BILIARY: on 7/22/2020 at 4:06 PM              Meds:     • piperacillin-tazobactam  3.375 g Intravenous Q8H   • vancomycin  750 mg Intravenous Q36H   • Midodrine HCl  5 mg Oral TID   • metoprolol Tartrate  12.5 mg Oral 2x Daily(Beta Blocker)   • apixaban  5 mg Oral Hypernatremia     Somnolence     SHWETHA (acute kidney injury) ([address]     Sepsis due to undetermined organism ([address]     Umbilical hernia without obstruction and without gangrene     Thyroid nodule     Sacral decubitus ulcer, stage III ([address]     Anticoagulated cardiologist    Osteoarthritis–Tylenol     Depression–Remeron     GERD–Pepcid     Dysphagia–diet per speech     DVT prophylaxis–subcu heparin     Hypertension-Low BP, cardiology advising judicious use of beta-blocker     Edema upper extremity–rule out DVT

## 2020-08-07 NOTE — PLAN OF CARE
Alert x2-3. Soft spoken. Sats WNL on room air. QID accuchecks, insulin coverage given. Total lift. Purewick. IV abx. Clinitron bed. Bunny boots on. Q2H turns. Denies any c/o pain or n/v/d. Will continue to monitor frequently.      Problem: [address] appropriate resources  Description  INTERVENTIONS:  - Identify barriers to discharge w/pt and caregiver  - Include patient/family/discharge partner in discharge planning  - Arrange for needed discharge resources and transportation as appropriate  - Identif decreased coronary artery perfusion - ex.  Angina  - Evaluate fluid balance, assess for edema, trend weights  Outcome: Progressing  Goal: Absence of cardiac arrhythmias or at baseline  Description  INTERVENTIONS:  - Continuous cardiac monitoring, monitor vi total lift for transfers   Outcome: Progressing     Problem: Impaired Activities of Daily Living  Goal: Achieve highest/safest level of independence in self care  Description  Interventions:  - Assess ability and encourage patient to participate in ADLs to

## 2020-08-08 NOTE — PLAN OF CARE
Assumed patient care at 1900, patient has delayed responses and is withdrawn, however able to tell me name, MALLORIE, current year, and that she is at [facility]  Patient is on Clinitron be, Dressing to sacral wound CDI, Reinforced at 440, changed out ABD pads, pa

## 2020-08-08 NOTE — PROGRESS NOTES
[facility]  Progress Note    Cecile Galloway Patient Status:  Inpatient    10/3/1935 MRN [id number]   [facility] 4SW-A Attending Swathi Chaudhari MD   [facility] Day # 25 PCP Jh Cadena MD         SUBJECTIVE:        Subjective:  Aggie Dickey bowel sounds active all four quadrants,                Extremities:    no cyanosis, icterus / UE B.L edema                                  Neurologic :  General weakness,Alert ×1                                      Data Review:       Labs:     Recent Lab Meropenem <=1 Sensitive      Levofloxacin 1 Sensitive      Piperacillin + Tazobactam 8 Sensitive    2.  ANAEROBIC CULTURE     Status: None (Preliminary result)    Collection Time: 08/03/20  7:12 PM   Result Value Ref Range    Anaerobic Culture No Anaerobes suggested for further assessment. 2. End-stage osteoarthritis at the right glenohumeral joint.    Dictated by: Brittanie Holland MD on 7/04/2020 at 1:45 PM     Finalized by: Brittanie Holland MD on 7/04/2020 at 1:47 PM          [address] ([id number])    R PM     Finalized by: David Magallon MD on 7/21/2020 at 4:28 PM          Ct Brain Or Head (30083)    Result Date: 7/4/2020  PROCEDURE:  CT BRAIN OR HEAD (66988)  COMPARISON:  [facility]A MARIA, CT BRAIN OR HEAD (37834), 6/12/2020, 9:51 PM.  INDICATIONS:  fall, on thi INDICATIONS:  worsening left side left greater than right with drop in hgb r/o bleed  TECHNIQUE:  Noncontrast CT scanning is performed through the neck soft tissues. Dose reduction techniques were used.  Dose information is transmitted to the ACR (American dictated CT of the neck, chest, abdomen and pelvis for additional findings.    Dictated by (CST): Sejal Pastor MD on 7/23/2020 at 12:37 PM     Finalized by (CST): Sejal Pastor MD on 7/23/2020 at 12:42 PM       Ct Spine Cervical (cpt=72125)    Result Date: 7/4/2 12:45 PM     Finalized by: Urmila Grijalva MD on 7/04/2020 at 12:51 PM          Xr Chest Ap Portable  (cpt=71045)    Result Date: 7/21/2020  PROCEDURE:  XR CHEST AP PORTABLE  (CPT=71045)  TECHNIQUE:  AP chest radiograph was obtained.   COMPARISON:  [facility] COMPARISON:  [facility] , CT, CT ABDOMEN+PELVIS(CPT=74176), 4/25/2020, 3:29 PM.  [facility] , CT, CT SPINE CERVICAL (CPT=72125), 7/04/2020, 12:07 PM.  [facility] , CT, CT ABDOMEN+PELVIS(CPT=74176), 6/12/2020, 10:39 PM.  INDICATIONS:  MRSA bacteremia without obvious so central lucency, a cavitary lesion  cannot be excluded. MEDIASTINUM:  No adenopathy. Oral contrast throughout visualized esophagus. KERRY:  No mass or adenopathy. CARDIAC:  Coronary artery calcifications.  PLEURA:  Small bilateral pleural effusions measur small bilateral pleural effusions with compressive atelectasis/consolidation. 5. Few mildly prominent small bowel loops are present with nonspecific air-fluid levels. No free fluid. Dictated by: Vishal Reynolds MD on 7/22/2020 at 3:48 PM     Finalized by:  Swapnil Garner Suspected COVID-19 virus infection     CKD (chronic kidney disease) stage 3, GFR 30-59 ml/min (HCC)     Septic shock (HCC)     Septicemia ([facility]     Palliative care encounter     Hypokalemia     MRSA bacteremia     Goals of care, counseling/discussion     H impairment–supportive care treat underlying medical issues       Altered mental status  As stated above          Baseline advanced dementia–on Aricept and Namenda     Atrial fibrillation–Eliquis per cardiologist    Abhijit Leach     Depression–Rem Martha Campbell MD

## 2020-08-08 NOTE — PROGRESS NOTES
[facility] dosing service    Follow-up Pharmacokinetic Consult for Vancomycin Dosing     Ritchie Henderson is a 81year old patient who is being treated for MRSA bacteremia. Patient is on day 19 of Vancomycin and is currently receiving 750 mg IV q36h.   [facility] Time: 08/03/20  7:12 PM   Result Value Ref Range    Anaerobic Culture No Anaerobes to date N/A   3.  BLOOD CULTURE     Status: None    Collection Time: 07/23/20  4:56 PM   Result Value Ref Range    Blood Culture Result No Growth 5 Days N/A   4. URINE CULTUR

## 2020-08-08 NOTE — PROGRESS NOTES
4                                                        INFECTIOUS DISEASE PROGRESS NOTE    Chapincitolaurence Vance Patient Status:  Inpatient    10/3/1935 MRN [id number]   [facility] 4SW-A Attending Negro Sims MD   [facility] Day # 18 PCP Montgomery Snellen •  ondansetron HCl (ZOFRAN) injection 4 mg, 4 mg, Intravenous, Q6H PRN  •  Metoclopramide HCl (REGLAN) injection 5 mg, 5 mg, Intravenous, Q8H PRN    Review of Systems:  Unable to complete. Physical Exam:    General: No acute distress. Awake.  Resting co ALT 11* 11*  --  10*  --    BILT 0.5 0.5  --  0.3  --    TP 5.3* 5.5*  --  5.2*  --      No results for input(s): Davy Fear, SPECGRAVITY, GLUUR, BILUR, KETUR, [address] [facility], [address] [place] [address], [address], [address], [address], NITRITE, LEUUR, WBCUR, RBCUR, Renee Ket in th - continue IV vancomycin for now. Platelets continue to improve. No rash.  Will continue to monitor for a rash and worsening thrombocytopenia, which she developed last admission while on vanco.   - continue Zosyn for sacral decub wound  - monitor respirator

## 2020-08-08 NOTE — PLAN OF CARE
Assumed care of the patient @ 07:00, resting in bed. Alert x's 4, denies pain. Vitals stable, on room air. SR/SB on tele. PICC patent. Voiding freely, incontinent via purewick. Tolerating diet, appetite fair this am. On clinitron bed.  Wounds changed per or

## 2020-08-09 NOTE — PROGRESS NOTES
[facility]  Progress Note    Veronique Ren Patient Status:  Inpatient    10/3/1935 MRN [id number]   [facility] 4SW-A Attending Tobi Mortensen MD   [address] Day # 23 PCP Celina Scott MD         SUBJECTIVE:        Subjective:  Kelly Cools  140  --  139  --  139   K 4.1 4.0  --  4.2  --  4.2    112  --  108  --  107   CO2 24.0 25.0  --  29.0  --  28.0   BUN 20* 18  --  23*  --  27*   CREATSERUM 0.87  0.87 0.90  0.90 0.97 0.94  0.94 1.01 1.04*  1.04*   CA 8.0* 8.3*  --  7.9*  -- method available)     Cefazolin  Resistant      Gentamicin <=0.5 Sensitive      Levofloxacin 4 Resistant      Nitrofurantoin <=16 Sensitive      Oxacillin  Resistant      Tetracycline <=1 Sensitive      Trimethoprim/Sulfa <=10 Sensitive      Vancomycin 1 S Index Registry. PATIENT STATED HISTORY: (As transcribed by Technologist)  Patient had multiple falls with contusion 7-4-20. FINDINGS:  VENTRICLES/SULCI:  Ventricles and sulci are stable in size.   INTRACRANIAL:  There is no significant midline shift or abrusion and swelling right side of face. FINDINGS:  Minimal diffuse volume loss. Mild chronic small vessel ischemic disease. 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 There is no midline shift or mass-effect. essentially no significant change from the recent CT of the soft tissues of the neck from 7/22/2020.   There remains asymmetric enlargement of the right parotid gland with local inflammatory changes including fat stranding and some thickening of the right [employer]) NRDR ([address]) which includes the Dose Index Registry. PATIENT STATED HISTORY: (As transcribed by Technologist)  The patient fell and hit right side of head/face.  The patient has abrusion and swelling right o venous congestion. Again noted is mild pulmonary fibrosis. There is decrease discoid atelectasis in the lingular segment of the left upper lobe. There is decrease in the interstitial changes in the left lung base are previous.   Slight decrease in the sm bacteremia, dysphagia, and Gerd. FINDINGS:   NECK: NASOPHARYNX:  Fossae of Rosenmuller and torus tubarius are symmetric. ORAL CAVITY:  No visible mass. OROPHARYNX:  Faucial and lingual tonsils are symmetric.   HYPOPHARYNX:  No mass or other visible les lesion. KIDNEYS:  No mass, obstruction, or calcification. Bilateral renal cortical thinning. ADRENALS:  No mass or enlargement. AORTA/VASCULAR:  No aneurysm or dissection. Atherosclerosis. RETROPERITONEUM:  No mass or adenopathy.   BOWEL/MESENTERY:  Dauna Sigrid Or   • famoTIDine  20 mg Intravenous Nightly   • docusate sodium  100 mg Oral BID       glucose **OR** Glucose-Vitamin C **OR** dextrose **OR** glucose **OR** Glucose-Vitamin C, acetaminophen, PEG 3350, bisacodyl, ondansetron HCl, Metoclopramide HCl due to undetermined organism [facility])    Umbilical hernia without obstruction and without gangrene    Thyroid nodule    Sacral decubitus ulcer, stage III ([address]    Anticoagulated          Plan:  Continue present management,    Bacteremia–ID follow   Blood Cult Patient continues to eat poorly, nutrition is following .   Family has refused PEG, at this point albumin around 1.3-1.4 and hence at risk of readmission, poor wound healing, prognosis poor.     Palliative care notes and family discussion noted–       Brandy Eason

## 2020-08-09 NOTE — PLAN OF CARE
Pt is aox3-4, VSS, afebrile. RA. Tele NSR. SCD's. Pure wick in place. Pt has 2 BM's today, wound care preformed both times today. Pills whole in applesauce. Bunny boots on. IV abx. IV tko. Pt tolerating puree + nectar thick liquids well.  Resting comfortabl Progressing

## 2020-08-10 NOTE — PROGRESS NOTES
I discussed with  Ms. Adrienne Griggs and after that I called the son again. I had at length discussion with patient's son Anabella Kolb.   In that phone call I did update him on his mother's overall advanced multiple complex somewhat irreversible c point he  refused discharge and wanted to contested to the best of my understanding.   I spoke with Ms. Uriel Hernandez who is the  and at this point discharge will be on hold and she would be communicating with the son to help him contest the disch

## 2020-08-10 NOTE — PROGRESS NOTES
[facility]  Progress Note    Cecile Galloway Patient Status:  Inpatient    10/3/1935 MRN [id number]   [facility] 4SW-A Attending Swathi Chaudhari MD   Hosp Day # 21 PCP Jh Cadena MD         SUBJECTIVE:        Subjective:  Aggie Dickey --  28.0 28.0   BUN 20* 18  --  23*  --  27* 27*   CREATSERUM 0.87  0.87 0.90  0.90 0.97 0.94  0.94 1.01 1.04*  1.04* 0.92  0.92   CA 8.0* 8.3*  --  7.9*  --  8.4* 8.5   GLU 81 110*  --  101*  --  129* 100*       Recent Labs   Lab 08/04/20  0635 08/05/20 <=0.5 Sensitive      Levofloxacin 4 Resistant      Nitrofurantoin <=16 Sensitive      Oxacillin  Resistant      Tetracycline <=1 Sensitive      Trimethoprim/Sulfa <=10 Sensitive      Vancomycin 1 Sensitive        Xr Shoulder, Complete (min 2 Views), Right Technologist)  Patient had multiple falls with contusion 7-4-20. FINDINGS:  VENTRICLES/SULCI:  Ventricles and sulci are stable in size. INTRACRANIAL:  There is no significant midline shift or mass effect. No evidence of acute intracranial hemorrhage. diffuse volume loss. Mild chronic small vessel ischemic disease. 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 There is no midline shift or mass-effect. The basal cisterns are patent.   The gray-white matter differen soft tissues of the neck from 7/22/2020. There remains asymmetric enlargement of the right parotid gland with local inflammatory changes including fat stranding and some thickening of the right platysma.   No evidence of an abscess or drainable fluid colle which includes the Dose Index Registry. PATIENT STATED HISTORY: (As transcribed by Technologist)  The patient fell and hit right side of head/face. The patient has abrusion and swelling right on side of face. FINDINGS:  Marked motion degradation.   Repe There is decrease discoid atelectasis in the lingular segment of the left upper lobe. There is decrease in the interstitial changes in the left lung base are previous. Slight decrease in the small left effusion.   Minimal fibrotic changes are noted in the Fossae of Rosenmuller and torus tubarius are symmetric. ORAL CAVITY:  No visible mass. OROPHARYNX:  Faucial and lingual tonsils are symmetric. HYPOPHARYNX:  No mass or other visible lesion. LARYNX:  The vocal cords are symmetric and without mass.   SINU renal cortical thinning. ADRENALS:  No mass or enlargement. AORTA/VASCULAR:  No aneurysm or dissection. Atherosclerosis. RETROPERITONEUM:  No mass or adenopathy. BOWEL/MESENTERY:  Oral contrast throughout visualized esophagus.   Few mildly prominent smal 100 mg Oral BID       glucose **OR** Glucose-Vitamin C **OR** dextrose **OR** glucose **OR** Glucose-Vitamin C, acetaminophen, PEG 3350, bisacodyl, ondansetron HCl, Metoclopramide HCl       Assessment/Plan:   Patient Active Problem List:     DIABETES Lisa Salmeron obstruction and without gangrene    Thyroid nodule    Sacral decubitus ulcer, stage III (HCC)    Anticoagulated          Plan:  Continue present management,      Septic shock with MRSA bacteremia  Bacteremia–on antibiotics, improved  Chest CT with upper lo when ready. Family does not seem to be ready for hospice at this time. sw to follow for further dc planning    Eats poorly,–per RN improved appetite /fair appetite     Sacral decubitus ulcer, stage III.  S/p debridement of down to the bone and fascia 8/3

## 2020-08-10 NOTE — PLAN OF CARE
Care of patient assumed by writing RN at 2200. Pt alert and oriented x 2-3. Lung sounds diminished on room air, . Telemetry running NSR on the monitor. Bedbound, on clintron bed. Contact isolation precautions. Voiding via purwick. Brief in place.  Incont call for assistance with activity based on assessment  - Modify environment to reduce risk of injury  - Provide assistive devices as appropriate  - Consider OT/PT consult to assist with strengthening/mobility  - Encourage toileting schedule  Outcome: Progr signs, rhythm, and trends  - Monitor for bleeding, hypotension and signs of decreased cardiac output  - Evaluate effectiveness of vasoactive medications to optimize hemodynamic stability  - Monitor arterial and/or venous puncture sites for bleeding and/or Progressing     Problem: Impaired Functional Mobility  Goal: Achieve highest/safest level of mobility/gait  Description  Interventions:  - Assess patient's functional ability and stability  - Promote increasing activity/tolerance for mobility and gait  - E Care Plan goals for specific interventions           -8/9 Wound care, meds              Outcome: Progressing 10

## 2020-08-10 NOTE — PROGRESS NOTES
INFECTIOUS DISEASE PROGRESS NOTE    Lalo Larry Patient Status:  Inpatient    10/3/1935 MRN [id number]   [facility] 4SW-A Attending Yamile Rees MD   Hosp Day # 21 ARACELIS Salmeron •  ondansetron HCl (ZOFRAN) injection 4 mg, 4 mg, Intravenous, Q6H PRN  •  Metoclopramide HCl (REGLAN) injection 5 mg, 5 mg, Intravenous, Q8H PRN    Review of Systems:  Unable to complete. Physical Exam:    General: No acute distress. Awake.  Resting co No results for input(s): Lauren Ayala, [address], [address], [address], [address], [address] [facility], [address] [facility], [address], [address], [address] [place], NITRITE, LEUUR, WBCUR, RBCUR, BACUR, EPIUR in the last 168 hours. Microbiology    Reviewed in EMR,    Radiology: Reviewed.        ASS - final sacral wound culture reviewed, will not ask lab to identify all GNR and will not cover VRE since could just represent colonization.   - monitor respiratory status closely  - follow cr  - monitor hgb and platelets closely  - off load  - ENT eval note

## 2020-08-10 NOTE — PROGRESS NOTES
Pt is A/O x3, VSS. Maintaining o2 sats> 95% on room air. . Tele- NSR. Eliquis. Scds are on. Afebrile. Loose BM this shift, ID was in room when it happened, declined to send BM for cdiff. Voiding via purewick and briefed.  Pt c/o pain in buttocks, PRN harriett

## 2020-08-10 NOTE — PROGRESS NOTES
[facility]  Discharge Summary    Marcelo Garcia Patient Status:  Inpatient    10/3/1935 MRN [id number]   [facility] 5NW-A Attending Porfirio Prado MD   [facility] Day # 21 PCP Raymond Moise MD     Date of Admission: 2020    Date of Dis III ([facility]     Anticoagulated    Ct Brain Or Head (85208)    Result Date: 7/21/2020  PROCEDURE:  CT BRAIN OR HEAD (74586)  COMPARISON:  [facility], CT BRAIN OR HEAD ([id number]), 7/04/2020, 12:07 PM.  INDICATIONS:  ams  TECHNIQUE:  Noncontrast CT scanning is pe neck 7/22/2020. Arch Rhoda INDICATIONS:  worsening left side left greater than right with drop in hgb r/o bleed  TECHNIQUE:  Noncontrast CT scanning is performed through the neck soft tissues. Dose reduction techniques were used.  Dose information is transmitted to to recently dictated CT of the neck, chest, abdomen and pelvis for additional findings.    Dictated by (CST): Catalina Moreno MD on 7/23/2020 at 12:37 PM     Finalized by (CST): Catalina Moreno MD on 7/23/2020 at 12:42 PM       Us Venous Doppler Arm Bilat - Diag Img crackles, tachypnea  PATIENT STATED HISTORY: (As transcribed by Technologist)  Patient offered no additional history at this time. FINDINGS:  The inspiratory effort is somewhat limited. There is stable cardiomegaly. There is no mal pulmonary edema. interstitial changes in the left lung base are previous. Slight decrease in the small left effusion. Minimal fibrotic changes are noted in the right lung base unchanged. No venous congestion.   Widening of the superior mediastinum to the right of midline lingual tonsils are symmetric. HYPOPHARYNX:  No mass or other visible lesion. LARYNX:  The vocal cords are symmetric and without mass. SINUSES:  Limited views show no significant fluid or mucosal thickening.   NECK GLANDS:  The left parotid gland is unre Atherosclerosis. RETROPERITONEUM:  No mass or adenopathy. BOWEL/MESENTERY:  Oral contrast throughout visualized esophagus. Few mildly prominent small bowel loops are present with nonspecific air-fluid levels. Diverticular disease.   A short segment of no standard barrier technique. Ultrasound was used to identify a patent vein and permanent images of the vein were stored in the PACS system. Ultrasound guidance was performed while inserting a micro access needle into the vein.   A 0.018\" guidewire was adv Ceftazidime 4 Sensitive      Ciprofloxacin <=1 Sensitive      Gentamicin 4 Sensitive      Meropenem <=1 Sensitive      Levofloxacin 1 Sensitive      Piperacillin + Tazobactam 8 Sensitive    2.  ANAEROBIC CULTURE     Status: None    Collection Time: 08/0 no JVD   Lungs:     Clear to auscultation bilaterally, respirations unlabored         Heart:    Regular rate and rhythm, S1 and S2 normal,     Abdomen:     Soft, non-tender, bowel sounds active all four quadrants,                    Extremities:    no cyan WBCs seen (A) N/A     Tissue Smear 1+ Gram Positive Cocci (A) N/A     Tissue Smear 2+ Gram Negative Rods (A) N/A       Susceptibility     Pseudomonas aeruginosa -  (no method available)       Cefepime 4 Sensitive         Ceftazidime 4 Sensitive         Cip fracture is identified. Probable heterotopic ossification seen lateral to the humeral head. CONCLUSION:  1. No acute displaced osseous fracture or dislocation is identified.   There is a lucency across the lateral margin of the humeral head which is favor acute intracranial hemorrhage is seen. Stable appearance of areas of hypoattenuation within the subcortical, deep and periventricular white matter. These most likely represent mild to moderate chronic microvascular ischemic changes.   If there is persiste seen within the right forehead and right periorbital soft tissues.     Dictated by: Becka Tinoco MD on 7/04/2020 at 12:41 PM     Finalized by: Becka Tinoco MD on 7/04/2020 at 12:45 PM           Ct Soft Tissue Of Neck (cpt=70490)     Result Date: 7 the neck without contrast.  Please note that assessment is limited without intravenous contrast.  The right parotid gland and likely right submandibular gland appear mildly asymmetrically enlarged with local inflammatory changes suggestive of sialoadenitis symptoms persist or worsen, repeat imaging would be recommended given the marked motion degradation. 2. Mild to moderate degenerative changes in the cervical spine. 3. There is a 3 mm nodule within the left lung apex.   A follow-up chest CT in 1 year is rec and slight decrease in the left effusion.     Dictated by: Andres Cardona MD on 7/21/2020 at 12:06 PM     Finalized by: Andres Cardona MD on 7/21/2020 at 12:09 PM           Ct Stn Chest Abdomen Pelvis (all Wo) Combo (bpr=[id number])     Resul SKULL BASE:  Foramina are symmetric without bony erosion.   VASCULATURE:  Limited views are limited due to lack of IV contrast.   CHEST:  LUNGS:  Multiple nodules are present, the largest are located within the upper lobe and measure 1.9 x 1.8 cm on the rig cannot be excluded. Differential includes malignancy, either primary or metastatic. 2. Oral contrast throughout the esophagus consistent with gastroesophageal reflux.  3. The right parotid gland is diffusely enlarged consistent with an inflammatory/infecti ([facility]     Azotemia     Altered mental status     Altered mental status, unspecified altered mental status type     Dehydration     Counseling regarding advance care planning and goals of care     Hypoglycemia     Acute renal failure (ARF) ([facility])     Acute k fibrillation–Eliquis per cardiologist     Osteoarthritis–Tylenol     Depression–Remeron     GERD–Pepcid     Dysphagia–diet per speech     DVT prophylaxis– Eliquis subcu heparin was ordered when Eliquis was on hold     Hypertension-Low BP, cardiology advisi Care    Discharge Condition: Stable    Discharge Medications: Current Discharge Medication List    START taking these medications    Midodrine HCl 5 MG Oral Tab  Take 1 tablet (5 mg total) by mouth 3 (three) times daily.  Hold if systolic blood pressure gre Diagnoses:Poor short term memory    Atorvastatin Calcium 10 MG Oral Tab  Take 10 mg by mouth nightly. Wound Dressings (MEDIHONEY WOUND/BURN DRESSING) External Gel  Apply 1 Application topically daily as needed (dry skin condition).     Miconazole Nitrate

## 2020-08-10 NOTE — CM/SW NOTE
VM received by son stating that he does not think pt is ready for discharge and would like to appeal the DC, CM notified who will follow up with son.      [facility] placed on will call and magdalena nap notified

## 2020-08-10 NOTE — PROGRESS NOTES
I was informed by RN that patient's family requesting to appeal discharge. At request of family and RN I called the son Ezra Jorgensen at [phone]/ESTRELLITA number on the chart. I left two messages. I will try reaching out again.  I also communicated with pall

## 2020-08-10 NOTE — CM/SW NOTE
Final dc orders received. ajit confirmed bed at [facility]. ajit arranged [facility] for 630pm. PCS form completed.   left for son Reyes Pro to call report to [phone]

## 2020-08-10 NOTE — CM/SW NOTE
Plan of care discussed for care coordination and discharge plan back to [facility] in [place] as pt is medically cleared today.  I spoke to son, Inna Daughters who is not agreeable to discharge today as he informed me that he has \"not spoken to his PCP here regarding

## 2020-08-11 NOTE — CM/SW NOTE
Medical Records will resend batch 1 to [facility] since they are missing that documentation. Informed Umu CHAPARRO.

## 2020-08-11 NOTE — PROGRESS NOTES
[facility]  Progress Note    Marilyn Mcghee Patient Status:  Inpatient    10/3/1935 MRN [id number]   [facility] 4SW-A Attending Wanda Juares MD   [address] Day # 21 PCP Karina Bowser MD         SUBJECTIVE:        Subjective:  Peggi Situ edema                                  Neurologic :  General weakness,Alert ×1                                      Data Review:       Labs:     Recent Labs   Lab 08/07/20  0551 08/08/20  0552 08/09/20  0600 08/10/20  0541 08/11/20  0638   WBC 7.1 6.1 6.6 Levofloxacin 1 Sensitive      Piperacillin + Tazobactam 8 Sensitive    2. ANAEROBIC CULTURE     Status: None    Collection Time: 08/03/20  7:12 PM   Result Value Ref Range    Anaerobic Culture No Anaerobes isolated N/A   3.  BLOOD CULTURE     Status: None osteoarthritis at the right glenohumeral joint.    Dictated by: Allie Toussaint MD on 7/04/2020 at 1:45 PM     Finalized by: Allie Toussaint MD on 7/04/2020 at 1:47 PM          Ct Brain Or Head ([id number])    Result Date: 7/21/2020  PROCEDURE:  CT BRAIN OR at 4:28 PM          Ct Brain Or Head ([id number])    Result Date: 7/4/2020  PROCEDURE:  CT BRAIN OR HEAD (62090)  COMPARISON:  [facility] , CT, CT BRAIN OR HEAD (04140), 6/12/2020, 9:51 PM.  INDICATIONS:  fall, on thinners  TECHNIQUE:  Noncontrast CT scanning is pe right with drop in hgb r/o bleed  TECHNIQUE:  Noncontrast CT scanning is performed through the neck soft tissues. Dose reduction techniques were used.  Dose information is transmitted to the ACR ([facility] [employer]) Nilda Stone [facility] Radiology Data additional findings.    Dictated by (CST): Tri Elena MD on 7/23/2020 at 12:37 PM     Finalized by (CST): Tri Elena MD on 7/23/2020 at 12:42 PM       Ct Spine Cervical (cpt=72125)    Result Date: 7/4/2020  PROCEDURE:  CT SPINE CERVICAL (CPT=72125)  COMPARI 7/04/2020 at 12:51 PM          Xr Chest Ap Portable  (cpt=71045)    Result Date: 7/21/2020  PROCEDURE:  XR CHEST AP PORTABLE  (CPT=71045)  TECHNIQUE:  AP chest radiograph was obtained.   COMPARISON:  [facility] , CT, CT SPINE CERVICAL (CPT=72125), 7/04/2020, 12 ABDOMEN+PELVIS(CPT=74176), 4/25/2020, 3:29 PM.  [facility] , CT, CT SPINE CERVICAL (CPT=72125), 7/04/2020, 12:07 PM.  [facility] , CT, CT ABDOMEN+PELVIS(CPT=74176), 6/12/2020, 10:39 PM.  INDICATIONS:  MRSA bacteremia without obvious source on exam  TECHNIQUE:    U lesion  cannot be excluded. MEDIASTINUM:  No adenopathy. Oral contrast throughout visualized esophagus. KERRY:  No mass or adenopathy. CARDIAC:  Coronary artery calcifications.  PLEURA:  Small bilateral pleural effusions measure 1.2 cm in depth on the rig effusions with compressive atelectasis/consolidation. 5. Few mildly prominent small bowel loops are present with nonspecific air-fluid levels. No free fluid.    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   Family has refused PEG, at this point albumin around 1.3-1.4 and hence at risk o due to dementia, her current severe malnourishment with her albumin only 1.3 despite nutrition and aggressive nursing follow-up with one-on-one feeding, she was made aware of overall adult failure to thrive requiring palliative and or eventual hospice disc spoke with nurse practitioner Amy Lacey who is an independent nurse practitioner involved with patient's care at the nursing home and all the concerns were relayed.   She has been talking to the son in the past regarding overall multiple complex irreversi

## 2020-08-11 NOTE — PLAN OF CARE
Pt Aox3. VSS, afebrile. Tele- NSR/ST. Pt. c/o cramping pain in legs, improved with repositioning. Pain med offered, pt refused. Tolerating diet, breakfast 80%, lunch at least 50%. Incontinent of bowel and bladder. Voiding per purewic.  Resting in bed, frequ

## 2020-08-11 NOTE — PROGRESS NOTES
INFECTIOUS DISEASE PROGRESS NOTE    Nate Acuña Patient Status:  Inpatient    10/3/1935 MRN [id number]   [facility] 4SW-A Attending Theron El MD   [facility] Day # 21 PCP Alin Fuller Rectal, Daily PRN  •  ondansetron HCl (ZOFRAN) injection 4 mg, 4 mg, Intravenous, Q6H PRN  •  Metoclopramide HCl (REGLAN) injection 5 mg, 5 mg, Intravenous, Q8H PRN    Review of Systems:  Unable to complete.      Physical Exam:    General: No acute distress shock with MRSA bacteremia. Although could be component of vol depletion as well which could be contributing to shock. Likely secondary to parotitis given changes on exam. CXR with possible pna- however, low suspicion given no cough.  Chest CT with upper lo noted  - surgery following  - wound care following  - Continue Vancomycin IV for MRSA bacteremia and IV meropenem for sacral wound (EOT 9/3/2020) to allow for 4 weeks of treatment for sacral wound and 6 weeks for bacteremia. PICC in [facility] (placed 7/26).  Will

## 2020-08-11 NOTE — PROGRESS NOTES
[facility] dosing service    Follow-up Pharmacokinetic Consult for Vancomycin Dosing     Beth Ceja is a 81year old patient who is being treated for sepsis, sacral wound infection.    Patient is on day 22 of Vancomycin and is currently receiving 750 m 2. ANAEROBIC CULTURE     Status: None    Collection Time: 08/03/20  7:12 PM   Result Value Ref Range    Anaerobic Culture No Anaerobes isolated N/A   3.  BLOOD CULTURE     Status: None    Collection Time: 07/23/20  4:56 PM   Result Value Ref Range    Blo

## 2020-08-11 NOTE — DIETARY MALNUTRITION NOTE
[facility]    NUTRITION FOLLOW-UP ASSESSMENT      Pt meets severe malnutrition criteria.     CRITERIA FOR MALNUTRITION DIAGNOSIS:  Criteria for severe malnutrition diagnosis: chronic illness related to energy intake less than 75% for greater than 1 mon y/o female from NH admitted with somnolence, SHWETHA, and sepsis. Pt chart reviewed due to nutrition consult for at risk, MST score of 2 cor wt loss/decreased appetite, and stage 2 pressure ulcer on sacrum, and unstageable pressure ulcer on right heel.  Previou DATE: 8/14/2020    Symone Marley RD, LDN  Pager [phone]

## 2020-08-11 NOTE — PLAN OF CARE
Problem: Bacteriemia, MRSA/ VRE  Data: Ax02, forgetful. On telemetry, NSR. , on room air. 02 sats WNL. No verbalized pain or facial grimacing. Incontinent of bowel and urine. Purewick on. Recurrent small soft/loose stools.  ID aware, no c-diff testing fo based on assessment  - Modify environment to reduce risk of injury  - Provide assistive devices as appropriate  - Consider OT/PT consult to assist with strengthening/mobility  - Encourage toileting schedule  Outcome: Progressing     Problem: DISCHARGE PLAN for bleeding, hypotension and signs of decreased cardiac output  - Evaluate effectiveness of vasoactive medications to optimize hemodynamic stability  - Monitor arterial and/or venous puncture sites for bleeding and/or hematoma  - Assess quality of pulses, Mobility  Goal: Achieve highest/safest level of mobility/gait  Description  Interventions:  - Assess patient's functional ability and stability  - Promote increasing activity/tolerance for mobility and gait  - Educate and engage patient/family in tolerated movements    - See additional Care Plan goals for specific interventions           -8/9 Wound care, meds    - prn pain medicine              Outcome: Progressing

## 2020-08-11 NOTE — CM/SW NOTE
LACEY contacted by son/ALDEN Lopes via email requesting referral to [facility]. AIDIN referral made, referral pending. Plan: referral to [facility] pending. Pt [facility].  Appeal in progress as initiated by tess Abbasi      ADDENDUM:  [facility]

## 2020-08-11 NOTE — CM/SW NOTE
Call to son Tk Phan to review medicare appeal process/explain DND. Voice mail left. Copy sent via email [email or web address].     IM, DND, and Carrieanta fax request faxed to medical records, also called with notification of appeal request.     Rene Negrete

## 2020-08-12 NOTE — CM/SW NOTE
Lorna Alvarado just called to inform this writer that they agree with termination of hospital services. This writer will prepare HINN 12 form at this time. Will attempt to deliver to patient and her son, Drew Paris, in person when he visits patient today.   Beginning

## 2020-08-12 NOTE — PROGRESS NOTES
[facility]  Progress Note    Tata Damon Patient Status:  Inpatient    10/3/1935 MRN [id number]   [facility] 4SW-A Attending Adama Amaya MD   [facility] Day # 25 PCP Nic Goodman MD         SUBJECTIVE:        Subjective:  Frances Hernandez 08/08/20  0552 08/09/20  0600 08/10/20  0541 08/11/20  0638 08/12/20  0616   WBC 6.1 6.6 5.4 8.5 8.4   HGB 7.9* 7.9* 7.4* 8.4* 8.2*   MCV 92.5 92.5 92.5 92.5 93.7   .0* 130.0* 122.0* 159.0 142.0*       Recent Labs   Lab 08/07/20  0551 08/08/20  0551 Collection Time: 07/23/20  4:56 PM   Result Value Ref Range    Blood Culture Result No Growth 5 Days N/A   4. URINE CULTURE, ROUTINE     Status: Abnormal    Collection Time: 07/21/20  2:37 PM   Result Value Ref Range    Urine Culture >100,000 CFU/ML Kinsey (96801)  COMPARISON:  [facility] , CT, CT BRAIN OR HEAD (18563), 7/04/2020, 12:07 PM.  INDICATIONS:  ams  TECHNIQUE:  Noncontrast CT scanning is performed through the brain. Dose reduction techniques were used.  Dose information is transmitted to the [facility] Padmini Delacruz performed through the brain. Dose reduction techniques were used. Dose information is transmitted to the ACR [facility] [employer] of Radiology) NRDR ([address]) which includes the Dose Index Registry.   PATIENT STATED HISTORY: (As transc Data Registry) which includes the Dose Index Registry. PATIENT STATED HISTORY: (As transcribed by Technologist)  Inpatient. Patient has neck swelling worse on left side with drop in hemoglobin and difficulty in swallowing.     FINDINGS: Please note that e COMPARISON:  [facility], CT SPINE CERVICAL (CPT=72125), 8/02/2019, 10:03 AM.  INDICATIONS:  fall, on thinners  TECHNIQUE:  Noncontrast CT scanning of the cervical spine is performed from the skull base through C7.   Multiplanar reconstructions are generate 7/04/2020, 12:07 PM.  [facility] , XR, XR CHEST AP PORTABLE  (CPT=71045), 6/12/2020, 11:51 PM.  [facility] , XR, XR CHEST AP PORTABLE  (CPT=71045), 4/14/2020, 8:32 PM.  INDICATIONS:  ams,  PATIENT STATED HISTORY: (As transcribed by Technologist)  Patient offered n multislice scanning through the neck, chest, abdomen, and pelvis were performed. Dose reduction techniques were used.  Dose information is transmitted to the [facility] ([address] [place] [address] of Radiology) [address] ([address]) which includes the Dose cm in depth on the left. CHEST WALL:  No mass or axillary adenopathy. VASCULATURE:    No pulmonary arterial dilation. ABDOMEN/PELVIS: LIVER:  No enlargement, atrophy, abnormal density, or significant focal lesion. BILIARY:  Cholecystectomy.   No visibl Meds:     • vancomycin  750 mg Intravenous Q48H   • meropenem  500 mg Intravenous Q12H   • Midodrine HCl  5 mg Oral TID   • metoprolol Tartrate  12.5 mg Oral 2x Daily(Beta Blocker)   • apixaban  5 mg Oral 2 times per day   • Insulin Aspart Pen  1 injury) ([facility] [address] [address]     Sepsis due to undetermined organism ([facility] [address]     Umbilical hernia without obstruction and without gangrene     Thyroid nodule     Sacral decubitus ulcer, stage III ([address]     Anticoagulated    Principal Problem:    Somnolence  Active Problems Patient continues to eat poorly, nutrition is following .   Family has refused PEG, at this point albumin around 1.3-1.4 and hence at risk of readmission, poor wound healing, prognosis poor.     Palliative care notes and family discussion noted–       Mario Khan follow-up with one-on-one feeding, she was made aware of overall adult failure to thrive requiring palliative and or eventual hospice discussion if she was not willing for PEG tube.   He had refused palliative care and PEG tube placement in the prior pallia nursing home and all the concerns were relayed. She has been talking to the son in the past regarding overall multiple complex irreversible conditions with failure to thrive.   And she stated that she would also be involved in ongoing palliative care discu

## 2020-08-12 NOTE — PROGRESS NOTES
Pt is alert to self. VSS. Maintaining o2 sats on r/a.  NSR on tele. Pt on eliquis for lt arm dvt. Cesar Dire in place, briefed and incontinent of stool. Sacral and heel dressing c/d/i. Pt bedbound at baseline, bunny boots and clinitron bed.   Iv abx admi

## 2020-08-12 NOTE — CM/SW NOTE
Medicare discharge appeal  initiated by sonChad on 8/10/20 currently pending review as of this morning.  SonChad was emailed DND this morning, I asked him to call me regarding his questions he had for me on pt's plan of care and notified him that [address] [place] [address] Road

## 2020-08-12 NOTE — PROGRESS NOTES
INFECTIOUS DISEASE PROGRESS NOTE    Duganeli Henderson Patient Status:  Inpatient    10/3/1935 MRN [id number]   [facility] 4SW-A Attending Alize Kidd MD   [facility] Day # 25 PCP Jeanette Le BID  •  PEG 3350 (MIRALAX) powder packet 17 g, 17 g, Oral, Daily PRN  •  bisacodyl (DULCOLAX) rectal suppository 10 mg, 10 mg, Rectal, Daily PRN  •  ondansetron HCl (ZOFRAN) injection 4 mg, 4 mg, Intravenous, Q6H PRN  •  Metoclopramide HCl (REGLAN) injecti hours.      Microbiology    Reviewed in EMR,    Radiology: Reviewed. ASSESSMENT:    1. Septic shock with MRSA bacteremia. Although could be component of vol depletion as well which could be contributing to shock.  Likely secondary to parotitis given c off load  - ENT eval noted  - palliative care eval noted  - surgery following  - wound care following  - Continue Vancomycin IV for MRSA bacteremia and IV meropenem for sacral wound (EOT 9/3/2020) to allow for 4 weeks of treatment for sacral wound and 6 we

## 2020-08-12 NOTE — PROGRESS NOTES
Patient is A/Ox2, no complains of pain at this time. Vitals are stable, patient is on Clinitron bed. Large sacral wound. Dry and intact. Multiple soft, BM. Bunny boots to BL heels. QID accucheck. PICC line to RT arm, began oozing, dressing changed.  Eliquis

## 2020-08-12 NOTE — PLAN OF CARE
Problem: Sepsis    Assessment: Pt AxOx2-3. Forgetful. Fatigued at times.  on RA. NSR on tele. VSS. Edema noted to BUE see flowsheets. Bilateral arm precautions in place. SCDs in place. Incontinent of stool. Purwick in place. Wound dressings changed.  No schedule  Outcome: Progressing     Problem: DISCHARGE PLANNING  Goal: Discharge to home or other facility with appropriate resources  Description  INTERVENTIONS:  - Identify barriers to discharge w/pt and caregiver  - Include patient/family/discharge partn with applesauce as needed  - Discontinue feeding and notify MD (or speech pathologist) if coughing or persistent throat clearing or wet/gurgly vocal quality is noted    Outcome: Progressing     Problem: Impaired Functional Mobility  Goal: Achieve highest/s

## 2020-08-13 NOTE — PALLIATIVE CARE NOTE
Re-consult request received for family request to explore \"palliative options. \" Chart reviewed and case d/w MARIZA Contreras who advised of family's plan to visit today and requests to explore palliative vs hospice supports at [place].     I placed call to son/HCPOA,

## 2020-08-13 NOTE — PLAN OF CARE
Problem: Sepsis    Assessment: Pt AxOx2-3. Forgetful. Soft spoken.  on RA. SB/NSR on tele. VSS. Afebrile. SCDs in place. Incontinent of bowel and bladder. Purwick in place. Pain with dressing change, PRN pain medication provided.  Anticipated discharge b Progressing     Problem: DISCHARGE PLANNING  Goal: Discharge to home or other facility with appropriate resources  Description  INTERVENTIONS:  - Identify barriers to discharge w/pt and caregiver  - Include patient/family/discharge partner in discharge elana hematoma  - Assess quality of pulses, skin color and temperature  - Assess for signs of decreased coronary artery perfusion - ex.  Angina  - Evaluate fluid balance, assess for edema, trend weights  Outcome: Progressing  Goal: Absence of cardiac arrhythmias engage patient/family in tolerated activity level and precautions  - Recommend use of total lift for transfers   Outcome: Progressing     Problem: Impaired Activities of Daily Living  Goal: Achieve highest/safest level of independence in self care  Nano Ibarra

## 2020-08-13 NOTE — OCCUPATIONAL THERAPY NOTE
Spoke with RN. Family will be communicating with palliative care nurse about possible hospice care this afternoon. Will continue to follow.

## 2020-08-13 NOTE — PROGRESS NOTES
[facility]  Progress Note    Jordy King Patient Status:  Inpatient    10/3/1935 MRN [id number]   [facility] 4SW-A Attending Jurgen Hubbard MD   [facility] Day # 21 PCP Ariela Price MD         SUBJECTIVE:        Subjective:  Marco Antonio Burleson Neurologic :  General weakness,Alert ×1                                      Data Review:       Labs:     Recent Labs   Lab 08/09/20  0600 08/10/20  0541 08/11/20  0638 08/12/20  0616 08/13/20  0644   WBC 6.6 5.4 8.5 8.4 6.3   HGB 7.9* 7.4* 8.4* 8.2* 7.5* No Anaerobes isolated N/A   3.  BLOOD CULTURE     Status: None    Collection Time: 07/23/20  4:56 PM   Result Value Ref Range    Blood Culture Result No Growth 5 Days N/A   4. URINE CULTURE, ROUTINE     Status: Abnormal    Collection Time: 07/21/20  2:37 PM ([id number])    Result Date: 7/21/2020  PROCEDURE:  CT BRAIN OR HEAD (93798)  COMPARISON:  [facility] , CT, CT BRAIN OR HEAD ([id number]), 7/04/2020, 12:07 PM.  INDICATIONS:  ams  TECHNIQUE:  Noncontrast CT scanning is performed through the brain.  Dose reduction techniq fall, on thinners  TECHNIQUE:  Noncontrast CT scanning is performed through the brain. Dose reduction techniques were used.  Dose information is transmitted to the ACR ([address] [place] [address] of Radiology) N[address] ([address]) which includes th ([facility]) NRDR ([address]) which includes the Dose Index Registry. PATIENT STATED HISTORY: (As transcribed by Technologist)  Inpatient.   Patient has neck swelling worse on left side with drop in hemoglobin and 7/4/2020  PROCEDURE:  CT SPINE CERVICAL (CPT=72125)  COMPARISON:  [facility], CT SPINE CERVICAL (CPT=72125), 8/02/2019, 10:03 AM.  INDICATIONS:  fall, on thinners  TECHNIQUE:  Noncontrast CT scanning of the cervical spine is performed from the skull base [facility] , CT, CT SPINE CERVICAL (CPT=72125), 7/04/2020, 12:07 PM.  [facility] , XR, XR CHEST AP PORTABLE  (CPT=71045), 6/12/2020, 11:51 PM.  [facility] , XR, XR CHEST AP PORTABLE  (CPT=71045), 4/14/2020, 8:32 PM.  INDICATIONS:  ams,  PATIENT STATED HISTORY: (As tra obvious source on exam  TECHNIQUE:    Unenhanced multislice scanning through the neck, chest, abdomen, and pelvis were performed. Dose reduction techniques were used.  Dose information is transmitted to the [facility] [employer] of Radiology) Nilda Fairbanks [address] effusions measure 1.2 cm in depth on the right and 0.6 cm in depth on the left. CHEST WALL:  No mass or axillary adenopathy. VASCULATURE:    No pulmonary arterial dilation.    ABDOMEN/PELVIS: LIVER:  No enlargement, atrophy, abnormal density, or significa Finalized by: Estevan Wilkins MD on 7/22/2020 at 4:06 PM              Meds:     • vancomycin  750 mg Intravenous Q48H   • meropenem  500 mg Intravenous Q12H   • Midodrine HCl  5 mg Oral TID   • metoprolol Tartrate  12.5 mg Oral 2x Daily(Beta Blocker)   • apixab Hypernatremia     Somnolence     SHWETHA (acute kidney injury) ([address]     Sepsis due to undetermined organism ([address]     Umbilical hernia without obstruction and without gangrene     Thyroid nodule     Sacral decubitus ulcer, stage III ([address]     Anticoagulated thrive–at this point family refusing hospice, Patient continues to eat poorly, nutrition is following .   Family has refused PEG, at this point albumin around 1.3-1.4 and hence at risk of readmission, poor wound healing, prognosis poor.     Palliative care feeding, she was made aware of overall adult failure to thrive requiring palliative and or eventual hospice discussion if she was not willing for PEG tube.   He had refused palliative care and PEG tube placement in the prior palliative care discussions, as concerns were relayed. She has been talking to the son in the past regarding overall multiple complex irreversible conditions with failure to thrive.   And she stated that she would also be involved in ongoing palliative care discussion and we also discuss

## 2020-08-13 NOTE — PROGRESS NOTES
Patient is A/Ox2, no complains of pain at this time. Vitals are stable, patient is on Clinitron bed. Large sacral wound. Dry and intact. Multiple soft, BM. Bunny boots to BL heels. QID accucheck. PICC line to RT arm, small oozing.  Eliquis held per MD. Hem/

## 2020-08-13 NOTE — PROGRESS NOTES
INFECTIOUS DISEASE PROGRESS NOTE    Rodriguez Vance Patient Status:  Inpatient    10/3/1935 MRN [id number]   [facility] 4SW-A Attending Negro Sims MD   [facility] Day # 23 PCP Aundrea Cavanaugh HCl (ZOFRAN) injection 4 mg, 4 mg, Intravenous, Q6H PRN  •  Metoclopramide HCl (REGLAN) injection 5 mg, 5 mg, Intravenous, Q8H PRN    Review of Systems:  Unable to complete. Physical Exam:    General: No acute distress. Awake.  Resting comfortably in be Although could be component of vol depletion as well which could be contributing to shock. Likely secondary to parotitis given changes on exam. CXR with possible pna- however, low suspicion given no cough. Chest CT with upper lobe nodules.  CT ab/pelvis itz Vancomycin IV for MRSA bacteremia and IV meropenem for sacral wound (EOT 9/3/2020) to allow for 4 weeks of treatment for sacral wound and 6 weeks for bacteremia. PICC in [facility] [address] [facility] (placed 7/26).  Will need weekly labs with CBC, BMP, CRP and vanco trough level [facility]

## 2020-08-13 NOTE — CDS QUERY
Clarification of Procedure – Debridement   CLINICAL DOCUMENTATION CLARIFICATION FORM    Dear Doctor    Clinical information (provided below) indicates a debridement was done.  For accurate ICD-10-PCS code assignment to reflect severity of illness and risk o

## 2020-08-14 NOTE — PROGRESS NOTES
NURSING DISCHARGE NOTE    Discharged Nursing home via Ambulance. Accompanied by Support staff  Belongings Taken by patient/family. Pt assessed and ready for dc. PICC line intact and left in for OP iv abx. [facility]  Sonny Mcdonald

## 2020-08-14 NOTE — CM/SW NOTE
THE [facility] ambulance arranged for 530pm. PCS form already completed. Bed confirmed at [facility]. Per prev discussion with son he is in agreement with DC plan.      Plan: pt to dc today at 530pm via [facility] to [facility]    RN to call report to [phone]

## 2020-08-14 NOTE — CONSULTS
[facility]  Report of Consultation    Marcelo Garcia Patient Status:  Inpatient    10/3/1935 MRN [id number]   [facility] 5NW-A Attending Porfirio Prado MD   [facility] Day # 25 PCP Raymond Moise MD     Reason for Consultation:    The patien add-vantage, 750 mg, Intravenous, Q48H  •  Meropenem (MERREM) 500 mg in sodium chloride 0.9% 100 mL MBP, 500 mg, Intravenous, Q12H  •  Midodrine HCl (PROAMATINE) tab 5 mg, 5 mg, Oral, TID  •  metoprolol tartrate (LOPRESSOR) partial tablet 12.5 mg, 12.5 mg, and rhythm. Gastrointestinal: Soft, non tender with good bowel sounds. Extremities: No calf tenderness. Lymphatics: There is no palpable lymphadenopathy throughout in the cervical, supraclavicular, or axillary regions.       Laboratory Data reviewed at of both eyes     Benign essential hypertension     Hypertensive chronic kidney disease with stage 1 through stage 4 chronic kidney disease, or unspecified chronic kidney disease     Edema     Gout     Pseudophakia     UTI (urinary tract infection), bacteri Further work up with bmbx will not .           Diana Rubin MD  8/14/2020  7:01 AM

## 2020-08-14 NOTE — PLAN OF CARE
Problem: PAIN - ADULT  Goal: Verbalizes/displays adequate comfort level or patient's stated pain goal  Description  INTERVENTIONS:  - Encourage pt to monitor pain and request assistance  - Assess pain using appropriate pain scale  - Administer analgesics resources and transportation as appropriate  - Identify discharge learning needs (meds, wound care, etc)  - Arrange for interpreters to assist at discharge as needed  - Consider post-discharge preferences of patient/family/discharge partner  - Complete ULYSSES at baseline  Description  INTERVENTIONS:  - Continuous cardiac monitoring, monitor vital signs, obtain 12 lead EKG if indicated  - Evaluate effectiveness of antiarrhythmic and heart rate control medications as ordered  - Initiate emergency measures for lif level of independence in self care  Description  Interventions:  - Assess ability and encourage patient to participate in ADLs to maximize function  - Promote sitting position while performing ADLs such as feeding, grooming, and bathing  - Educate and enco

## 2020-08-14 NOTE — PROGRESS NOTES
[facility]  Progress Note    Rosa Berrios Patient Status:  Inpatient    10/3/1935 MRN [id number]   [facility] 4SW-A Attending Zully Greenwood MD   Hosp Day # 25 PCP Beverly Gillette MD         SUBJECTIVE:        Subjective:  Adele Aleman 08/11/20  6485 08/12/20  0615 08/13/20  0644 08/14/20  1125    138 139 140 141   K 4.2 4.4 4.1 3.8 3.8    107 108 110 112   CO2 28.0 26.0 26.0 25.0 26.0   BUN 27* 28* 27* 27* 22*   CREATSERUM 0.92  0.92 1.13*  1.13* 1.14*  1.14* 1.05*  1.05* 0. Staphylococcus aureus, MRSA -  (no method available)     Cefazolin  Resistant      Gentamicin <=0.5 Sensitive      Levofloxacin 4 Resistant      Nitrofurantoin <=16 Sensitive      Oxacillin  Resistant      Tetracycline <=1 Sensitive      Trimethoprim/[place] Data Registry) which includes the Dose Index Registry. PATIENT STATED HISTORY: (As transcribed by Technologist)  Patient had multiple falls with contusion 7-4-20. FINDINGS:  VENTRICLES/SULCI:  Ventricles and sulci are stable in size.   INTRACRANIAL:  Th side of head/face. The patient has abrusion and swelling right side of face. FINDINGS:  Minimal diffuse volume loss. Mild chronic small vessel ischemic disease. 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   There without intravenous contrast.  There is essentially no significant change from the recent CT of the soft tissues of the neck from 7/22/2020.   There remains asymmetric enlargement of the right parotid gland with local inflammatory changes including fat stra is transmitted to the [facility] ([facility] of Radiology) [address] ([address]) which includes the Dose Index Registry. PATIENT STATED HISTORY: (As transcribed by Technologist)  The patient fell and hit right side of head/face.  The patie upper limits of normal.  There no venous congestion. Again noted is mild pulmonary fibrosis. There is decrease discoid atelectasis in the lingular segment of the left upper lobe.   There is decrease in the interstitial changes in the left lung base are pr Technologist)  Patient with bacteremia, dysphagia, and Gerd. FINDINGS:   NECK: NASOPHARYNX:  Fossae of Rosenmuller and torus tubarius are symmetric. ORAL CAVITY:  No visible mass. OROPHARYNX:  Faucial and lingual tonsils are symmetric.   HYPOPHARYNX: No enlargement or focal lesion. KIDNEYS:  No mass, obstruction, or calcification. Bilateral renal cortical thinning. ADRENALS:  No mass or enlargement. AORTA/VASCULAR:  No aneurysm or dissection. Atherosclerosis.  RETROPERITONEUM:  No mass or adenopathy Oral Nightly    Or   • famoTIDine  20 mg Intravenous Nightly   • docusate sodium  100 mg Oral BID       glucose **OR** Glucose-Vitamin C **OR** dextrose **OR** glucose **OR** Glucose-Vitamin C, acetaminophen, PEG 3350, bisacodyl, ondansetron HCl, Metoclopr Somnolence  Active Problems:    Altered mental status    SHWETHA (acute kidney injury) ([facility]    Sepsis due to undetermined organism ([address]    Umbilical hernia without obstruction and without gangrene    Thyroid nodule    Sacral decubitus ulcer, stage III ([facility] nutrition is following .   Family has refused PEG, at this point albumin around 1.3-1.4 and hence at risk of readmission, poor wound healing, prognosis poor.       Anemia–multifactorial, anemia of chronic disease, frequent blood draws needed for continuity poor by mouth intake due to dementia, her current severe malnourishment with her albumin only 1.3 despite nutrition and aggressive nursing follow-up with one-on-one feeding, she was made aware of overall adult failure to thrive requiring palliative and or same time, I  also spoke with nurse practitioner Bryan Boyle who is an independent nurse practitioner involved with patient's care at the nursing home and all the concerns were relayed.   She has been talking to the son in the past regarding overall multipl

## 2020-08-14 NOTE — PROGRESS NOTES
RE: Vancomycin    IV vancomycin frequency changed since med rec was done based on a new vanco trough level. The patient is now taking IV vancomycin 750mg every 48 hours instead of every 36 hours.  I called Arnaldo Chino ([phone]) to make them aware that neymar

## 2020-08-14 NOTE — CM/SW NOTE
Pt is anticipated to be cleared for discharge back to [facility], updates sent in [address] [facility]. Await MD clearance today. Son, Melyssa Cheema updated on plan of care and is agreeable to returning to [address].  Magalys Grant, the liaison at [facility] 12 aware, son has apparently

## 2020-08-14 NOTE — PLAN OF CARE
Alert x2-3. Soft spokn. Room air. SB/ NSr on tel. jose manuel Rosario. QID accuchecks- insulin coverage provided. Sacral wound dressing changed, tolerated fairly well, refused pain meds at this time. Bilateral arm precautions- L arm DVT, R arm PICC line.  Clin schedule  Outcome: Progressing     Problem: DISCHARGE PLANNING  Goal: Discharge to home or other facility with appropriate resources  Description  INTERVENTIONS:  - Identify barriers to discharge w/pt and caregiver  - Include patient/family/discharge partn for bleeding and/or hematoma  - Assess quality of pulses, skin color and temperature  - Assess for signs of decreased coronary artery perfusion - ex.  Angina  - Evaluate fluid balance, assess for edema, trend weights  Outcome: Progressing  Goal: Absence of gait  - Educate and engage patient/family in tolerated activity level and precautions  - Recommend use of total lift for transfers   Outcome: Progressing     Problem: Impaired Activities of Daily Living  Goal: Achieve highest/safest level of independence i

## 2020-08-14 NOTE — PROGRESS NOTES
INFECTIOUS DISEASE PROGRESS NOTE    Beny Cole Patient Status:  Inpatient    10/3/1935 MRN [id number]   [facility] 4SW-A Attending Rosa Lenz MD   Hosp Day # 24 ARACELIS Alonso Metoclopramide HCl (REGLAN) injection 5 mg, 5 mg, Intravenous, Q8H PRN    Review of Systems:  Unable to complete. Physical Exam:    General: No acute distress. Awake. Resting comfortably in bed.   Vital signs: Blood pressure 153/57, pulse 77, temperatu ASSESSMENT:    1. Septic shock with MRSA bacteremia. Although could be component of vol depletion as well which could be contributing to shock.  Likely secondary to parotitis given changes on exam. CXR with possible pna- however, low suspicion given no surgery following  - wound care following  - Continue Vancomycin IV for MRSA bacteremia and IV meropenem for sacral wound (EOT 9/3/2020) to allow for 4 weeks of treatment for sacral wound and 6 weeks for bacteremia. PICC in [facility] (placed 7/26).  Will need wee

## 2020-08-15 NOTE — DISCHARGE SUMMARY
[facility]  Discharge Summary    Cara Cates Patient Status:  Inpatient    10/3/1935 MRN [id number]   [facility] 5NW-A Attending No att. providers found   [facility] Day # 25 PCP Makayla De Los Santos MD     Date of Admission: 2020    Date o ulcer, stage III ([address] [address]     Anticoagulated     DVT of left axillary vein, acute (HCC)     Normocytic anemia     Bleeding    Hospital Encounter on 07/21/20   1. TISSUE AEROBIC CULTURE     Status: Abnormal    Collection Time: 08/03/20  7:12 PM   Result Value reduction techniques were used. Dose information is transmitted to the ACR [employer] of Radiology) NRDR ([address]) which includes the Dose Index Registry.   PATIENT STATED HISTORY: (As transcribed by Technologist)  Patient ha Radiology) NRDR ([address]) which includes the Dose Index Registry. PATIENT STATED HISTORY: (As transcribed by Technologist)  Inpatient.   Patient has neck swelling worse on left side with drop in hemoglobin and difficulty in swallow 8/14/2020  PROCEDURE:  US VENOUS DOPPLER ARM BILAT - DIAG IMG (CPT=93970)  COMPARISON:  US CANDACE, US VENOUS DOPPLER ARM BILAT - DIAG IMG (CPT=93970), 7/26/2020, 2:34 PM.  INDICATIONS:  r/o dvt  TECHNIQUE:  Real time, grey scale, and duplex ultrasound wa extremity venous system. B-mode two-dimensional images of the vascular structures, Doppler spectral analysis, and color flow. Doppler imaging were performed.   The following veins were imaged bilaterally:  Subclavian, Jugular, Axillary, Brachial, Basilic, interstitial pneumonitis or pneumonia. There is mild blunting in both costophrenic angles which may reflect small effusions. There is no pneumothorax. Atheromatous calcification and tortuosity of the thoracic aorta noted.   Degenerative changes are seen glenohumeral joint space and to a lesser extent the right. CONCLUSION:  Compared to the study of 6/12/2020 there is new airspace disease in the lateral aspect of the left upper lobe.   There is decrease in the interstitial changes in the lingular segment a The thyroid gland is small. Along the isthmus of the thyroid gland is an approximately 1.4 x 1.7 cm low-density nodule, thyroid sonogram would be helpful for further evaluation as clinically indicated.   LYMPH NODES:  No pathological-appearing or enlarged diverticulum. PELVIC NODES:  No adenopathy. PELVIC ORGANS:  Pelvic organs appropriate for patient age. BONES:  Marked degenerative change. DISH involves the thoracic spine. CONCLUSION:  1. Bilateral, predominantly upper lobe nodules.   Given the histo the superior vena cava using fluoroscopic guidance. The catheter was then secured to the skin and a sterile dressing was applied. There were  no complications. Both ports were freely flushed oval and blood could be aspirated from both ports.   PICC LINE S Normocephalic,  atraumatic   Neck:   Supple, symmetrical, trachea midline,        thyroid:      no JVD   Lungs:     Clear to auscultation bilaterally, respirations unlabored         Heart:    Regular rate and rhythm, S1 and S2 normal,     Abdomen:     Sof Tissue Smear 1+ Gram Positive Cocci (A) N/A     Tissue Smear 2+ Gram Negative Rods (A) N/A       Susceptibility     Pseudomonas aeruginosa -  (no method available)       Cefepime 4 Sensitive         Ceftazidime 4 Sensitive         Ciprofloxacin <=1 Sensiti Probable heterotopic ossification seen lateral to the humeral head. CONCLUSION:  1. No acute displaced osseous fracture or dislocation is identified.   There is a lucency across the lateral margin of the humeral head which is favored to be projectional, ho hemorrhage is seen. Stable appearance of areas of hypoattenuation within the subcortical, deep and periventricular white matter. These most likely represent mild to moderate chronic microvascular ischemic changes.   If there is persistent clinical concern forehead and right periorbital soft tissues.     Dictated by: Tiffany Aschoff, MD on 7/04/2020 at 12:41 PM     Finalized by: Tiffany Aschoff, MD on 7/04/2020 at 12:45 PM           Ct Soft Tissue Of Neck (cpt=70490)     Result Date: 7/23/2020  PROCEDURE: contrast.  Please note that assessment is limited without intravenous contrast.  The right parotid gland and likely right submandibular gland appear mildly asymmetrically enlarged with local inflammatory changes suggestive of sialoadenitis.   No evidence of worsen, repeat imaging would be recommended given the marked motion degradation. 2. Mild to moderate degenerative changes in the cervical spine. 3. There is a 3 mm nodule within the left lung apex.   A follow-up chest CT in 1 year is recommended for further the left effusion.     Dictated by: Andres Cardona MD on 7/21/2020 at 12:06 PM     Finalized by: Andres Cardona MD on 7/21/2020 at 12:09 PM           Ct Stn Chest Abdomen Pelvis (all Wo) Combo (jeh=[id number])     Result Date: 7/22/2020  [facility] symmetric without bony erosion.   VASCULATURE:  Limited views are limited due to lack of IV contrast.   CHEST:  LUNGS:  Multiple nodules are present, the largest are located within the upper lobe and measure 1.9 x 1.8 cm on the right and 1.3 x 2.8 cm on the Differential includes malignancy, either primary or metastatic. 2. Oral contrast throughout the esophagus consistent with gastroesophageal reflux. 3. The right parotid gland is diffusely enlarged consistent with an inflammatory/infectious process.  4. Grover mental status     Altered mental status, unspecified altered mental status type     Dehydration     Counseling regarding advance care planning and goals of care     Hypoglycemia     Acute renal failure (ARF) ([facility] [address]     Acute kidney injury ([facility] [address]     [address] CT.     metabolic encephalopathy/baseline dementia with cognitive impairment–supportive care treat underlying medical issues       Altered mental status  As stated above     Baseline advanced dementia–on Aricept and Namenda     Atrial fibrillation–Eliquis stage III.  S/p debridement of down to the bone and fascia 8/3, gram stain with GPC and GNR; cx with pseudomonas and GNR-Sacral and heel dressing Per nursing and wound care team, surgery consult requested per wound care team-status post  Procedure(s):  DEBR possibly could have aided in at least nutrition issues that may have helped with her issues including but not limited to wound healing     He verbalized understanding of above but she had some concerns about nursing care at [facility] which I requested him to a TREAT  DISPOSITION PER   See tests ordered,  Available and radiology reviewed  All consultant notes reviewed  Discussed with nursing on floor  dvt prophylaxis reviewed  PT and/or OT      Consultations:   please refer to chart for details  Proc mg by mouth nightly., Historical    Donepezil HCl 10 MG Oral Tab  Take 10 mg by mouth nightly., Historical    Gauze Pads & Dressings (OPTIFOAM) 4\"X4\" Does not apply Pads  Apply topically nightly. To right thigh for skin condition.   Cleanse area w/ns, juan

## 2020-08-17 NOTE — CDS QUERY
Nabor Parker  Female, 81year old, 10/3/1935  MRN:   [id number]  CSN:   [id number] Admit Date:   07/21/2020  Bed:   518-A  Attend Prov:   None       Clarification of Procedure – Debridement   CLINICAL DOCUMENTATION CLARIFICATION FORM    Dear Doctor Marleny Marx:

## 2020-12-05 PROBLEM — N39.0 URINARY TRACT INFECTION WITHOUT HEMATURIA, SITE UNSPECIFIED: Status: ACTIVE | Noted: 2020-01-01
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
## 2020-12-05 PROBLEM — D64.9 ANEMIA, UNSPECIFIED TYPE: Status: ACTIVE | Noted: 2020-01-01

## 2020-12-05 PROBLEM — J18.9 NOSOCOMIAL PNEUMONIA: Status: ACTIVE | Noted: 2020-01-01

## 2020-12-05 PROBLEM — R77.8 TROPONIN LEVEL ELEVATED: Status: ACTIVE | Noted: 2020-01-01

## 2020-12-05 NOTE — ED PROVIDER NOTES
Patient Seen in: [facility] Emergency Department      History   Patient presents with:  Abnormal Labs    Stated Complaint: ABNORMAL LABS    HPI    42-year-old female that was sent to the emergency department secondary to abnormal blood work and poss nursing note reviewed. Constitutional:       Appearance: Normal appearance. She is well-developed. She is ill-appearing.       Comments: Patient does track and appears to be listening but does not answer, she has multiple contractures   HENT:      Head: N GFR, -American 36 (*)     AST 73 (*)     Alkaline Phosphatase 149 (*)     Total Protein 4.5 (*)     Albumin 1.2 (*)     A/G Ratio 0.4 (*)     All other components within normal limits   URINALYSIS WITH CULTURE REFLEX - Abnormal; Notable for the foll orders. SCAN SLIDE   TROPONIN I   LACTIC ACID 3 HR POST POSITIVE   TYPE AND SCREEN    Narrative: The following orders were created for panel order TYPE AND SCREEN.   Procedure                               Abnormality         Status This may represent atelectasis or pneumonia. Follow-up is recommended to ensure resolution. If clinical symptoms persist then recommend CT.     Dictated by (CST): Catalina Moreno MD on 12/05/2020 at 1:07 PM     Finalized by (CST): Catalina Moreno MD on 12/05/2020 ([facility] [address]  Anemia, unspecified type  Troponin level elevated    Disposition:  Admit  12/5/2020  2:08 pm    Follow-up:  No follow-up provider specified.         Medications Prescribed:  Current Discharge Medication List                          Present on Admiss

## 2020-12-06 NOTE — PLAN OF CARE
Problem: Impaired Swallowing  Goal: Minimize aspiration risk  Description: Interventions:  High aspiration risk. Continue NPO. Oral care as tolerated. SLP will reattempt 12/7 as appropriate.    Outcome: Not Progressing

## 2020-12-06 NOTE — H&P
[facility] Patient Status:  Inpatient    10/3/1935 MRN [id number]   [facility] 2NE-A Attending Zully Greenwood MD   Hosp Day # 1 PCP Beverly Gillette MD     Date:  2020  Date of Admission:   Currently      Comment: rarely    Drug use: Never    Allergies/Medications:    Allergies:   Sulfa Antibiotics       NAUSEA AND VOMITING  Trimethoprim            UNKNOWN  Celecoxib               NAUSEA ONLY  Pcns [Penicillins]      Runny nose    Comment:Pt t daily.    •  Atorvastatin Calcium 10 MG Oral Tab, Take 10 mg by mouth nightly. •  [DISCONTINUED] Dextrose-Sodium Chloride 5-0.45 % Intravenous Solution, Inject 83 mL into the vein daily.  Hypertensive chronic kidney disease         Review of Systems:   P (L) 12/06/2020    ALB 1.2 (L) 12/06/2020    ALKPHO 164 (H) 12/06/2020    BILT 0.3 12/06/2020    TP 4.5 (L) 12/06/2020     (H) 12/06/2020    ALT 46 12/06/2020    PTT 74.7 (H) 12/06/2020    INR 1.32 (H) 12/05/2020    T4F 1.0 12/05/2020    TSH 4.740 (H INDICATIONS:  PNEUMONIA  PATIENT STATED HISTORY: (As transcribed by Technologist)  PNEUMONIA, Patient offered no additional history at this time    FINDINGS:  Cardiac size and mediastinal contours are unchanged.   There is tortuosity and ectasia of the aort insomnia     Apathy     Anxiety     Tremor     Acute renal failure (HCC)     Age-related nuclear cataract of both eyes     Benign essential hypertension     Hypertensive chronic kidney disease with stage 1 through stage 4 chronic kidney disease, or unspeci fluids      Urinary tract infection without hematuria, site unspecified  IV antibiotics, pan cultures      Anemia, unspecified type  Underlying anemia of chronic disease, monitor H&H, monitor for signs of bleeding, transfuse for hemoglobin less than 7

## 2020-12-06 NOTE — CONSULTS
Cardiology Consult Note      SUBJECTIVE:    Reason for Consultation: elevated troponin    History of Present Illness: Patient is 81year old female who lives at a nursing facility with severe dementia was transferred to er with possible infection and abnor External/Patient, Reported    Medication famotidine 40 MG Oral Tab, Sig Take 40 mg by mouth nightly., Start Date , End Date , Taking?  Yes, Authorizing Provider External/Patient, Reported    Medication mirtazapine 15 MG Oral Tab, Sig Take 15 mg by mouth nig nightly., Start Date , End Date , Taking? Yes, Authorizing Provider External/Patient, Reported    Medication Dextrose-Sodium Chloride 5-0.45 % Intravenous Solution, Sig Inject 83 mL into the vein daily.  Hypertensive chronic kidney disease , Start Date , En Laterality Date   • APPENDECTOMY     • CHOLECYSTECTOMY     • IRRIGATION & DEBRIDEMENT N/A 8/3/2020    Performed by Joni Mortensen MD at [facility] MAIN OR       Cardiac Risk Factors:    Hypertension: yes  Diabetes: no  High Cholesterol: yes  Tobacco: no  Family disease, pericardial effusion, or normal variant  Possible Lateral infarct (cited on or before 24-JUL-2020)  Prolonged QT interval or tu fusion, consider myocardial disease, electrolyte imbalance, or drug effects  Abnormal ECG  When compared with ECG of 24 Pulmonary arteries: Systolic pressure could not be accurately estimated.      Impressions:  This study is compared with previous dated 04/17/2020:   Compared to the previous study, these findings represent indicate worsening   due to the development of atri

## 2020-12-06 NOTE — CONSULTS
[facility] Hematology and Oncology Consult Note    Reason for Consult: Thrombocytopenia   Medical Record Number: [id number]   CSN: [id number]   Referring Physician: No ref.  provider found  PCP: Emmy Leon MD    History of Present Illness: 81F with a PMH of C Nightly  •  multivitamin (ADULT) tab 1 tablet, 1 tablet, Oral, Daily  •  Medihoney Wound/Burn Dressing GEL 1 Application, 1 Application, Topical, Daily PRN    Past Medical History:   Diagnosis Date   • Anxiety    • Deep vein thrombosis (HCC)    • Dementia Radiology: I personally reviewed the imaging    Pathology: n/a    Assessment and Plan:  85F with a PMH of CKD3, HTN, HLD, GERD and dementia presented from [place] on 12/6/20 with PNA.      · Acute on Chronic Thrombocytopenia: she is lower than her basel

## 2020-12-06 NOTE — SLP NOTE
ADULT SWALLOWING EVALUATION    ASSESSMENT    ASSESSMENT/OVERALL IMPRESSION:  B/S swallow eval completed upon receipt of MD order. Pt admitted to [facility] from [facility] with possible infection and abnormal lab values.  CXR 12/06/20 FINDINGS:  Cardiac size and • Anxiety    • Deep vein thrombosis (HCC)    • Dementia (HCC)    • Depression    • Dysphagia    • Esophageal reflux    • Gait abnormality    • GERD    • High blood pressure    • High cholesterol    • Hyperlipidemia    • HYPERTENSION    • Kidney disease,

## 2020-12-06 NOTE — CONSULTS
Lalo Larry Patient Status:  Inpatient    10/3/1935 MRN [id number]   [facility] 4SW-A Attending Yamile Rees MD   Hosp Day # 1 PCP Esperanza Perez MD     Critical Care Progress Note      Assessment/Plan:  1.  Septic shock - Hx of MRSA been eating regularly as well. She has been admitted for sepsis several times with pna and UTI. Now back with dminished mental state. On floor today with worsening mental state and low BP and hypoxia.   Placed on BiPAP and transferred to ICU for further hours.    Invalid input(s): [id number] MAN  No results for input(s): BNP in the last 168 hours.   Recent Labs   Lab 12/05/20  1225 12/05/20  1547 12/06/20  0633   TROP 1.120* 0.999* 0.946*   CK 57  --   --        Imaging: I independently visualized all rele

## 2020-12-06 NOTE — PROGRESS NOTES
12/06/20 0492   Clinical Encounter Type   Visited With Patient and family together  (Two audra lt sons at bedside)   Routine Visit Introduction   Continue Visiting No   Patient's Supportive Strategies/Resources  shared prayer withe the family   Re

## 2020-12-06 NOTE — PROGRESS NOTES
Assumed care of patient at 0730. Patient alert, responding to sounds and touch. ANASTACIO orientation as pt is nonverbal. Junctional rhythm on tele. Pt on bedrest, cleaned up and dressings changed. Speech consult presented and deemed the patient NPO.  Morning vit

## 2020-12-06 NOTE — PLAN OF CARE
Assumed care of pt at [address] last evening responsive to touch but non verbal and somewhat contracted. Monitor shows sinus les with first degree possibly junctional at times as well with PVC's.   She has multiple mepilex on BLE and sacral is larger existing Progressing     Problem: GENITOURINARY - ADULT  Goal: Absence of urinary retention  Description: INTERVENTIONS:  - Assess patient’s ability to void and empty bladder  - Monitor intake/output and perform bladder scan as needed  - Follow urinary retention pr injury  Description: (Example usage: patient with low platelets)  INTERVENTIONS:  - Avoid intramuscular injections, enemas and rectal medication administration  - Ensure safe mobilization of patient  - Hold pressure on venipuncture sites to achieve adequat

## 2020-12-06 NOTE — PROGRESS NOTES
[id number]: Patient transferred to ICU from general medical floor for acute respiratory failure requiring BIPAP after RRT was called. Patient has a history of dementia, non-verbal and upon assessment does not appear comfortable.  RN unable to obtain accurate O2 s

## 2020-12-06 NOTE — PROGRESS NOTES
[facility] Pharmacy Note:  Renal Dose Adjustment    Lalo Larry has been prescribed famotidine (PEPCID) 40 mg orally every 24 hours. Estimated Creatinine Clearance: 22.6 mL/min (A) (based on SCr of 1.53 mg/dL (H)).     Calculated creatinine clearance is < 50

## 2020-12-06 NOTE — PLAN OF CARE
Patient is alert, but ANASTACIO orientation. Patient is nonverbal, but responds to sound and pain. NSR/SB on tele. Lung sounds diminished with slight crackles on room air. Patient cannot verbalize pain. Stage 4 pressure ulcer present on sacrum, dressing changed.

## 2020-12-06 NOTE — PLAN OF CARE
Received pt this afternoon from the floor. Unable to follow commands and is non-verbal, tracks in room. On Bipap but unable to get a pulse-ox read. Temperature 95.8, michelet hugger placed. Contracted upper extremities L more than R.  Unable to get appropriate

## 2020-12-07 NOTE — PROGRESS NOTES
Patient  20. No respirations, no pulse, no heart or breath sounds auscultated. Son, Gunnar Kenny notified. Son acknowledged need to contact public safety to release body once  home chosen.     SARY Mckeon, Dr Slick Rivera, Dr Janeen Rojas

## 2020-12-07 NOTE — PROGRESS NOTES
12/06/20 3446   Clinical Encounter Type   Visited With Family   Routine Visit Follow-up  (Son asked for information re impending death)   Continue Visiting No   Family Spiritual Encounters   Family Coping Anxiety; Fearful; Sadness   Family Participation i

## 2020-12-09 NOTE — DISCHARGE SUMMARY
[facility]  Discharge Summary  Death summary    Radha Parrish Patient Status:  Inpatient    10/3/1935 MRN [id number]   [facility] 4SW-A Attending No att. providers found   [facility] Day # 2 PCP Yomi James MD     Date of Admission:  ([address]     Sepsis due to undetermined organism ([address]     Umbilical hernia without obstruction and without gangrene     Thyroid nodule     Sacral decubitus ulcer, stage III (HCC)     Anticoagulated     DVT of left axillary vein, acute (HCC)     Normocytic an atelectasis. There is patchy airspace disease in the left lower lobe. There is mild venous congestion. CONCLUSION:  When allowing for projection, no significant change from 12/5/2020.     Dictated by (CST): Jennifer Neal MD on 12/06/2020 at 7: encephalopathy/elevated troponin/pneumonia/UTI/malnourishment due to poor by mouth intake with hypoalbuminemia/cubitus ulceration. Patient is nonverbal at baseline most of the facts are per ER nursing home records.   Please refer to my detailed discussion daily. APPLY TO BOTH BREASTS TOPICALLY AS NEEDED     •  metRONIDAZOLE 0.75 % External Cream, Apply 1 Application topically Q shift.     •  nystatin-triamcinolone 247151-3.1 UNIT/GM-% External Ointment, Apply 1 Application topically 2 (two) times daily.    %.        OBJECTIVE:  Temp:  [96.6 °F (35.9 °C)-97.6 °F (36.4 °C)] 97.6 °F (36.4 °C)  Pulse:  [53-81] 63  Resp:  [18-25] 20  BP: ()/() 136/109     Intake/Output:     Intake/Output Summary (Last 24 hours) at 12/6/2020 1013  Last data filed at 15 Date: 12/6/2020  CONCLUSION:  When allowing for projection, no significant change from 12/5/2020.     Dictated by (CST): Yoshi Rios MD on 12/06/2020 at 7:28 AM     Finalized by (CST): Yoshi Rios MD on 12/06/2020 at 7:30 AM        Xr Chest Ap Portabl projection, no significant change from 12/5/2020.     Dictated by (CST): Saige Oneal MD on 12/06/2020 at 7:28 AM     Finalized by (CST): Saige Oneal MD on 12/06/2020 at 7:30 AM        Xr Chest Ap Portable  (cpt=71045)     Result Date: 12/5/2020  PROCE disturbance (HCC)     Anemia     Thrombocytopenia (HCC)     Azotemia     Altered mental status     Altered mental status, unspecified altered mental status type     Dehydration     Counseling regarding advance care planning and goals of care     Hypoglycem failed palliative/hospice/acute discussions from prior admission–palliative care to follow again for goals of care     Dysphagia with malnourishment due to poor by mouth intake due to end-stage dementia–albumin between 1.3 -1.7 Lately, nutritionist to foll tablet (5 mg total) by mouth 3 (three) times daily.  Hold if systolic blood pressure greater than 120, Normal, Disp-90 tablet, R-0    !! acetaminophen 500 MG Oral Tab  Take 500 mg by mouth every 8 (eight) hours as needed for Pain., Historical    famotidine

## 2020-12-10 ENCOUNTER — APPOINTMENT (OUTPATIENT)
Dept: HEMATOLOGY/ONCOLOGY | Age: 85
End: 2020-12-10
Attending: INTERNAL MEDICINE

## 2022-08-01 NOTE — CDS QUERY
Student's Name:   Suzette Lopez Birth Date  2003  Sex  female  Race/Ethnicity   School/Grade Level/ID#     Address:   [address] [place] [address]  Parent/Guardian             Telephone#                                            Home:                               Work:   IMMUNIZATIONS: To be completed by health care provider. The mo/da/yr for every dose administered is required. If a specific vaccine is medically contraindicated, a separate written statement must be attached by the health care responsible for completing the health examination explaining the medical reason for the contraindication.      Immunization History   Administered Date(s) Administered   • COVID-19 12Y+ Pfizer-BioNtech - Requires Dilution 04/20/2021, 05/11/2021   • DTaP(INFANRIX) 03/18/2006   • DTaP/Hep B/IPV 2004, 01/17/2005, 03/19/2005   • DTaP/IPV 09/16/2009   • HPV 9-Valent 03/31/2017   • HPV Quadrivalent 01/08/2016   • Hep A, Unspecified formulation 01/08/2016, 03/31/2017   • Hep B, adult 2003   • Hib (PRP-OMP) 2003, 01/17/2005, 12/12/2005   • Influenza, injectable, quadrivalent, preservative-free 11/01/2014, 11/06/2015, 10/13/2016, 10/21/2017, 10/20/2018, 10/04/2019, 09/29/2020   • Influenza, live, intranasal, quadrivalent 09/16/2009, 09/27/2010, 11/05/2011   • Influenza, seasonal, injectable, trivalent 11/12/2008, 11/02/2012, 12/14/2013   • MMR 12/12/2005, 09/16/2009   • Meningococcal Conjugate MCV4O (Menveo) 07/08/2021   • Meningococcal Conjugate MCV4P (Menactra) 01/08/2016   • Novel Influenza A0V6-26 11/11/2009   • Novel Influenza B3N6-40, Nasal 12/17/2009   • Pneumococcal Conjugate 7 Valent 2004, 01/17/2005, 03/19/2005, 03/18/2006   • Tdap 01/08/2016   • Typhoid, Vi capsular polysaccharide vaccine 04/02/2021   • Varicella 09/24/2005, 09/16/2009   • Yellow Fever Unspecified Formulation 04/02/2021      Immunizations given 8/1/2022: None      Health care provider (MD, DO, APN, PA, school  Clarification of Procedure – Debridement   CLINICAL DOCUMENTATION CLARIFICATION FORM  Dear Doctor    Clinical information (provided below) indicates a debridement was done.  For accurate ICD-10-PCS code assignment to reflect severity of illness and risk of health professional, health official) verifying above immunization history must sign below. If adding dates to the above immunization history section, put your initials by date(s) and sign here.)  Signature                                                                 Title                                                Date  _____________________________________________________________________________________  Signature                                                                 Title                                                Date 8/1/2022  _____________________________________________________________________________________   ALTERNATIVE PROOF OF IMMUNITY   1. Clinical diagnosis (measles, mumps, hepatitis B) is allowed when verified by physician and supported with lab confirmation.  Attach copy of lab result.    * MEASLES (Rubeola)  MO   DA  YR          **MUMPS  MO   DA  YR             HEPATITIS B  MO   DA   YR           VARICELLA  MO   DA  YR      2. History of varicella (chickenpox) disease is acceptable if verified by health care provider, school health professional or health official.  Person signing below verifies that the parent/guardian’s description of varicella disease history is indicative of past infection and is accepting such history as documentation of disease.  Date of Disease                                  Signature                                                           Title   3. Laboratory Evidence of Immunity (check one)      __ Measles*         __ Mumps**        __ Rubella        __Varicella    (Attach copy of lab result)         *All measles cases diagnosed on or after July 1, 2002, must be confirmed by laboratory evidence.      **All mumps cases diagnosed on or after July 1, 2013, must be confirmed by laboratory evidence.     Completion of Alternative 1 or 3 MUST be accompanied by Labs & Physician Signature: ___________________________  Physician Statements of Immunity MUST be  submitted to ID for review.     Certificates of [employer] Exemption to Immunizations or Physician Medical Statements of Medical Contraindication Are Reviewed   and Maintained by the School Authority     (11/2015)                                         (COMPLETE BOTH SIDES)                                  Approved [facility] 3/2016    HEALTH HISTORY      TO BE COMPLETED AND SIGNED BY PARENT/GUARDIAN AND VERIFIED BY HEALTH CARE PROVIDER  ALLERGIES: (Food, drug, insect, other) is allergic to seasonal and cat dander.   MEDICATION: (List all prescribed or taken on a regular basis.) has a current medication list which includes the following prescription(s): Myorisan 40 MG capsule, atovaquone-proguanil (MALARONE) 250-100 MG per tablet, atovaquone-proguanil (MALARONE) 250-100 MG per tablet, azithromycin (ZITHROMAX) 250 MG tablet, azithromycin (ZITHROMAX) 250 MG tablet, medroxyPROGESTERone (Provera) 10 MG tablet, tretinoin (RETIN-A) 0.1 % cream, azelastine (ASTELIN) 0.1 % nasal spray, fluticasone (FLONASE) 50 MCG/ACT nasal spray, montelukast (SINGULAIR) 10 MG tablet, and olopatadine (PATANOL) 0.1 % ophthalmic solution.   Diagnosis of asthma?  Child wakes during night coughing? Yes    No  Yes    No  Loss of function of one of paired  organs?(eye/ear/kidney/testicle) Yes    No    Birth defects? Yes    No  Hospitalizations? Yes    No    Developmental delay? Yes    No   When? What for? Yes    No    Blood disorders? Hemophilia,  Sickle Cell, Other? Explain. Yes    No  Surgery? (List all.)  When? What for? Yes    No    Diabetes? Yes    No  Serious injury or illness? Yes    No    Head injury/Concussion/Passed out? Yes    No  TB skin test positive (past/present)? * Yes    No *If yes, refer to local health dept   Seizures? What are they like?  Yes    No  TB disease (past or present)? * Yes    No *If yes, refer to local health dept   Heart problem/Shortness of breath?  Yes    No  Tobacco use (type, frequency)?  Yes    No    Heart  murmur/High blood pressure? Yes    No  Alcohol/Drug use?  Yes    No    Dizziness or chest pain with exercise? Yes    No  Family history of sudden death  before age 50? (Cause?) Yes    No    Eye/Vision problems? ______           __Glasses          __Contacts  Last exam by eye doctor ______  Other concerns? (crossed eye, drooping lids, squinting, difficulty reading) Dental?   __ Braces     __ Bridge     __ Plate   __Other   Ear/Hearing problems? Yes    No  Information may be shared with appropriate personnel for health and educational purposes.   Bone/Joint problem/injury/scoliosis? Yes    No  Parent/Guardian  Signature                                               Date   PHYSICAL EXAMINATION REQUIREMENTS   Entire section below to be completed by MD//APN/PA Head Circumference if <2-3 years old - /72   Pulse 72   Temp 98.3 °F (36.8 °C) (Temporal)   Ht 5' 5.16\" (1.655 m)   Wt 68.1 kg (150 lb 3.2 oz)   BMI 24.87 kg/m²   BSA 1.75 m²    82 %ile (Z= 0.90) based on CDC (Girls, 2-20 Years) BMI-for-age based on BMI available as of 8/1/2022.   DIABETES SCREENING (NOT REQUIRED FOR ) BMI>85% age/sex: No     And any two of the following: Family History: No  Ethnic Minority: No    Signs of Insulin Resistance (hypertension, dyslipidemia, polycystic ovarian syndrome, acanthosis nigricans): No  At Risk: No   LEAD RISK QUESTIONNAIRE Required for children age 6 months through 6 years enrolled in licensed or public school operated day care, , nursery school and/or . (Blood test required if resides in [place] or high risk zip code.)  Questionnaire Administered? No  Blood Test Indicated? No   Blood Test Date/Result:    TB SKIN OR BLOOD TEST Recommended only for children in high-risk groups including children immunosuppressed due to HIV infection or other conditions, frequent travel to or born in high prevalence countries or those exposed to adults in high-risk categories. See CDC guidelines.  http://www.cdc.gov/tb/publications/factsheets/testing/TB_testing.htm.  Test Needed: No     Test performed: No                          Skin Test:    Date Read              /      /             Result:   Positive__      Negative__        mm________                          Blood Test: Date Reported       /      /               Result:  Positive__      Negative__      Value________  LAB TESTS (recommended) Date/Result  Date/Results   Hemoglobin or Hematocrit 13.6 (2/1/2016) Sickle Cell (when indicated)    Urinalysis NA Developmental Screening Tool Normal - ASQ        SYSTEM REVIEW Normal  Comments/Follow-up/Needs  Normal Comments/Follow-up/Needs   Skin  Yes  Endocrine Yes    Ears Yes                  Screening Result Gastrointestinal Yes    Eyes Yes                  Screening Result: Genito-Urinary Yes                                      LMP:    Nose Yes  Neurologic Yes    Throat Yes  Musculoskeletal Yes    Mouth/Dental Yes  Spinal Exam Yes    Cardiovascular/HTN Yes  Nutritional status Yes    Respiratory Yes Diagnosis of Asthma: No   Mental Health Yes    Currently Prescribed Asthma Medication:        No  Quick-relief medication (e.g. Short Acting Beta Antagonist)        No  Controller medication (e.g. inhaled corticosteroid) Other     NEEDS/MODIFICATIONS required in the school setting: No restrictions DIETARY Needs/Restrictions: No restrictions   SPECIAL INSTRUCTIONS/DEVICES e.g. safety glasses, glass eye, chest protector for arrhythmia, pacemaker, prosthetic device, dental bridge, false teeth, athletic support/cup: No restrictions   MENTAL HEALTH/OTHER Is there anything else the school should know about this student?  If you would like to discuss this student’s health with school or school health personnel:  Not needed   EMERGENCY ACTION needed while at school due to child’s health condition (e.g. ,seizures, asthma, insect sting, food, peanut allergy, bleeding problem, diabetes, heart problem)?   No   On the basis  of the examination on this day, I approve this child’s participation in                                (If No or Modified please attach explanation.)  PHYSICAL EDUCATION:  Yes                               INTERSCHOLASTIC SPORTS   Yes   Print Name  MD Dylon Cunningham MD                              Date: 8/1/2022   Address:  [address] [address]  [phone]  [phone]         (Complete Both Sides)     Approved [facility] 3/2016

## 2022-12-08 NOTE — CONSULTS
INFECTIOUS DISEASE CONSULT NOTE    Sophia Colby Patient Status:  Inpatient    10/3/1935 MRN [id number]   [facility] 4SW-A Attending Emmy Gongora MD   Hosp Day # 0 PCP Angely Madsen complete    Physical Exam:    General: No acute distress. Awake but not answering questions  Vital signs: Blood pressure 96/60, pulse 88, temperature 97.3 °F (36.3 °C), temperature source Temporal, resp.  rate 25, height 5' 3\" (1.6 m), weight 157 lb 6.5 oz techniques were used. Dose information is transmitted to the ACR [facility] of Radiology) NRDR ([address]) which includes the Dose Index Registry.   PATIENT STATED HISTORY: (As transcribed by Technologist)  Patient had multiple offered no additional history at this time    FINDINGS:  The heart size is upper limits of normal.  There no venous congestion. Again noted is mild pulmonary fibrosis. There is decrease discoid atelectasis in the lingular segment of the left upper lobe. compromise either  - await Bcx and UCx  - strep and legionella Uag  - follow cr and adjust abx doses if improves  - follow wbc to doc resolution  - off load  D/w staff  Thank you for allowing me to participate in the care of this patient.  Please do not hes 99.5

## 2023-06-14 NOTE — PLAN OF CARE
Problem: PAIN - ADULT  Goal: Verbalizes/displays adequate comfort level or patient's stated pain goal  Description  INTERVENTIONS:  - Encourage pt to monitor pain and request assistance  - Assess pain using appropriate pain scale  - Administer analgesics appropriate  - Identify discharge learning needs (meds, wound care, etc)  - Arrange for interpreters to assist at discharge as needed  - Consider post-discharge preferences of patient/family/discharge partner  - Complete POLST form as appropriate  - Assess monitoring, monitor vital signs, obtain 12 lead EKG if indicated  - Evaluate effectiveness of antiarrhythmic and heart rate control medications as ordered  - Initiate emergency measures for life threatening arrhythmias  - Monitor electrolytes and administe no participate in ADLs to maximize function  - Promote sitting position while performing ADLs such as feeding, grooming, and bathing  - Educate and encourage patient/family in tolerated functional activity level and precautions during self-care     Outcome: P

## 2023-08-05 NOTE — CM/SW NOTE
Case discussed in rounds. Possible d/c. SW sent updates to [facility]. Will await MD orders.       [id number] [facility] of [address]          [place], [address] [facility] 5

## (undated) DEVICE — MINI LAP PACK-LF: Brand: MEDLINE INDUSTRIES, INC.

## (undated) DEVICE — UNDYED BRAIDED (POLYGLACTIN 910), SYNTHETIC ABSORBABLE SUTURE: Brand: COATED VICRYL

## (undated) DEVICE — ABDOMINAL PAD: Brand: DERMACEA

## (undated) DEVICE — REM POLYHESIVE ADULT PATIENT RETURN ELECTRODE: Brand: VALLEYLAB

## (undated) DEVICE — SOL  .9 1000ML BTL

## (undated) DEVICE — SPNG DRESS 8X4IN NLTX STRL 12

## (undated) DEVICE — STERILE SYNTHETIC POLYISOPRENE POWDER-FREE SURGICAL GLOVES WITH HYDROGEL COATING: Brand: PROTEXIS

## (undated) DEVICE — KENDALL SCD EXPRESS SLEEVES, KNEE LENGTH, MEDIUM: Brand: KENDALL SCD

## (undated) DEVICE — SPONGE STICK WITH PVP-I: Brand: KENDALL

## (undated) NOTE — IP AVS SNAPSHOT
Patient Demographics     Address  [place]  [address] Phone  [phone] [facility]  [phone] [facility] E-mail Address  [email or web address]      Emergency Contact(s)     Name Relation Home Work [address] [place] [address],[address] Commonly known as:  NAMENDA  Next dose due:  8/6 PM      Take 1 tablet (5 mg total) by mouth 2 (two) times daily.    Elías Campuzano MD         Methenamine Hippurate 1 g Tabs  Commonly known as:  HIPREX  Next dose due:  8/6 PM      Take 1 g by mouth 2 (two) times [id number] folic acid (FOLVITE) tab 1 mg 08/06/19 0853 Given            LEFT UPPER ABDOMEN     Order ID Medication Name Action Time Action Reason Comments    [id number] Heparin Sodium (Porcine) 5000 UNIT/ML injection 5,000 Units 08/06/19 0852 Given Author:  Wanda Juares MD Service:  Internal Medicine Author Type:  Physician    Filed:  8/4/2019 12:08 PM Status:  Signed    :  Wanda Juares MD (Physician)       [address]    PATIENT'S NAME: GOPAL Will   ATTENDING PHYSICIAN: Racquel Soriano ALLERGIES:  Penicillin, sulfa, trimethoprim, celecoxib. FAMILY HISTORY:  Noncontributory. SOCIAL HISTORY:  No smoking, alcohol, drug abuse. REVIEW OF SYSTEMS:  As above, otherwise negative.       PHYSICAL EXAMINATION:    GENERAL:  Fair, awake, gilberto SK.1 Nancy Fontana MD on 8/3/2019 11:25 AM                        Consults - MD Consult Notes      Consults signed by Jeniffer Gannon MD at 8/5/2019 11:29 AM     Author:  Jeniffer Gannon MD Service:  Palliative Care Author Type:  Physician Living Situation Prior to Hospitalization: [facility]  Does Patient Live Alone: no  Is Patient Confused: yes - Veryl Kelley tried three different time to reframe the discussion about CPR with Jacob Carranza however she would ask about her meals, meat and other items inste •  ondansetron HCl (ZOFRAN) injection 4 mg, 4 mg, Intravenous, Q4H PRN  •  0.9% NaCl infusion, , Intravenous, Continuous  •  Heparin Sodium (Porcine) 5000 UNIT/ML injection 5,000 Units, 5,000 Units, Subcutaneous, 2 times per day    No current outpatient me examiner, unable to answer complex questions about CPR. Furrows eyebrows when son talks and states certain words like 'machine' 'chest compressions'.   Psychiatric: Mood pleasant and cooperative however does not appear to understand complex questions  Skin: Gunnar Kenny told me that he tried to set up palliative care services weeks ago however events happened that stopped it from progressing. Gunnar Kenny asked his mother several times in different ways about her wishes re: CPR and EOL.  She did not appear to be abl DNR CODE STATUS today after GOCD.  - POLST: reflects wishes of DNR CODE STATUS, Selective treatment in Section B and no feeding tube in Section C. Will scan into EMR. - Healthcare POA / HC surrogate: Tima Holguin her son    3. Symptoms management.  No active sx DIABETES MELLITUS TYPE II     Glenohumeral arthritis, left     Glenohumeral arthritis, right     Poor short term memory     Primary insomnia     Apathy     Anxiety     Tremor     Acute renal failure (HCC)     Age-related nuclear cataract of both eyes Impression:  Normal awake and drowsy EEG.  If there remains a concern for epilepsy, then sleep deprived or prolonged EEG may provide more information.         Mri brain-  CONCLUSION:  Nondiagnostic limited examination.  Patient was unable to tolerate the ex       Meds:      • Methenamine Hippurate  1 g Oral BID   • atorvastatin  10 mg Oral Nightly   • amLODIPine Besylate  2.5 mg Oral Daily   • famoTIDine  20 mg Oral Daily   • acetaminophen  500 mg Oral Nightly   • Memantine HCl  5 mg Oral BID   • Sertraline H mild dehydration secondary to poor p.o. intake secondary to dementia.  better     14     Dysphagia- PER ST      15     Advanced dementia with ftt- palliative consult               See tests ordered,  Available and radiology reviewed  All consultant notes re Follow up Visits: Follow-up with all MD's as per their recommendation/ refer to chart for details. 3 days    Derrick Esters  8/6/2019  11:57 AM[SK.1]    Electronically signed by Alize Kidd MD on 8/6/2019 11:57 AM   Attribution Key    SK. 1 - Rony Escobar Counseling regarding advance care planning and goals of care      Reason for Admission: see below    Physical Exam: see below    Hospital Course:     SUBJECTIVE:  Subjective:  Ronald Hernandez is a(n) 81year old female.   Awake, alert x 1, cooperative  Con Neck:   Supple, symmetrical, trachea midline,        thyroid:      no JVD   Lungs:     Clear to auscultation bilaterally, respirations unlabored         Heart:    Regular rate and rhythm, S1 and S2 normal,     Abdomen:     Soft, non-tender, bowel sounds ac Active Problems:    Anemia    Thrombocytopenia (HCC)    Azotemia    Altered mental status    Dehydration    Counseling regarding advance care planning and goals of care              Plan:  Continue present management,  1.       Altered mental status, possi Calcium Citrate-Vitamin D (CITRACAL PETITES/VITAMIN D) 200-250 MG-UNIT Oral Tab  Take 1 tablet by mouth daily. Gauze Pads & Dressings (OPTIFOAM) 6\"X6\" Does not apply Pads  by Does not apply route.     acetaminophen (TYLENOL EXTRA STRENGTH) 500 MG Oral Received order for PT evaluation. MRA Carotid testing pending. Bathroom privilege only until the test is complete. Will wait for activity level upgrade. Will continue to follow. Spoke with RN.[CM.1]      Attribution Key    CM. 1 - Padmini Delvalle, PT on Received OT orders for eval and txt. Chart reviewed and therapy spoke with RN. Pt with MRI pending and bedrest order with bathroom privileges only. . Will await MRI and upgrade in activity orders. Will check back as schedule allows. RN in agreement. [MR.1 Interdisciplinary Communication: Discussed with RN        GOALS  Goal #1 The patient will tolerate pureed consistency and thin liquids without overt signs or symptoms of aspiration with 100 % accuracy over 1 session(s).   Met   Goal #2 The patient/family/ca

## (undated) NOTE — IP AVS SNAPSHOT
[address]            (For Outpatient Use Only) Initial Admit Date: 7/21/2020   Inpt/Obs Admit Date: Inpt: 7/21/20 / Obs: N/A   Discharge Date:    Kelly Nicholas:  [de-identified]   MRN: [de-identified]   CSN: [id number]   CEID: [id number] Group Number:  Insurance Type:    Subscriber Name:  Subscriber :    Subscriber ID:  Pt Rel to Subscriber:    Hospital Account Financial Class: Medicare    2020

## (undated) NOTE — LETTER
[facility]     I agree to have a Peripherally Inserted Central Catheter (PICC) placed in my arm.    1. The PICC insertion procedure, care, maintenance, risks, benefits, and complications have been explained to me by my physic benefits, and side effects related to the alternatives and risks related to not receiving this procedure. 8.  I have expressed any questions about this procedure to my physician or the PICC Proceduralist and he/she has answered them.   I certify that I h

## (undated) NOTE — IP AVS SNAPSHOT
Patient Demographics     Address  [address] [facility] [place] [facility] [place] [place] [address] [address] Phone  [phone] [facility]  [phone] [facility] E-mail Address  [email or web address]      Emergency Contact(s)     Name Relation Home Work [address] [place] [address] At them that you have or may have COVID-19. This will help the healthcare provider's office take steps to keep other people from getting infected or exposed.   Wear a facemask  You should wear a facemask when you are around other people (e.g., sharing a room o clean any surfaces that may have blood, stool, or body fluids on them. Use a household cleaning spray or wipe, according to the label instructions.  Labels contain instructions for safe and effective use of the cleaning product including precautions you tristen 2. Monitor your symptoms carefully. If your symptoms get worse, call your healthcare provider immediately. 3. Get rest and stay hydrated.   4. If you have a medical appointment, call the healthcare provider ahead of time and tell them that you have or may Pardeep Hatfield [address]  [phone]             Galen Cadena MD. Schedule an appointment as soon as possible for a visit in 1 week.     Specialty:  NEPHROLOGY  Why:  tele health apt nephrologist  Contact information:  Nilda Duvall [phone] [facility] Apply topically nightly. To right thigh for skin condition. Cleanse area w/ns, apply. optifoam gentle          raNITIdine HCl 150 MG Tabs  Commonly known as:  ZANTAC  Next dose due:  4/26/2020      Take 150 mg by mouth nightly.           Robafen 100 MG/5 [id number] Memantine HCl (NAMENDA) tab 5 mg 04/25/20 0926 Given      [id number] Normal Saline Flush 0.9 % injection 10 mL 04/24/20 2130 Given      [id number] Normal Saline Flush 0.9 % injection 10 mL 04/25/20 0928 Given      [id number] Sertraline HCl (ZOLOFT) Procedure Component Value Units Date/Time    Blood Culture Once [[id number]] Collected:  04/19/20 1419    Order Status:  Completed Lab Status:  Final result Updated:  04/24/20 1500    Specimen:  Blood,peripheral      Blood Culture Result No Growth 5 Days Blood Culture Result No Growth 5 Days    Urine Culture, Routine Once [[id number]] Collected:  04/14/20 2005    Order Status:  Completed Lab Status:  Final result Updated:  04/16/20 1033    Specimen:  Urine, clean catch      Urine Culture No Growth at 18-2 persistent infiltrate. She was given another round of z-pack which she just completed. She has been more lethargic and weak recently. She had blood work done which showed SHWETHA. In the ED she is noted to have watery diarrhea.      Past Medical History:  P 2 (two) times daily. , Disp: 60 tablet, Rfl: 3  Sertraline HCl 50 MG Oral Tab, Take 1 tablet (50 mg total) by mouth daily. at bedtime, Disp: 30 tablet, Rfl: 5  folic acid 1 MG Oral Tab, Take 1 tablet (1 mg total) by mouth daily. , Disp: 30 tablet, Rfl: 11  a TP 8.6*       Estimated Creatinine Clearance: 5.9 mL/min (A) (based on SCr of 6.19 mg/dL (H)). No results for input(s): PTP, INR in the last 168 hours. Recent Labs   Lab 04/14/20  2120   TROP 0.118*       Imaging: Imaging data reviewed in Epic. Hosp Day # 0 PCP Davina Selby MD     Chief Complaint: weaknesss    History of Present Illness: Kateryna Mehta is a 81year old female with a past medical history of dementia, GERD, HTN, DL, CKD.   About three weeks ago she developed a cough and CXR was don acetaminophen (TYLENOL EXTRA STRENGTH) 500 MG Oral Tab, Take 500 mg by mouth every 8 (eight) hours as needed for Pain., Disp: , Rfl:   Multiple Vitamins-Minerals (MULTIVITAMIN WOMEN 50+ OR), Take 1 tablet by mouth daily.   , Disp: , Rfl:   Acetaminophen 500 Integument: No rashes or lesions. Psychiatric: Appropriate mood and affect.       Diagnostic Data:      Labs:  Recent Labs   Lab 04/14/20 2017   WBC 12.1*   HGB 14.9   MCV 95.8   .0*       Recent Labs   Lab 04/14/20 2120   GLU 69*   BUN 98*   CRE Kendal Hernandez Patient Status:  Inpatient    10/3/1935 MRN [id number]   [facility] 5NW-A Attending Kyra Hernandez MD   Hosp Day # 2 PCP Hannah Lomeli MD     Reason for Consultation:  Bradycardia    History of Present Illness:  eKndal Hernandez Glucose-Vitamin C (DEX-4) chewable tab 8 tablet, 8 tablet, Oral, Q15 Min PRN  •  potassium chloride 40 mEq in sodium chloride 0.9% 250 mL IVPB, 40 mEq, Intravenous, Once  •  aspirin chewable tab 81 mg, 81 mg, Oral, Daily  •  Memantine HCl (NAMENDA) tab 5 m CA 7.1 04/17/2020    ALB 2.1 04/17/2020    ALKPHO 60 04/17/2020    BILT 0.3 04/17/2020    TP 5.1 04/17/2020    AST 14 04/17/2020    ALT 10 04/17/2020    PGLU 102 04/17/2020     Assessment/Plan:      Patient Active Problem List:     DIABETES MELLITUS TYPE motion abnormality. Increase activity and see if heart rate increases. Will follow. Patrica Puls  4/17/2020  1:01 PM[AB.1]    Electronically signed by Zurdo Shipley MD on 4/17/2020  1:14 PM   Attribution Bone    AB. 1 - Zurdo Shipley MD on 4/17/2020  1:01 • [facility] ([facility]    • Depression    • Dysphagia    • Esophageal reflux    • Gait abnormality    • GERD    • High blood pressure    • High cholesterol    • Hyperlipidemia    • HYPERTENSION    • Kidney disease, chronic, stage III (GFR 30-59 ml/min) ([facility]) Skilled Therapy Provided: (PPE worn: gown, goggles, gloves, surgical mask) Pt masked for most of session. Pt received supine in bed and is following commands ~ 75% of the time with delayed processing speed. Pt participated in LE HEP in supine.  Pt supine-si of rehabilitation patient should achieve MIN level in transfers and short distance ambulation.     Plan to follow peripherally, monitoring documentation and speaking with nursing regarding patients functional performance and will recommend any adjustments a Occupational Therapy Notes (last 72 hours) (Notes from 4/22/2020 10:49 AM through 4/25/2020 10:49 AM)      Occupational Therapy Note signed by Shawn Hubbard OT at 4/24/2020  2:41 PM  Version 1 of 1    Author:  Shawn Hubbard OT Service:  Rehab Author Ty • UTI (urinary tract infection)        Past Surgical History  Past Surgical History:   Procedure Laterality Date   • APPENDECTOMY     • CHOLECYSTECTOMY         SUBJECTIVE  \"I was scared to sleep because I was afraid I wouldn't wake up. \"    Patient self-s 1/4 active B shoulder flexion. Washed face w/ set-up and fair quality. Supine to EOB w/ max A and HOB to 30deg  EOB sitting with min A for balance  EOB to stand with max A of 2 people.   Once standing, patient was able to maintain static standing x 30 s Additional Goals:  Pt will follow simple one step commands with 50% accuracy. -MET 4/24/20[AO.1]     Attribution Bone    AO. 1 - Timi Araiza OT on 4/24/2020  2:17 PM               Occupational Therapy Note signed by Elke Blood at 4/23/2020  7:21 AM Aspiration Precautions: Upright position; Slow rate;Small bites and sips; No straw;Cueing to swallow  Medication Administration Recommendations: One pill at a time;Crushed in puree  Treatment Plan/Recommendations: Dysphagia therapy    HISTORY   Background/Ob Method of Presentation: Cup;Teaspoon  Oral Phase of Swallow (VFSS - Thin Liquids): Impaired  Bolus Retrieval (VFSS - Thin Liquids): Intact  Bilabial Seal (VFSS - Thin Liquids): Impaired  Bolus Formation (VFSS - Thin Liquids):  Intact  Bolus Propulsion (VFSS Penetration Aspiration Scale Score: Score 1: Material does not enter airway       Overall Impression: Pt presented with mild oral phase dysphagia characterized by decreased labial seal, decreased bolus manipulation/mastication, decreased a-p transit, decre Reviewed results with Radiologist; agreement verbalized.     Patient Experiencing Pain: No    FOLLOW UP  Treatment Plan/Recommendations: Dysphagia therapy  Number of Visits to Meet Established Goals: 1      Thank you for your referral.   If you have any que Elevated troponin    Suspected COVID-19 virus infection    CKD (chronic kidney disease) stage 3, GFR 30-59 ml/min (HCC)    Septic shock (HCC)    Septicemia (HCC)    Palliative care encounter    Hypokalemia    MRSA bacteremia    Goals of care, counseling/ Method of Presentation: Teaspoon  Oral Phase of Swallow (VFSS - Nectar Thick Liquids): Impaired  Bolus Retrieval (VFSS - Nectar Thick Liquids): Intact  Bilabial Seal (VFSS - Nectar Thick Liquids): Impaired  Bolus Formation (VFSS - Nectar Thick Liquids):  In Recommend pureed diet and thin thick liquids with utilization of compensatory strategies and swallow precautions. Anticipate variability in swallow function dependent on pt's alertness, time of day, and medical status. Only attempt feeding when pt is alert Filed:  4/23/2020  2:45 PM Date of Service:  4/23/2020  2:12 PM Status:  Signed    :  Trevin Muir SLP (SPEECH-LANGUAGE PATHOLOGIST)       SPEECH DAILY NOTE - INPATIENT    ASSESSMENT & PLAN   ASSESSMENT  Pt seen this AM for meal follow up and educat Discharge Recommendations/Plan: Undetermined[LO.2]    Treatment Plan[LO.1]  Treatment Plan/Recommendations: No further inpatient SLP service warranted;Aspiration precautions[LO.2]    Interdisciplinary Communication: Discussed with RN    GOALS  Goal #1 The

## (undated) NOTE — LETTER
Rema Lamb 182  [address] [facility] [address], [address] [facility]  Authorization for Surgical Operation and Procedure     Date:_07/26/2020__________                                                                                                         Time:_1 potential risks that can occur: fever and allergic reactions, hemolytic reactions, transmission of diseases such as Hepatitis, AIDS and Cytomegalovirus (CMV) and fluid overload.   In the event that I wish to have an autologous transfusion of my own blood, o attending physician will determine when the applicable recovery period ends for purposes of reinstating the DNAR order.   10. Patients having a sterilization procedure: I understand that if the procedure is successful the results will be permanent and it wi a. Allow the anesthesiologist (anesthesia doctor) to give me medicine and do additional procedures as necessary.  Some examples are: Starting or using an “IV” to give me medicine, fluids or blood during my procedure, and having a breathing tube placed to he 7. Regional Anesthesia (“spinal”, “epidural”, & “nerve blocks”): I understand that rare but potential complications include headache, bleeding, infection, seizure, irregular heart rhythms, and nerve injury.     I can change my mind about having anesthesia

## (undated) NOTE — LETTER
Rema Lamb 182  [address] [facility] [address], [address] [facility]  Authorization for Surgical Operation and Procedure     Date:___________                                                                                                         Time:__________ potential risks that can occur: fever and allergic reactions, hemolytic reactions, transmission of diseases such as Hepatitis, AIDS and Cytomegalovirus (CMV) and fluid overload.   In the event that I wish to have an autologous transfusion of my own blood, o attending physician will determine when the applicable recovery period ends for purposes of reinstating the DNAR order.   10. Patients having a sterilization procedure: I understand that if the procedure is successful the results will be permanent and it wi a. Allow the anesthesiologist (anesthesia doctor) to give me medicine and do additional procedures as necessary.  Some examples are: Starting or using an “IV” to give me medicine, fluids or blood during my procedure, and having a breathing tube placed to he 7. Regional Anesthesia (“spinal”, “epidural”, & “nerve blocks”): I understand that rare but potential complications include headache, bleeding, infection, seizure, irregular heart rhythms, and nerve injury.     I can change my mind about having anesthesia

## (undated) NOTE — LETTER
Rema Lamb 182  [address] [facility] [address], [address] [facility]  Authorization for Surgical Operation and Procedure     Date:___________                                                                                                         Time:__________ potential risks that can occur: fever and allergic reactions, hemolytic reactions, transmission of diseases such as Hepatitis, AIDS and Cytomegalovirus (CMV) and fluid overload.   In the event that I wish to have an autologous transfusion of my own blood, o attending physician will determine when the applicable recovery period ends for purposes of reinstating the DNAR order.   10. Patients having a sterilization procedure: I understand that if the procedure is successful the results will be permanent and it wi a. Allow the anesthesiologist (anesthesia doctor) to give me medicine and do additional procedures as necessary.  Some examples are: Starting or using an “IV” to give me medicine, fluids or blood during my procedure, and having a breathing tube placed to he 7. Regional Anesthesia (“spinal”, “epidural”, & “nerve blocks”): I understand that rare but potential complications include headache, bleeding, infection, seizure, irregular heart rhythms, and nerve injury.     I can change my mind about having anesthesia

## (undated) NOTE — LETTER
[address] [facility]     I agree to have a Peripherally Inserted Central Catheter (PICC) placed in my arm.    1. The PICC insertion procedure, care, maintenance, risks, benefits, and complications have been explained to me by my physic also discussed reasonable alternatives to the PICC, including risks, benefits, and side effects related to the alternatives and risks related to not receiving this procedure.     8.  I have expressed any questions about this procedure to my physician or the

## (undated) NOTE — IP AVS SNAPSHOT
Patient Demographics     Address  [place]  [address] Phone  [phone] [facility]  [phone] [facility] E-mail Address  [email or web address]      Emergency Contact(s)     Name Relation Home Work [address] [place] [address] Apply 1 Application topically daily as needed (dry skin condition). Memantine HCl 5 MG Tabs  Commonly known as:  Namenda      Take 1 tablet (5 mg total) by mouth 2 (two) times daily.    Cayetano Salvador MD         Miconazole Antifungal 2 % Crea  Generic Take 30 mL by mouth daily.                 Where to Get Your Medications      These medications were sent to [facility], [facility], [address] [facility], [address] [place] [address] Street    Phone:  [phone] Ordering provider:  Mani Rogers MD  08/13/20 2300 Resulting lab:  [address] [facility])    Specimen Information    Type Source Collected On   Blood — 08/14/20 1125          Components    Component Value Reference Range Flag Lab Result status: Final result   Ordering provider:  Swathi Chaudhari MD  08/13/20 2300 Resulting lab:  [facility])   Narrative: The following orders were created for panel order CBC WITH DIFFERENTIAL WITH PLATELET.   Procedure Specimen Information    Type Source Collected On   Blood — 08/13/20 0644          Components    Component Value Reference Range Flag Lab   Iron 39 50 - 170 ug/dL  [facility])   Transferrin 123 200 - 360 mg/dL  [facility])   Total Iron Binding Ca Rapid SARS-CoV-2 by PCR STAT [[id number]]  (Normal) Collected:  08/14/20 113    Order Status:  Completed Lab Status:  Final result Updated:  08/14/20 1201    Specimen:  Other from [place]      Rapid SARS-CoV-2 by PCR Not Detected    Tissue Aerobic Culture [ Not Specified    Cefazolin  Resistant    Gentamicin <=0.5  Sensitive    Levofloxacin 4  Resistant    Nitrofurantoin <=16  Sensitive    Oxacillin  Resistant    Tetracycline <=1  Sensitive    Trimethoprim/Sulfa <=10  Sensitive    Vancomycin 1  Sensitive Author:  Theron El MD Service:  Internal Medicine Author Type:  Physician    Filed:  7/22/2020  1:30 PM Date of Service:  7/22/2020  1:26 PM Status:  Signed    :  Theron El MD (Physician)       [address] [place] [address] Family History   Problem Relation Age of Onset   • Cancer Neg      Social History:  Social History    Tobacco Use      Smoking status: Never Smoker      Smokeless tobacco: Never Used    Alcohol use: Not Currently      Comment: rarely    Drug use: Never Miconazole Nitrate (MICONAZOLE ANTIFUNGAL) 2 % External Cream, Apply topically as needed. guaiFENesin (ROBAFEN) 100 MG/5ML Oral Syrup, Take 200 mg by mouth every 4 (four) hours as needed (Allergy symptoms).     acetaminophen (TYLENOL EXTRA STRENGTH) 500 MG CO2 21.0 07/22/2020     (H) 07/22/2020    CA 7.8 (L) 07/22/2020    ALB 1.2 (L) 07/22/2020    ALKPHO 156 (H) 07/22/2020    BILT 0.9 07/22/2020    TP 5.6 (L) 07/22/2020    AST 23 07/22/2020    ALT 35 07/22/2020    PTT 38.0 (H) 07/21/2020    INR 2.56 Noncontrast CT scanning is performed through the brain. Dose reduction techniques were used. Dose information is transmitted to the [facility] [employer] of Rad[facility]) NRDR ([address]) which includes the Dose Index Registry.   PATIENT ([facility] of Radiology) NRDR ([address]) which includes the Dose Index Registry. PATIENT STATED HISTORY: (As transcribed by Technologist)  The patient fell and hit right side of head/face.  The patient has abrusion and swelli STATED HISTORY: (As transcribed by Technologist)  The patient fell and hit right side of head/face. The patient has abrusion and swelling right on side of face. FINDINGS:  Marked motion degradation. Repeat imaging was performed.   This repeat imaging de fibrosis. There is decrease discoid atelectasis in the lingular segment of the left upper lobe. There is decrease in the interstitial changes in the left lung base are previous. Slight decrease in the small left effusion.   Minimal fibrotic changes are n Late onset Alzheimer's disease with behavioral disturbance (HCC)     Anemia     Thrombocytopenia (HCC)     Azotemia     Altered mental status     Altered mental status, unspecified altered mental status type     Dehydration     Counseling regarding adva SK.1 Nabor Perea MD on 7/22/2020  1:26 PM                        Consults - MD Consult Notes      Consults signed by Sonny Olguin MD at 8/14/2020  7:10 AM     Author:  Sonny Olguin MD Service:  Hematology/Oncology Author Type:  Physician    Jac Guillen Performed by Carina Fenton MD at [facility] MAIN OR[JK.2]       Family History:[JK.1]  Family History   Problem Relation Age of Onset   • Cancer Neg[JK.2]        Social History:  she[JK.1] reports that she has never smoked.  She has never used smokeless tobacco •  Metoclopramide HCl (REGLAN) injection 5 mg, 5 mg, Intravenous, Q8H PRN[JK.2]    Review of Systems:  The pertinent positives and negatives were described. All other systems were negative.     Physical Exam:   Vital Signs:[JK.1] BP (!) 89/63 (BP Location: vein.  Superficial thrombus partially within the proximal basilic vein on the left. COMPRESSION:  Normal compressibility, phasicity, and augmentation of the subclavian, jugular, axillary, brachial, basilic, and cephalic veins on the right.   OTHER:  Taylor Apgar Thyroid nodule     Sacral decubitus ulcer, stage III (HCC)     Anticoagulated[JK.2]      1. Left axillary vein DVT diagnosed on 7/26/2020:    She has been on apixaban but had oozing at PICC site.  It is reasonable to hold a dose to try to obtain hemostas MS.1 - Ayesha Gordon OT on 8/13/2020  2:06 PM                     Video Swallow Study Notes    No notes of this type exist for this encounter.         SLP Note - SLP Notes      SLP Note signed by VIN Sifuentes at 7/27/2020 11:05 AM  Version 1 of 1 warranted;Aspiration precautions    Interdisciplinary Communication: Discussed with RN               GOALS  Goal #1 The patient will tolerate puree consistency and nectar thick liquids without overt signs or symptoms of aspiration with 95 % accuracy over 2 8/3 noc: maintain safety  Precautions   8/5 am: comfort  8/5 noc: to sleep  8/6 am: improve appetite  8/7/20 DAYS\" HAVE WOUND HEAL\"  8/8 NOC: rest and remain comfortable  8/9 AM: remain comfortable  8/9 NOC: stay comfortable  8/10 AM: remain comfortable

## (undated) NOTE — LETTER
[facility] [address], [address]  Consent for Procedure/Sedation    Date: 7/27/2020    Time: 1420      1. I authorize the performance upon Tresa Yanni the following:  Peripherally Inserted Central Catheter Insertion     2.  I au Printed: 2020   2:19 PM  Patient Name: Lalo Larry        : 10/3/1935       Medical Record #: [id number]

## (undated) NOTE — IP AVS SNAPSHOT
[address]            (For Outpatient Use Only) Initial Admit Date: 4/14/2020   Inpt/Obs Admit Date: Inpt: 4/15/20 / Obs: N/A   Discharge Date:    Graeme Early:  [de-identified]   MRN: [de-identified]   CSN: [id number]   CEID: [id number] Group Number:  Insurance Type:    Subscriber Name:  Subscriber :    Subscriber ID:  Pt Rel to Subscriber:    Hospital Account Financial Class: Medicare    2020

## (undated) NOTE — LETTER
[address] [facility]     I agree to have a Peripherally Inserted Central Catheter (PICC) placed in my arm.    1. The PICC insertion procedure, care, maintenance, risks, benefits, and complications have been explained to me by my physic also discussed reasonable alternatives to the PICC, including risks, benefits, and side effects related to the alternatives and risks related to not receiving this procedure.     8.  I have expressed any questions about this procedure to my physician or the